# Patient Record
Sex: FEMALE | Race: WHITE | Employment: OTHER | URBAN - METROPOLITAN AREA
[De-identification: names, ages, dates, MRNs, and addresses within clinical notes are randomized per-mention and may not be internally consistent; named-entity substitution may affect disease eponyms.]

---

## 2018-02-05 ENCOUNTER — HOSPITAL ENCOUNTER (OUTPATIENT)
Dept: MAMMOGRAPHY | Age: 68
Discharge: HOME | End: 2018-02-05
Attending: INTERNAL MEDICINE
Payer: MEDICARE

## 2018-02-05 DIAGNOSIS — Z12.31 VISIT FOR SCREENING MAMMOGRAM: ICD-10-CM

## 2018-02-05 PROCEDURE — 77063 BREAST TOMOSYNTHESIS BI: CPT

## 2019-04-25 ENCOUNTER — HOSPITAL ENCOUNTER (OUTPATIENT)
Dept: MAMMOGRAPHY | Age: 69
Discharge: HOME | End: 2019-04-25
Attending: INTERNAL MEDICINE
Payer: MEDICARE

## 2019-04-25 DIAGNOSIS — Z12.31 VISIT FOR SCREENING MAMMOGRAM: ICD-10-CM

## 2019-04-25 PROCEDURE — 77063 BREAST TOMOSYNTHESIS BI: CPT

## 2019-07-11 ENCOUNTER — HOSPITAL ENCOUNTER (OUTPATIENT)
Dept: GENERAL RADIOLOGY | Age: 69
Discharge: HOME | End: 2019-07-11
Payer: MEDICARE

## 2019-07-11 DIAGNOSIS — M54.2 NECK PAIN: ICD-10-CM

## 2019-07-11 PROCEDURE — 72070 X-RAY EXAM THORAC SPINE 2VWS: CPT

## 2019-07-11 PROCEDURE — 72040 X-RAY EXAM NECK SPINE 2-3 VW: CPT

## 2020-09-17 ENCOUNTER — HOSPITAL ENCOUNTER (OUTPATIENT)
Dept: MAMMOGRAPHY | Age: 70
Discharge: HOME | End: 2020-09-17
Attending: INTERNAL MEDICINE
Payer: MEDICARE

## 2020-09-17 DIAGNOSIS — Z12.31 VISIT FOR SCREENING MAMMOGRAM: ICD-10-CM

## 2020-09-17 PROCEDURE — 77067 SCR MAMMO BI INCL CAD: CPT

## 2021-10-11 ENCOUNTER — TRANSCRIBE ORDER (OUTPATIENT)
Dept: SCHEDULING | Age: 71
End: 2021-10-11

## 2021-10-11 DIAGNOSIS — Z12.31 VISIT FOR SCREENING MAMMOGRAM: Primary | ICD-10-CM

## 2021-12-21 ENCOUNTER — HOSPITAL ENCOUNTER (OUTPATIENT)
Dept: MAMMOGRAPHY | Age: 71
Discharge: HOME | End: 2021-12-21
Attending: INTERNAL MEDICINE
Payer: MEDICARE

## 2021-12-21 DIAGNOSIS — Z12.31 VISIT FOR SCREENING MAMMOGRAM: ICD-10-CM

## 2021-12-21 PROCEDURE — 77063 BREAST TOMOSYNTHESIS BI: CPT

## 2022-12-05 ENCOUNTER — TRANSCRIBE ORDER (OUTPATIENT)
Dept: SCHEDULING | Age: 72
End: 2022-12-05

## 2022-12-05 DIAGNOSIS — Z12.31 VISIT FOR SCREENING MAMMOGRAM: Primary | ICD-10-CM

## 2023-01-03 ENCOUNTER — HOSPITAL ENCOUNTER (OUTPATIENT)
Dept: MAMMOGRAPHY | Age: 73
Discharge: HOME | End: 2023-01-03
Attending: INTERNAL MEDICINE
Payer: MEDICARE

## 2023-01-03 DIAGNOSIS — Z12.31 VISIT FOR SCREENING MAMMOGRAM: ICD-10-CM

## 2023-01-03 PROCEDURE — 77063 BREAST TOMOSYNTHESIS BI: CPT

## 2023-01-10 ENCOUNTER — OFFICE VISIT (OUTPATIENT)
Dept: CARDIOLOGY CLINIC | Age: 73
End: 2023-01-10
Payer: MEDICARE

## 2023-01-10 VITALS
OXYGEN SATURATION: 98 % | HEART RATE: 64 BPM | WEIGHT: 203 LBS | SYSTOLIC BLOOD PRESSURE: 112 MMHG | DIASTOLIC BLOOD PRESSURE: 72 MMHG

## 2023-01-10 DIAGNOSIS — R07.9 CHEST PAIN, UNSPECIFIED TYPE: ICD-10-CM

## 2023-01-10 DIAGNOSIS — R06.09 DOE (DYSPNEA ON EXERTION): Primary | ICD-10-CM

## 2023-01-10 RX ORDER — ASPIRIN 81 MG/1
81 TABLET ORAL DAILY
Qty: 90 TABLET | Refills: 3 | Status: SHIPPED | OUTPATIENT
Start: 2023-01-10

## 2023-01-10 RX ORDER — METFORMIN HYDROCHLORIDE 500 MG/1
500 TABLET ORAL 2 TIMES DAILY WITH MEALS
COMMUNITY

## 2023-01-10 RX ORDER — EPINEPHRINE 0.22MG
100 AEROSOL WITH ADAPTER (ML) INHALATION DAILY
COMMUNITY

## 2023-01-10 RX ORDER — NITROGLYCERIN 0.4 MG/1
0.4 TABLET SUBLINGUAL
Qty: 25 TABLET | Refills: 5 | Status: SHIPPED | OUTPATIENT
Start: 2023-01-10

## 2023-01-10 RX ORDER — OMEPRAZOLE 40 MG/1
40 CAPSULE, DELAYED RELEASE ORAL DAILY
COMMUNITY

## 2023-01-10 RX ORDER — LANOLIN ALCOHOL/MO/W.PET/CERES
400 CREAM (GRAM) TOPICAL DAILY
COMMUNITY

## 2023-01-10 NOTE — PROGRESS NOTES
Identified pt with two pt identifiers(name and ). Reviewed record in preparation for visit and have obtained necessary documentation. Mekhi Barrett presents today for   Chief Complaint   Patient presents with    New Patient     Per Dr. Zeyad Westbrook for chest pain and dyspnea        Pt c/o DESIR. Mekhi Barrett preferred language for health care discussion is english/other. Personal Protective Equipment:   Personal Protective Equipment was used including: mask-surgical and hands-gloves. Patient was placed on no precaution(s). Patient was masked. Precautions:   Patient currently on None  Patient currently roomed with door closed. Is someone accompanying this pt? yes    Is the patient using any DME equipment during OV? no    Depression Screening:  No flowsheet data found. Learning Assessment:  No flowsheet data found. Abuse Screening:  Abuse Screening Questionnaire 1/10/2023   Do you ever feel afraid of your partner? N   Are you in a relationship with someone who physically or mentally threatens you? N   Is it safe for you to go home? Y       Fall Risk  No flowsheet data found. Pt currently taking Anticoagulant therapy? no  Pt currently taking Antiplatelet therapy? no    Coordination of Care:  1. Have you been to the ER, urgent care clinic since your last visit? Hospitalized since your last visit? no    2. Have you seen or consulted any other health care providers outside of the [address] [place] [address] since your last visit? Include any pap smears or colon screening. yes      Please see Red banners under Allergies and Med Rec to remove outside inquires. All correct information has been verified with patient and added to chart.      Medication's patient's would liked removed has been marked not taking to be removed per Verbal order and read back per Ivania Waller MD

## 2023-01-10 NOTE — PROGRESS NOTES
[facility]    Ms. Poli Hernandez is 75-year-old female with a history of diabetes, GERD, hyperlipidemia    Patient is here today for cardiac evaluation  She denies prior history of MI CHF  Patient has been having some dyspnea on exertion with day-to-day activity over the last 3 months. About a month ago she had episode of chest pressure lasting for about less than 5 minutes. She was sitting and started having chest pressure with some left arm radiation. There was no nausea vomiting or diaphoresis. She was slightly short of breath along with that chest pressure. She never had this kind of chest pressure before. Since then she has no more chest pressure but she does have dyspnea with exertional activities. No PND or lower extremity swelling. No palpitation, presyncope or syncope  Denies any nausea, vomiting, abdominal pain, fever, chills, sputum production. No hematuria or other bleeding complaints    Past Medical History:   Diagnosis Date    Diabetes ([facility] [address]     GERD (gastroesophageal reflux disease)     HLD (hyperlipidemia)        Review of Systems:  Cardiac symptoms as noted above in HPI. All others negative. Denies fatigue, malaise, skin rash, joint pain, blurring vision, photophobia, neck pain, hemoptysis, chronic cough, nausea, vomiting, hematuria, burning micturition, BRBPR, chronic headaches. Current Outpatient Medications   Medication Sig    metFORMIN (GLUCOPHAGE) 500 mg tablet Take 500 mg by mouth two (2) times daily (with meals). co-enzyme Q-10 (Co Q-10) 100 mg capsule Take 100 mg by mouth daily. magnesium oxide (MAG-OX) 400 mg tablet Take 400 mg by mouth daily. omeprazole (PRILOSEC) 40 mg capsule Take 40 mg by mouth daily. CRESTOR 10 mg tablet     ARMOUR THYROID 60 mg tablet     escitalopram oxalate (LEXAPRO) 5 mg tablet Take 5 mg by mouth daily.     vitamins a & d cap      No current facility-administered medications for this visit. No past surgical history on file. Allergies and Sensitivities:  No Known Allergies    Family History:  Family History   Problem Relation Age of Onset    Cancer Sister     Cancer Brother        Social History:  Social History     Tobacco Use    Smoking status: Former     Types: Cigarettes   Substance Use Topics    Alcohol use: Yes     Alcohol/week: 0.0 standard drinks     Comment: socially    Drug use: No     She  reports that she has quit smoking. Her smoking use included cigarettes. She does not have any smokeless tobacco history on file. She  reports current alcohol use. Physical Exam:  BP Readings from Last 3 Encounters:   01/10/23 112/72   09/22/15 131/72         Pulse Readings from Last 3 Encounters:   01/10/23 64   09/22/15 76          Wt Readings from Last 3 Encounters:   01/10/23 92.1 kg (203 lb)   09/22/15 90.7 kg (200 lb)       Constitutional: Oriented to person, place, and time. HENT: Head: Normocephalic and atraumatic. Eyes: Conjunctivae and extraocular motions are normal.   Neck: No JVD present. Carotid bruit is not appreciated. Cardiovascular: Regular rhythm. No murmur, gallop or rubs appreciated  Lung: Breath sounds normal. No respiratory distress. No ronchi or rales appreciated  Abdominal: No tenderness. No rebound and no guarding. Musculoskeletal: There is no lower extremity edema. No cynosis  Lymphadenopathy:  No cervical or supraclavicular adenopathy appriciated. Neurological: No gross motor deficit noted. Skin: No visible skin rash noted. No Ear discharge noted  Psychiatric: Normal mood and affect.      LABS:   @  Lab Results   Component Value Date/Time    WBC 4.4 (L) 07/15/2010 08:59 AM    HGB 13.7 07/15/2010 08:59 AM    HCT 40.8 07/15/2010 08:59 AM    PLATELET 315 44/98/5615 08:59 AM    MCV 88.0 07/15/2010 08:59 AM     Lab Results   Component Value Date/Time    Sodium 136 10/19/2010 10:50 AM    Potassium 4.9 10/19/2010 10:50 AM    Chloride 100 10/19/2010 10:50 AM    CO2 28 10/19/2010 10:50 AM    Glucose 119 (H) 10/19/2010 10:50 AM    BUN 9 10/19/2010 10:50 AM    Creatinine 0.9 10/19/2010 10:50 AM     No flowsheet data found. Lab Results   Component Value Date/Time    ALT (SGPT) 62 10/19/2010 10:50 AM     Lab Results   Component Value Date/Time    Hemoglobin A1c 6.2 (H) 10/19/2010 10:52 AM     Lab Results   Component Value Date/Time    TSH 0.12 (L) 10/19/2010 10:50 AM     EK/10/2023: Sinus rhythm at 84 bpm.  Normal TN and QRS interval.  No ST changes of ischemia. STRESS TEST (EST, PHARM, NUC, ECHO etc)    CATHETERIZATION    IMPRESSION & PLAN:  Ms. Macey Shultz is 54-year-old female    Dyspnea and chest discomfort:  Exertional dyspnea over the last 3 months. Episode of chest discomfort concerning for anginal episode about a month ago  Start aspirin 81 mg daily  Will provide sublingual nitroglycerin for as needed use  Will need a nuclear stress test for strict stratification  Echo to rule out any pulmonary hypertension or abnormal cardiac structure  Have advised patient to avoid any exertional activity. Call 911 if no improvement of chest pain with nitroglycerin    Hyperlipidemia: Currently on Crestor 5 mg daily. This is being checked by PCP regularly. Goal LDL should be less than 70 considering history of diabetes    Diabetes: Goal hemoglobin A1c less than 7 is recommended from cardiovascular standpoint. Defer to PCP  Importance of diet and exercise was discussed with patient. This plan was discussed with patient who is in agreement. Thank you for allowing me to participate in patient care. Please feel free to call me if you have any question or concern. Levon Castro MD  Please note: This document has been produced using voice recognition software. Unrecognized errors in transcription may be present.

## 2023-01-10 NOTE — PATIENT INSTRUCTIONS
Testing   Echo  Nuclear Stress-Nuclear Stress Instructions-Nothing to eat or drink past midnight and no medicaitons the morning of cardiac testing. **call office 3-5 days after testing is completed for results**     New Medication/Medication Changes  Nitro 0.4 mg tab as needed for Chest pain  Aspirin 81 mg tab daily     **please allow 24-48 hrs for medication to be escribed to pharmacy** If you need any refills on medications please contact your pharmacy so that the request can be escribed to the provider for review seven to 10 days prior to being out of medication.

## 2023-01-10 NOTE — LETTER
1/10/2023    Patient: Cassia Marcano   YOB: 1950   Date of Visit: 1/10/2023     Tez Hutchison MD  [phone]  Via Fax: [phone]    Dear Tez Hutchison MD,      Thank you for referring Ms. Sonali Limon to [address] [place] [address] for evaluation. My notes for this consultation are attached. If you have questions, please do not hesitate to call me. I look forward to following your patient along with you.       Sincerely,    Erik Webb MD

## 2023-01-16 ENCOUNTER — TELEPHONE (OUTPATIENT)
Dept: CARDIOLOGY CLINIC | Age: 73
End: 2023-01-16

## 2023-01-18 ENCOUNTER — TELEPHONE (OUTPATIENT)
Dept: CARDIOLOGY CLINIC | Age: 73
End: 2023-01-18

## 2023-01-18 NOTE — TELEPHONE ENCOUNTER
----- Message from Sajan Borges MD sent at 1/17/2023  2:53 PM EST -----  Please inform patient about test result  Appears normal.    Thanks  SP

## 2023-01-18 NOTE — TELEPHONE ENCOUNTER
Contacted pt at cell #. Two patient Identifiers confirmed. Advised pt per  about normal stress test results.

## 2024-03-06 ENCOUNTER — HOSPITAL ENCOUNTER (OUTPATIENT)
Facility: HOSPITAL | Age: 74
Discharge: HOME | End: 2024-03-09
Attending: INTERNAL MEDICINE
Payer: MEDICARE

## 2024-03-06 DIAGNOSIS — Z12.31 BREAST CANCER SCREENING BY MAMMOGRAM: ICD-10-CM

## 2024-03-06 PROCEDURE — 77063 BREAST TOMOSYNTHESIS BI: CPT

## 2024-05-28 ENCOUNTER — APPOINTMENT (OUTPATIENT)
Facility: HOSPITAL | Age: 74
End: 2024-05-28
Attending: EMERGENCY MEDICINE
Payer: MEDICARE

## 2024-05-28 ENCOUNTER — APPOINTMENT (OUTPATIENT)
Facility: HOSPITAL | Age: 74
End: 2024-05-28
Payer: MEDICARE

## 2024-05-28 ENCOUNTER — HOSPITAL ENCOUNTER (EMERGENCY)
Facility: HOSPITAL | Age: 74
Discharge: HOME | End: 2024-05-28
Attending: EMERGENCY MEDICINE
Payer: MEDICARE

## 2024-05-28 VITALS
BODY MASS INDEX: 35.16 KG/M2 | DIASTOLIC BLOOD PRESSURE: 70 MMHG | HEART RATE: 73 BPM | HEIGHT: 65 IN | TEMPERATURE: 97.9 F | SYSTOLIC BLOOD PRESSURE: 153 MMHG | WEIGHT: 211 LBS | RESPIRATION RATE: 21 BRPM | OXYGEN SATURATION: 96 %

## 2024-05-28 DIAGNOSIS — R91.8 LUNG MASS: Primary | ICD-10-CM

## 2024-05-28 LAB
ALBUMIN SERPL-MCNC: 3.6 G/DL (ref 3.4–5)
ALBUMIN/GLOB SERPL: 0.9 (ref 0.8–1.7)
ALP SERPL-CCNC: 85 U/L (ref 45–117)
ALT SERPL-CCNC: 29 U/L (ref 13–56)
ANION GAP SERPL CALC-SCNC: 3 MMOL/L (ref 3–18)
AST SERPL-CCNC: 20 U/L (ref 10–38)
BASOPHILS # BLD: 0 K/UL (ref 0–0.1)
BASOPHILS NFR BLD: 1 % (ref 0–2)
BILIRUB SERPL-MCNC: 0.3 MG/DL (ref 0.2–1)
BUN SERPL-MCNC: 11 MG/DL (ref 7–18)
BUN/CREAT SERPL: 15 (ref 12–20)
CALCIUM SERPL-MCNC: 9.5 MG/DL (ref 8.5–10.1)
CHLORIDE SERPL-SCNC: 104 MMOL/L (ref 100–111)
CO2 SERPL-SCNC: 29 MMOL/L (ref 21–32)
CREAT SERPL-MCNC: 0.75 MG/DL (ref 0.6–1.3)
DIFFERENTIAL METHOD BLD: ABNORMAL
EKG ATRIAL RATE: 57 BPM
EKG DIAGNOSIS: NORMAL
EKG P AXIS: 55 DEGREES
EKG P-R INTERVAL: 152 MS
EKG Q-T INTERVAL: 414 MS
EKG QRS DURATION: 74 MS
EKG QTC CALCULATION (BAZETT): 402 MS
EKG R AXIS: 37 DEGREES
EKG T AXIS: 73 DEGREES
EKG VENTRICULAR RATE: 57 BPM
EOSINOPHIL # BLD: 0.2 K/UL (ref 0–0.4)
EOSINOPHIL NFR BLD: 3 % (ref 0–5)
ERYTHROCYTE [DISTWIDTH] IN BLOOD BY AUTOMATED COUNT: 13.2 % (ref 11.6–14.5)
GLOBULIN SER CALC-MCNC: 3.8 G/DL (ref 2–4)
GLUCOSE SERPL-MCNC: 110 MG/DL (ref 74–99)
HCT VFR BLD AUTO: 39.1 % (ref 35–45)
HGB BLD-MCNC: 12.4 G/DL (ref 12–16)
IMM GRANULOCYTES # BLD AUTO: 0 K/UL (ref 0–0.04)
IMM GRANULOCYTES NFR BLD AUTO: 0 % (ref 0–0.5)
LYMPHOCYTES # BLD: 1.8 K/UL (ref 0.9–3.6)
LYMPHOCYTES NFR BLD: 27 % (ref 21–52)
MCH RBC QN AUTO: 26.4 PG (ref 24–34)
MCHC RBC AUTO-ENTMCNC: 31.7 G/DL (ref 31–37)
MCV RBC AUTO: 83.4 FL (ref 78–100)
MONOCYTES # BLD: 0.5 K/UL (ref 0.05–1.2)
MONOCYTES NFR BLD: 8 % (ref 3–10)
NEUTS SEG # BLD: 4.1 K/UL (ref 1.8–8)
NEUTS SEG NFR BLD: 61 % (ref 40–73)
NRBC # BLD: 0 K/UL (ref 0–0.01)
NRBC BLD-RTO: 0 PER 100 WBC
PLATELET # BLD AUTO: 321 K/UL (ref 135–420)
PMV BLD AUTO: 9.1 FL (ref 9.2–11.8)
POTASSIUM SERPL-SCNC: 4.6 MMOL/L (ref 3.5–5.5)
PROT SERPL-MCNC: 7.4 G/DL (ref 6.4–8.2)
RBC # BLD AUTO: 4.69 M/UL (ref 4.2–5.3)
SODIUM SERPL-SCNC: 136 MMOL/L (ref 136–145)
TROPONIN I SERPL HS-MCNC: 6 NG/L (ref 0–54)
WBC # BLD AUTO: 6.7 K/UL (ref 4.6–13.2)

## 2024-05-28 PROCEDURE — 71275 CT ANGIOGRAPHY CHEST: CPT

## 2024-05-28 PROCEDURE — 84484 ASSAY OF TROPONIN QUANT: CPT

## 2024-05-28 PROCEDURE — 80053 COMPREHEN METABOLIC PANEL: CPT

## 2024-05-28 PROCEDURE — 99285 EMERGENCY DEPT VISIT HI MDM: CPT

## 2024-05-28 PROCEDURE — 93971 EXTREMITY STUDY: CPT

## 2024-05-28 PROCEDURE — 85025 COMPLETE CBC W/AUTO DIFF WBC: CPT

## 2024-05-28 PROCEDURE — 6360000004 HC RX CONTRAST MEDICATION: Performed by: EMERGENCY MEDICINE

## 2024-05-28 PROCEDURE — 93005 ELECTROCARDIOGRAM TRACING: CPT | Performed by: EMERGENCY MEDICINE

## 2024-05-28 PROCEDURE — 93010 ELECTROCARDIOGRAM REPORT: CPT | Performed by: INTERNAL MEDICINE

## 2024-05-28 RX ORDER — OMEPRAZOLE 40 MG/1
40 CAPSULE, DELAYED RELEASE ORAL DAILY
COMMUNITY

## 2024-05-28 RX ORDER — LEVOTHYROXINE SODIUM 0.05 MG/1
50 TABLET ORAL DAILY
COMMUNITY

## 2024-05-28 RX ORDER — METOPROLOL SUCCINATE 25 MG/1
25 TABLET, EXTENDED RELEASE ORAL DAILY
COMMUNITY

## 2024-05-28 RX ORDER — CELECOXIB 200 MG/1
1 CAPSULE ORAL
COMMUNITY
Start: 2023-06-06

## 2024-05-28 RX ORDER — LIOTHYRONINE SODIUM 25 UG/1
25 TABLET ORAL DAILY
COMMUNITY

## 2024-05-28 RX ORDER — FLUTICASONE PROPIONATE 50 UG/1
1-2 SPRAY, METERED NASAL
COMMUNITY

## 2024-05-28 RX ORDER — METFORMIN HYDROCHLORIDE 500 MG/1
TABLET, EXTENDED RELEASE ORAL
COMMUNITY
Start: 2024-03-04

## 2024-05-28 RX ORDER — TIZANIDINE 4 MG/1
4 TABLET ORAL EVERY 6 HOURS PRN
COMMUNITY

## 2024-05-28 RX ADMIN — IOPAMIDOL 80 ML: 755 INJECTION, SOLUTION INTRAVENOUS at 14:02

## 2024-05-28 ASSESSMENT — PAIN - FUNCTIONAL ASSESSMENT: PAIN_FUNCTIONAL_ASSESSMENT: NONE - DENIES PAIN

## 2024-05-28 NOTE — ED PROVIDER NOTES
EMERGENCY DEPARTMENT HISTORY AND PHYSICAL EXAM    12:28 PM EDT seen at this time in room QC        Date: 5/28/2024  Patient Name: Felicity Oquendo    History of Presenting Illness     Chief Complaint   Patient presents with    Shortness of Breath         History Provided By: patient    Additional History (Context): Felicity Oquendo is a 73 y.o. female presents with history of hypertension diabetes, recently had a long road trip involving [place] and [place] and then back home to [place], had an episode around 8 May of a left calf cramp that was momentary but then noted shortness of breath with any exertion and this is not normal saw a hospital in [place] and was treated with albuterol and prednisone which really cleared up her symptoms pretty well.  While in [place] later on she had an exertional walk that was very hot and noted additional shortness of breath.  Her  says her voice and her breathing is not as normal pattern though she is not endorsing shortness of breath with activity anymore she has had no chest pain.  No history of venous thromboembolic disease.    PCP: Danilo Willams MD    Chief Complaint:   Duration:    Timing:    Location:   Quality:   Severity:   Modifying Factors:   Associated Symptoms:       No current facility-administered medications for this encounter.     Current Outpatient Medications   Medication Sig Dispense Refill    metFORMIN (GLUCOPHAGE-XR) 500 MG extended release tablet       celecoxib (CELEBREX) 200 MG capsule Take 1 capsule by mouth      tiZANidine (ZANAFLEX) 4 MG tablet Take 1 tablet by mouth every 6 hours as needed      metoprolol succinate (TOPROL XL) 25 MG extended release tablet Take 1 tablet by mouth daily      SITagliptin (JANUVIA) 100 MG tablet Take 1 tablet by mouth daily      levothyroxine (SYNTHROID) 50 MCG tablet Take 1 tablet by mouth Daily      liothyronine (CYTOMEL) 25 MCG tablet Take 1 tablet by mouth daily      FLONASE ALLERGY RELIEF 50 MCG/ACT nasal

## 2024-05-31 LAB — ECHO BSA: 2.09 M2

## 2024-06-12 ENCOUNTER — OFFICE VISIT (OUTPATIENT)
Age: 74
End: 2024-06-12
Payer: MEDICARE

## 2024-06-12 VITALS
OXYGEN SATURATION: 96 % | DIASTOLIC BLOOD PRESSURE: 70 MMHG | RESPIRATION RATE: 18 BRPM | HEART RATE: 98 BPM | WEIGHT: 213.6 LBS | SYSTOLIC BLOOD PRESSURE: 154 MMHG | BODY MASS INDEX: 35.54 KG/M2 | TEMPERATURE: 97.9 F

## 2024-06-12 DIAGNOSIS — R91.1 LUNG NODULE: Primary | ICD-10-CM

## 2024-06-12 DIAGNOSIS — Z87.891 FORMER SMOKER: ICD-10-CM

## 2024-06-12 DIAGNOSIS — R59.0 MEDIASTINAL ADENOPATHY: ICD-10-CM

## 2024-06-12 DIAGNOSIS — R06.02 SOB (SHORTNESS OF BREATH): ICD-10-CM

## 2024-06-12 PROCEDURE — G8427 DOCREV CUR MEDS BY ELIG CLIN: HCPCS | Performed by: INTERNAL MEDICINE

## 2024-06-12 PROCEDURE — 99204 OFFICE O/P NEW MOD 45 MIN: CPT | Performed by: INTERNAL MEDICINE

## 2024-06-12 PROCEDURE — 1036F TOBACCO NON-USER: CPT | Performed by: INTERNAL MEDICINE

## 2024-06-12 PROCEDURE — 1090F PRES/ABSN URINE INCON ASSESS: CPT | Performed by: INTERNAL MEDICINE

## 2024-06-12 PROCEDURE — G8400 PT W/DXA NO RESULTS DOC: HCPCS | Performed by: INTERNAL MEDICINE

## 2024-06-12 PROCEDURE — 3017F COLORECTAL CA SCREEN DOC REV: CPT | Performed by: INTERNAL MEDICINE

## 2024-06-12 PROCEDURE — 1123F ACP DISCUSS/DSCN MKR DOCD: CPT | Performed by: INTERNAL MEDICINE

## 2024-06-12 PROCEDURE — G8419 CALC BMI OUT NRM PARAM NOF/U: HCPCS | Performed by: INTERNAL MEDICINE

## 2024-06-12 RX ORDER — ALBUTEROL SULFATE 90 UG/1
2 AEROSOL, METERED RESPIRATORY (INHALATION) EVERY 6 HOURS PRN
Qty: 18 G | Refills: 3 | Status: SHIPPED | OUTPATIENT
Start: 2024-06-12

## 2024-06-12 RX ORDER — ASPIRIN 81 MG/1
81 TABLET ORAL DAILY
COMMUNITY
Start: 2023-01-10

## 2024-06-12 ASSESSMENT — ENCOUNTER SYMPTOMS
PHOTOPHOBIA: 0
VOICE CHANGE: 0
RHINORRHEA: 0
CONSTIPATION: 0
COLOR CHANGE: 0
EYE DISCHARGE: 0
SINUS PRESSURE: 0
WHEEZING: 1
EYE ITCHING: 0
COUGH: 1
CHOKING: 0
SORE THROAT: 0
TROUBLE SWALLOWING: 0
NAUSEA: 0
BACK PAIN: 0
VOMITING: 0
CHEST TIGHTNESS: 0
EYE REDNESS: 0
DIARRHEA: 0
BLOOD IN STOOL: 0
SHORTNESS OF BREATH: 1
ABDOMINAL PAIN: 0
STRIDOR: 0
APNEA: 0
EYE PAIN: 0

## 2024-06-12 NOTE — PROGRESS NOTES
Felicity Oquendo presents today for   Chief Complaint   Patient presents with    New Patient     Referred by [facility] [place] [facility] ED for Lung Mass (CT done 24)       Is someone accompanying this pt? Family    Is the patient using any DME equipment during OV? No    -DME Company NA    Depression Screenin/10/2023    11:58 AM   PHQ-9 Questionaire   Little interest or pleasure in doing things 0   Feeling down, depressed, or hopeless 0   PHQ-9 Total Score 0       Learning Needs Questionnaire:     Who is the primary learner? Patient    What is the preferred language for health care of the primary learner? ENGLISH    How does the primary learner prefer to learn new concepts? DEMONSTRATION    Answered By Patient    Relationship to Learner SELF          Fall Risk:          No data to display                 Abuse Screening:          No data to display                  Coordination of Care:    1. Have you been to the ER, urgent care clinic since your last visit? Hospitalized since your last visit? 24 [facility] [place] [facility] ED: Lung Mass    2. Have you seen or consulted any other health care providers outside of the [facility] since your last visit? Include any pap smears or colon screening. PCP-Dr.Parimal Willams    Medication list has been update per patient.        
   Diabetes (HCC)     GERD (gastroesophageal reflux disease)     HLD (hyperlipidemia)     HTN (hypertension)     Hypothyroidism     Vertigo      No past surgical history on file.  Current Outpatient Medications on File Prior to Visit   Medication Sig Dispense Refill    metFORMIN (GLUCOPHAGE-XR) 500 MG extended release tablet       FLONASE ALLERGY RELIEF 50 MCG/ACT nasal spray 1-2 sprays by Nasal route      omeprazole (PRILOSEC) 40 MG delayed release capsule Take 1 capsule by mouth daily      celecoxib (CELEBREX) 200 MG capsule Take 1 capsule by mouth      metoprolol succinate (TOPROL XL) 25 MG extended release tablet Take 1 tablet by mouth daily      SITagliptin (JANUVIA) 100 MG tablet Take 1 tablet by mouth daily      levothyroxine (SYNTHROID) 50 MCG tablet Take 1 tablet by mouth Daily      liothyronine (CYTOMEL) 25 MCG tablet Take 1 tablet by mouth daily      escitalopram (LEXAPRO) 10 MG tablet ceived the following from [facility]: Outside name: escitalopram oxalate (LEXAPRO) 10 mg tablet      rosuvastatin (CRESTOR) 10 MG tablet ceived the following from [facility]: Outside name: CRESTOR 10 mg tablet      aspirin 81 MG EC tablet Take 1 tablet by mouth daily (Patient not taking: Reported on 6/12/2024)      tiZANidine (ZANAFLEX) 4 MG tablet Take 1 tablet by mouth every 6 hours as needed (Patient not taking: Reported on 6/12/2024)      lisinopril (PRINIVIL;ZESTRIL) 10 MG tablet ceived the following from [facility]: Outside name: lisinopril (PRINIVIL, ZESTRIL) 10 mg tablet      thyroid (ARMOUR THYROID) 60 MG tablet ceived the following from [facility]: Outside name: ARMOUR THYROID 60 mg tablet       No current facility-administered medications on file prior to visit.     Allergies   Allergen Reactions    Atorvastatin      Other Reaction(s): myalgia    Simvastatin Myalgia    Statins      Unknown     Family History   Problem Relation Age of Onset

## 2024-06-17 ENCOUNTER — HOSPITAL ENCOUNTER (OUTPATIENT)
Facility: HOSPITAL | Age: 74
Discharge: HOME | End: 2024-06-20
Payer: MEDICARE

## 2024-06-17 PROCEDURE — 94729 DIFFUSING CAPACITY: CPT

## 2024-06-17 PROCEDURE — 94060 EVALUATION OF WHEEZING: CPT

## 2024-06-17 PROCEDURE — 94726 PLETHYSMOGRAPHY LUNG VOLUMES: CPT

## 2024-06-20 ENCOUNTER — HOSPITAL ENCOUNTER (OUTPATIENT)
Facility: HOSPITAL | Age: 74
Discharge: HOME | End: 2024-06-20
Attending: INTERNAL MEDICINE
Payer: MEDICARE

## 2024-06-20 DIAGNOSIS — R91.1 LUNG NODULE: ICD-10-CM

## 2024-06-20 PROCEDURE — A9609 HC RX DIAGNOSTIC RADIOPHARMACEUTICAL: HCPCS | Performed by: INTERNAL MEDICINE

## 2024-06-20 PROCEDURE — 78815 PET IMAGE W/CT SKULL-THIGH: CPT

## 2024-06-20 PROCEDURE — 3430000000 HC RX DIAGNOSTIC RADIOPHARMACEUTICAL: Performed by: INTERNAL MEDICINE

## 2024-06-20 RX ORDER — FLUDEOXYGLUCOSE F-18 500 MCI/ML
9.76 INJECTION INTRAVENOUS
Status: COMPLETED | OUTPATIENT
Start: 2024-06-20 | End: 2024-06-20

## 2024-06-20 RX ADMIN — FLUDEOXYGLUCOSE F-18 9.76 MILLICURIE: 500 INJECTION INTRAVENOUS at 10:35

## 2024-06-21 ENCOUNTER — TELEPHONE (OUTPATIENT)
Facility: HOSPITAL | Age: 74
End: 2024-06-21

## 2024-06-24 ENCOUNTER — APPOINTMENT (OUTPATIENT)
Facility: HOSPITAL | Age: 74
End: 2024-06-24
Attending: RADIOLOGY
Payer: MEDICARE

## 2024-06-24 ENCOUNTER — HOSPITAL ENCOUNTER (OUTPATIENT)
Facility: HOSPITAL | Age: 74
Discharge: HOME | End: 2024-06-24
Attending: RADIOLOGY | Admitting: RADIOLOGY
Payer: MEDICARE

## 2024-06-24 VITALS
BODY MASS INDEX: 35.71 KG/M2 | OXYGEN SATURATION: 96 % | DIASTOLIC BLOOD PRESSURE: 73 MMHG | RESPIRATION RATE: 18 BRPM | TEMPERATURE: 97.8 F | HEART RATE: 75 BPM | WEIGHT: 214.6 LBS | SYSTOLIC BLOOD PRESSURE: 171 MMHG

## 2024-06-24 DIAGNOSIS — R91.1 LUNG NODULE: ICD-10-CM

## 2024-06-24 LAB
ANION GAP SERPL CALC-SCNC: 4 MMOL/L (ref 3–18)
APTT PPP: 20.9 SEC (ref 23–36.4)
BASOPHILS # BLD: 0 K/UL (ref 0–0.1)
BASOPHILS NFR BLD: 1 % (ref 0–2)
BUN SERPL-MCNC: 13 MG/DL (ref 7–18)
BUN/CREAT SERPL: 16 (ref 12–20)
CALCIUM SERPL-MCNC: 9.1 MG/DL (ref 8.5–10.1)
CHLORIDE SERPL-SCNC: 105 MMOL/L (ref 100–111)
CO2 SERPL-SCNC: 28 MMOL/L (ref 21–32)
CREAT SERPL-MCNC: 0.79 MG/DL (ref 0.6–1.3)
DIFFERENTIAL METHOD BLD: ABNORMAL
EOSINOPHIL # BLD: 0.2 K/UL (ref 0–0.4)
EOSINOPHIL NFR BLD: 4 % (ref 0–5)
ERYTHROCYTE [DISTWIDTH] IN BLOOD BY AUTOMATED COUNT: 13.6 % (ref 11.6–14.5)
GLUCOSE BLD STRIP.AUTO-MCNC: 175 MG/DL (ref 70–110)
GLUCOSE SERPL-MCNC: 140 MG/DL (ref 74–99)
HCT VFR BLD AUTO: 37.9 % (ref 35–45)
HGB BLD-MCNC: 11.8 G/DL (ref 12–16)
IMM GRANULOCYTES # BLD AUTO: 0 K/UL (ref 0–0.04)
IMM GRANULOCYTES NFR BLD AUTO: 0 % (ref 0–0.5)
INR PPP: 0.9 (ref 0.9–1.1)
LYMPHOCYTES # BLD: 1.5 K/UL (ref 0.9–3.6)
LYMPHOCYTES NFR BLD: 26 % (ref 21–52)
MCH RBC QN AUTO: 26.4 PG (ref 24–34)
MCHC RBC AUTO-ENTMCNC: 31.1 G/DL (ref 31–37)
MCV RBC AUTO: 84.8 FL (ref 78–100)
MONOCYTES # BLD: 0.4 K/UL (ref 0.05–1.2)
MONOCYTES NFR BLD: 8 % (ref 3–10)
NEUTS SEG # BLD: 3.6 K/UL (ref 1.8–8)
NEUTS SEG NFR BLD: 62 % (ref 40–73)
NRBC # BLD: 0 K/UL (ref 0–0.01)
NRBC BLD-RTO: 0 PER 100 WBC
PLATELET # BLD AUTO: 285 K/UL (ref 135–420)
PMV BLD AUTO: 9 FL (ref 9.2–11.8)
POTASSIUM SERPL-SCNC: 4.7 MMOL/L (ref 3.5–5.5)
PROTHROMBIN TIME: 12.2 SEC (ref 11.9–14.9)
RBC # BLD AUTO: 4.47 M/UL (ref 4.2–5.3)
SODIUM SERPL-SCNC: 137 MMOL/L (ref 136–145)
WBC # BLD AUTO: 5.8 K/UL (ref 4.6–13.2)

## 2024-06-24 PROCEDURE — 71045 X-RAY EXAM CHEST 1 VIEW: CPT

## 2024-06-24 PROCEDURE — 7100000010 HC PHASE II RECOVERY - FIRST 15 MIN: Performed by: STUDENT IN AN ORGANIZED HEALTH CARE EDUCATION/TRAINING PROGRAM

## 2024-06-24 PROCEDURE — 6360000002 HC RX W HCPCS: Performed by: STUDENT IN AN ORGANIZED HEALTH CARE EDUCATION/TRAINING PROGRAM

## 2024-06-24 PROCEDURE — 88333 PATH CONSLTJ SURG CYTO XM 1: CPT

## 2024-06-24 PROCEDURE — 85025 COMPLETE CBC W/AUTO DIFF WBC: CPT

## 2024-06-24 PROCEDURE — 82962 GLUCOSE BLOOD TEST: CPT

## 2024-06-24 PROCEDURE — 85730 THROMBOPLASTIN TIME PARTIAL: CPT

## 2024-06-24 PROCEDURE — 6370000000 HC RX 637 (ALT 250 FOR IP): Performed by: STUDENT IN AN ORGANIZED HEALTH CARE EDUCATION/TRAINING PROGRAM

## 2024-06-24 PROCEDURE — 99153 MOD SED SAME PHYS/QHP EA: CPT

## 2024-06-24 PROCEDURE — 7100000011 HC PHASE II RECOVERY - ADDTL 15 MIN: Performed by: STUDENT IN AN ORGANIZED HEALTH CARE EDUCATION/TRAINING PROGRAM

## 2024-06-24 PROCEDURE — 80048 BASIC METABOLIC PNL TOTAL CA: CPT

## 2024-06-24 PROCEDURE — 88334 PATH CONSLTJ SURG CYTO XM EA: CPT

## 2024-06-24 PROCEDURE — 88342 IMHCHEM/IMCYTCHM 1ST ANTB: CPT

## 2024-06-24 PROCEDURE — 88341 IMHCHEM/IMCYTCHM EA ADD ANTB: CPT

## 2024-06-24 PROCEDURE — 88305 TISSUE EXAM BY PATHOLOGIST: CPT

## 2024-06-24 PROCEDURE — 85610 PROTHROMBIN TIME: CPT

## 2024-06-24 RX ORDER — FENTANYL CITRATE 50 UG/ML
INJECTION, SOLUTION INTRAMUSCULAR; INTRAVENOUS PRN
Status: COMPLETED | OUTPATIENT
Start: 2024-06-24 | End: 2024-06-24

## 2024-06-24 RX ORDER — HYDRALAZINE HYDROCHLORIDE 20 MG/ML
INJECTION INTRAMUSCULAR; INTRAVENOUS PRN
Status: COMPLETED | OUTPATIENT
Start: 2024-06-24 | End: 2024-06-24

## 2024-06-24 RX ORDER — ACETAMINOPHEN 500 MG
1000 TABLET ORAL ONCE
Status: COMPLETED | OUTPATIENT
Start: 2024-06-24 | End: 2024-06-24

## 2024-06-24 RX ORDER — HYDRALAZINE HYDROCHLORIDE 20 MG/ML
INJECTION INTRAMUSCULAR; INTRAVENOUS
Status: DISCONTINUED
Start: 2024-06-24 | End: 2024-06-24 | Stop reason: HOSPADM

## 2024-06-24 RX ORDER — FENTANYL CITRATE 50 UG/ML
INJECTION, SOLUTION INTRAMUSCULAR; INTRAVENOUS
Status: DISCONTINUED
Start: 2024-06-24 | End: 2024-06-24 | Stop reason: HOSPADM

## 2024-06-24 RX ORDER — MIDAZOLAM HYDROCHLORIDE 1 MG/ML
INJECTION INTRAMUSCULAR; INTRAVENOUS
Status: DISCONTINUED
Start: 2024-06-24 | End: 2024-06-24 | Stop reason: HOSPADM

## 2024-06-24 RX ORDER — MIDAZOLAM HYDROCHLORIDE 2 MG/2ML
INJECTION, SOLUTION INTRAMUSCULAR; INTRAVENOUS PRN
Status: COMPLETED | OUTPATIENT
Start: 2024-06-24 | End: 2024-06-24

## 2024-06-24 RX ORDER — SODIUM CHLORIDE 9 MG/ML
INJECTION, SOLUTION INTRAVENOUS CONTINUOUS
Status: DISCONTINUED | OUTPATIENT
Start: 2024-06-24 | End: 2024-06-25 | Stop reason: HOSPADM

## 2024-06-24 RX ADMIN — FENTANYL CITRATE 25 MCG: 50 INJECTION INTRAMUSCULAR; INTRAVENOUS at 10:50

## 2024-06-24 RX ADMIN — MIDAZOLAM HYDROCHLORIDE 1 MG: 1 INJECTION, SOLUTION INTRAMUSCULAR; INTRAVENOUS at 10:35

## 2024-06-24 RX ADMIN — FENTANYL CITRATE 50 MCG: 50 INJECTION INTRAMUSCULAR; INTRAVENOUS at 10:35

## 2024-06-24 RX ADMIN — HYDRALAZINE HYDROCHLORIDE 10 MG: 20 INJECTION, SOLUTION INTRAMUSCULAR; INTRAVENOUS at 10:25

## 2024-06-24 RX ADMIN — FENTANYL CITRATE 25 MCG: 50 INJECTION INTRAMUSCULAR; INTRAVENOUS at 10:40

## 2024-06-24 RX ADMIN — MIDAZOLAM HYDROCHLORIDE 0.5 MG: 1 INJECTION, SOLUTION INTRAMUSCULAR; INTRAVENOUS at 10:50

## 2024-06-24 RX ADMIN — ACETAMINOPHEN 1000 MG: 500 TABLET ORAL at 13:39

## 2024-06-24 RX ADMIN — MIDAZOLAM HYDROCHLORIDE 0.5 MG: 1 INJECTION, SOLUTION INTRAMUSCULAR; INTRAVENOUS at 10:40

## 2024-06-24 ASSESSMENT — PAIN SCALES - GENERAL
PAINLEVEL_OUTOF10: 0
PAINLEVEL_OUTOF10: 6
PAINLEVEL_OUTOF10: 0
PAINLEVEL_OUTOF10: 0

## 2024-06-24 ASSESSMENT — PAIN DESCRIPTION - LOCATION: LOCATION: HEAD

## 2024-06-24 ASSESSMENT — PAIN - FUNCTIONAL ASSESSMENT
PAIN_FUNCTIONAL_ASSESSMENT: ACTIVITIES ARE NOT PREVENTED
PAIN_FUNCTIONAL_ASSESSMENT: 0-10

## 2024-06-24 NOTE — DISCHARGE INSTRUCTIONS
LUNG BIOPSY DISCHARGE INSTRUCTIONS      You have just had a procedure called a Lung Biopsy    The following instructions are for your safety after leaving the hospital. If any questions concerning them arise, please call the number which appears at the top of the page.      You may resume your regular diet upon leaving the hospital.    You may have received intravenous medications which cause weakness and sedation. Therefore, DO NOT drive, operate any potentially dangerous machinery (lawnmowers, chainsaws, etc.) or conduct any important business for the rest of the day.      If you have redness or swelling at the site of the needle biopsy or where medications were given, place warm-wet washcloths over the affected area for 20 minutes at a time until the redness subsides. If the redness continues more than two or three days, call your physician.      If you develop shortness of breath or chest pain (particularly when taking a deep breath) notify your physician.    You may have some hemoptysis (blood spitting) for the next few days following the procedure. Do not be concerned unless this increases in quantity or is accompanied by shortness of breath or chest pain.     A mild fever may occur on the evening of the procedure. If you develop a temperature over 102.5°F or are having shaking chills, call your physician.     Avoid medications containing aspirin (Anacin, Bufferin, Inessa Bolivar, Percodan) for several days. You may use Acetaminophen (Tylenol, Datril, Sinutab, Vanquis, etc.).    Specific Instructions:           We provide this literature for patients and family members.  It is intended to be educational supplement that highlights some of the important points of what we have previously discussed.

## 2024-06-24 NOTE — PROGRESS NOTES
CT staff is aware of the patient's blood sugar. The patient stated that she is feeling better and that it was warm where the procedure was done. She thinks that's  why she was feeling weak. Patient's call bell is within reach. Will continue to monitor the patient.

## 2024-06-24 NOTE — PROCEDURES
RADIOLOGY POST PROCEDURE NOTE     June 24, 2024       11:12 AM     Preoperative Diagnosis:   Right lung nodule    Postoperative Diagnosis:  Same.    :  Hugo Abel MD    Assistant:  None.    Type of Anesthesia: 1% plain lidocaine, moderate sedation    Procedure/Description:  Image-guided right lung nodule biopsy    Findings:   Successful biopsy of right upper lung nodule, 5 passes with 20g needle. No immediate complications.    Estimated blood Loss:  Minimal    Specimen Removed:  Yes    Blood transfusions:  None.    Implants:  None.    Complications: None    Condition: Stable    Blood thinning medications: OK to resume as clinically indicated    Discharge Plan:   discharge home if both chest x-rays are negative and patient is asymptomatic.     Hugo Abel MD

## 2024-06-24 NOTE — PROGRESS NOTES
Patient was transferred from CT via stretcher to Phase II by Luciana Matias RN. Patient wrote down on a sheet of paper that she felt weak and wanted to know what her blood sugar was. This nurse checked the patient's blood sugar and  it was 175. Patient resting quietly on stretcher. Will notify provider in CT what the patient's blood sugar is.

## 2024-06-24 NOTE — PROGRESS NOTES
TRANSFER - OUT REPORT:    Verbal report given to Tata CROCKETT on Felicity Oquendo  being transferred to Phase 2 for routine post-op       Report consisted of patient's Situation, Background, Assessment and   Recommendations(SBAR).     Information from the following report(s) Nurse Handoff Report was reviewed with the receiving nurse.           Lines:   Peripheral IV 06/24/24 Left Antecubital (Active)        Opportunity for questions and clarification was provided.      Patient transported with:  Registered Nurse

## 2024-06-24 NOTE — PERIOP NOTE
Patient /Family /Designee has been informed that [facility] is not responsible for patient belongings per policy and the signed [facility] Patient Agreement document.  Personal items should be sent home or checked in with security.  Patient /Family /Designee selected the following action:                            [x]  Send personal items home with a family member or friend                                                 []  Check in personal items with security, excluding clothing                            []  Maintain personal items at the bedside, against recommendation                                 by Benito Perea [facility]                                   ** If patient /family /designee chooses to maintain personal items at the bedside,                                      Complete the patient belongings inventory in the EMR.

## 2024-06-24 NOTE — H&P
History and Physical    Patient: Felicity Oquendo           Sex: female       DOA: 2024  YOB: 1950      Age:  73 y.o.     LOS:  LOS: 0 days        HPI:     Felicity Oquendo is a 73 y.o. female with history of COPD and new imaging findings of a right lung nodule and mediastinal adenopathy here for a right lung nodule biopsy with moderate sedation.    Past Medical History:   Diagnosis Date    COPD (chronic obstructive pulmonary disease) (HCC)     Diabetes (HCC)     GERD (gastroesophageal reflux disease)     HLD (hyperlipidemia)     HTN (hypertension)     Hypothyroidism     Vertigo        No past surgical history on file.    Family History   Problem Relation Age of Onset    Breast Cancer Sister     Cancer Brother     COPD Brother        Social History     Socioeconomic History    Marital status:    Tobacco Use    Smoking status: Former     Current packs/day: 0.00     Average packs/day: 1 pack/day for 30.4 years (30.0 ttl pk-yrs)     Types: Cigarettes     Start date:      Quit date: 1994     Years since quittin.0   Substance and Sexual Activity    Alcohol use: Yes     Alcohol/week: 0.0 standard drinks of alcohol    Drug use: No       Prior to Admission medications    Medication Sig Start Date End Date Taking? Authorizing Provider   aspirin 81 MG EC tablet Take 1 tablet by mouth daily  Patient not taking: Reported on 2024 1/10/23   Hui House MD   albuterol sulfate HFA (PROVENTIL HFA) 108 (90 Base) MCG/ACT inhaler Inhale 2 puffs into the lungs every 6 hours as needed for Wheezing 24   Maria L Santos MD   metFORMIN (GLUCOPHAGE-XR) 500 MG extended release tablet  3/4/24   ProviderHui MD   FLONASE ALLERGY RELIEF 50 MCG/ACT nasal spray 1-2 sprays by Nasal route    Hui House MD   omeprazole (PRILOSEC) 40 MG delayed release capsule Take 1 capsule by mouth daily    Hiu House MD   celecoxib (CELEBREX) 200 MG capsule Take 1

## 2024-06-25 ENCOUNTER — TELEPHONE (OUTPATIENT)
Age: 74
End: 2024-06-25

## 2024-06-25 DIAGNOSIS — C34.91 NON-SMALL CELL CANCER OF RIGHT LUNG (HCC): Primary | ICD-10-CM

## 2024-06-25 NOTE — TELEPHONE ENCOUNTER
Called and discussed PET/CT and biopsy results with pt,  and daughter. Immune studies pending.   Will schedule EBUS with Dr. Velasquez.   Referral to Oncology.

## 2024-06-26 ENCOUNTER — PREP FOR PROCEDURE (OUTPATIENT)
Age: 74
End: 2024-06-26

## 2024-06-26 ENCOUNTER — TELEPHONE (OUTPATIENT)
Age: 74
End: 2024-06-26

## 2024-06-26 DIAGNOSIS — R91.1 LUNG NODULE: ICD-10-CM

## 2024-06-27 RX ORDER — ROSUVASTATIN CALCIUM 5 MG/1
5 TABLET, COATED ORAL DAILY
COMMUNITY

## 2024-06-27 RX ORDER — ESCITALOPRAM OXALATE 5 MG/1
5 TABLET ORAL DAILY
COMMUNITY

## 2024-06-27 RX ORDER — ACETAMINOPHEN 500 MG
1000 TABLET ORAL EVERY 6 HOURS PRN
COMMUNITY

## 2024-06-27 NOTE — PERIOP NOTE
Instructions for your surgery at [facility]      Today's Date:  6/27/2024      Patient's Name:  Felicity Oquendo           Surgery Date:  07/02/2024              Please enter the main entrance of the hospital and check-in at the  located in the lobby. Once checked in at the , you will take the elevators to the second floor, and report to the waiting room on the left. The room will say Procedure Registration.    Do NOT eat or drink anything, including candy, gum, or ice chips after midnight prior to your surgery, unless you have specific instructions from your surgeon or anesthesia provider to do so.  Brush your teeth before coming to the hospital. You may swish with water, but do not swallow.  No smoking/Vaping/E-Cigarettes 24 hours prior to the day of surgery.  No alcohol 24 hours prior to the day of surgery.  No recreational drugs for one week prior to the day of surgery.  Bring Photo ID, Insurance information, and Co-pay if required on day of surgery.  Bring in pertinent legal documents, such as, Medical Power of , DNR, Advance Directive, etc.  Leave all valuables, including money/purse, at home.  Remove all jewelry, including ALL body piercings, nail polish, acrylic nails, and makeup (including mascara); no lotions, powders, deodorant, or perfume/cologne/after shave on the skin.  Follow instruction for Hibiclens washes and CHG wipes from surgeon's office.   Glasses and dentures may be worn to the hospital. They must be removed prior to surgery. Please bring case/container for glasses or dentures.   Contact lenses should not be worn on day of surgery.   Call your doctor's office if symptoms of a cold or illness develop within 24-48 hours prior to your surgery.  Call your doctor's office if you have any questions concerning insurance or co-pays.  15. AN ADULT (relative or friend 18 years or older) MUST DRIVE YOU HOME AFTER YOUR SURGERY.  16. Please make

## 2024-07-01 ENCOUNTER — TELEPHONE (OUTPATIENT)
Age: 74
End: 2024-07-01

## 2024-07-01 ENCOUNTER — ANESTHESIA EVENT (OUTPATIENT)
Facility: HOSPITAL | Age: 74
End: 2024-07-01
Payer: MEDICARE

## 2024-07-01 LAB
DLCO: NORMAL
FEV1/FVC: NORMAL
FEV1: NORMAL
FVC: NORMAL
TLC: NORMAL

## 2024-07-01 NOTE — TELEPHONE ENCOUNTER
Pt  called in stating he would like to know results of PFT done on 6/17 done at [facility]. Pt  was trying to acces results on Mychart but wasn't able to. He states he just want to know if something is wrong or not. Pls advise [phone]

## 2024-07-02 ENCOUNTER — ANESTHESIA (OUTPATIENT)
Facility: HOSPITAL | Age: 74
End: 2024-07-02
Payer: MEDICARE

## 2024-07-02 ENCOUNTER — HOSPITAL ENCOUNTER (OUTPATIENT)
Facility: HOSPITAL | Age: 74
Setting detail: OUTPATIENT SURGERY
Discharge: HOME | End: 2024-07-02
Attending: INTERNAL MEDICINE | Admitting: INTERNAL MEDICINE
Payer: MEDICARE

## 2024-07-02 ENCOUNTER — APPOINTMENT (OUTPATIENT)
Facility: HOSPITAL | Age: 74
End: 2024-07-02
Attending: INTERNAL MEDICINE
Payer: MEDICARE

## 2024-07-02 VITALS
HEART RATE: 99 BPM | WEIGHT: 215 LBS | BODY MASS INDEX: 35.82 KG/M2 | HEIGHT: 65 IN | SYSTOLIC BLOOD PRESSURE: 156 MMHG | RESPIRATION RATE: 17 BRPM | DIASTOLIC BLOOD PRESSURE: 68 MMHG | OXYGEN SATURATION: 96 % | TEMPERATURE: 98.9 F

## 2024-07-02 DIAGNOSIS — R91.1 LUNG NODULE: Primary | ICD-10-CM

## 2024-07-02 PROBLEM — R59.0 MEDIASTINAL ADENOPATHY: Status: ACTIVE | Noted: 2024-07-02

## 2024-07-02 PROBLEM — C34.91 SQUAMOUS CELL CARCINOMA OF RIGHT LUNG (HCC): Status: ACTIVE | Noted: 2024-07-02

## 2024-07-02 LAB
DIFFERENTIAL: NORMAL
DIFFERENTIAL: NORMAL
EOSINOPHIL NFR BRONCH MANUAL: 0 %
EOSINOPHIL NFR BRONCH MANUAL: 4 %
GLUCOSE BLD STRIP.AUTO-MCNC: 126 MG/DL (ref 70–110)
GLUCOSE BLD STRIP.AUTO-MCNC: 178 MG/DL (ref 70–110)
LYMPHOCYTES NFR BRONCH MANUAL: 23 %
LYMPHOCYTES NFR BRONCH MANUAL: 28 %
MACROPHAGES NFR BRONCH MANUAL: 20 %
MACROPHAGES NFR BRONCH MANUAL: 57 %
NEUTROPHILS NFR BRONCH MANUAL: 20 %
NEUTROPHILS NFR BRONCH MANUAL: 48 %

## 2024-07-02 PROCEDURE — 87205 SMEAR GRAM STAIN: CPT

## 2024-07-02 PROCEDURE — 88173 CYTOPATH EVAL FNA REPORT: CPT

## 2024-07-02 PROCEDURE — 87206 SMEAR FLUORESCENT/ACID STAI: CPT

## 2024-07-02 PROCEDURE — 3600007512: Performed by: INTERNAL MEDICINE

## 2024-07-02 PROCEDURE — 6370000000 HC RX 637 (ALT 250 FOR IP): Performed by: STUDENT IN AN ORGANIZED HEALTH CARE EDUCATION/TRAINING PROGRAM

## 2024-07-02 PROCEDURE — 87798 DETECT AGENT NOS DNA AMP: CPT

## 2024-07-02 PROCEDURE — 87070 CULTURE OTHR SPECIMN AEROBIC: CPT

## 2024-07-02 PROCEDURE — 7100000001 HC PACU RECOVERY - ADDTL 15 MIN: Performed by: INTERNAL MEDICINE

## 2024-07-02 PROCEDURE — 87116 MYCOBACTERIA CULTURE: CPT

## 2024-07-02 PROCEDURE — 2500000003 HC RX 250 WO HCPCS: Performed by: INTERNAL MEDICINE

## 2024-07-02 PROCEDURE — 88172 CYTP DX EVAL FNA 1ST EA SITE: CPT

## 2024-07-02 PROCEDURE — 3700000000 HC ANESTHESIA ATTENDED CARE: Performed by: INTERNAL MEDICINE

## 2024-07-02 PROCEDURE — 6360000002 HC RX W HCPCS: Performed by: INTERNAL MEDICINE

## 2024-07-02 PROCEDURE — 7100000010 HC PHASE II RECOVERY - FIRST 15 MIN: Performed by: INTERNAL MEDICINE

## 2024-07-02 PROCEDURE — 31624 DX BRONCHOSCOPE/LAVAGE: CPT | Performed by: INTERNAL MEDICINE

## 2024-07-02 PROCEDURE — 31653 BRONCH EBUS SAMPLNG 3/> NODE: CPT | Performed by: INTERNAL MEDICINE

## 2024-07-02 PROCEDURE — 2580000003 HC RX 258: Performed by: STUDENT IN AN ORGANIZED HEALTH CARE EDUCATION/TRAINING PROGRAM

## 2024-07-02 PROCEDURE — 3700000001 HC ADD 15 MINUTES (ANESTHESIA): Performed by: INTERNAL MEDICINE

## 2024-07-02 PROCEDURE — 82962 GLUCOSE BLOOD TEST: CPT

## 2024-07-02 PROCEDURE — 31645 BRNCHSC W/THER ASPIR 1ST: CPT | Performed by: INTERNAL MEDICINE

## 2024-07-02 PROCEDURE — 88112 CYTOPATH CELL ENHANCE TECH: CPT

## 2024-07-02 PROCEDURE — 87102 FUNGUS ISOLATION CULTURE: CPT

## 2024-07-02 PROCEDURE — 2709999900 HC NON-CHARGEABLE SUPPLY: Performed by: INTERNAL MEDICINE

## 2024-07-02 PROCEDURE — 7100000000 HC PACU RECOVERY - FIRST 15 MIN: Performed by: INTERNAL MEDICINE

## 2024-07-02 PROCEDURE — 71045 X-RAY EXAM CHEST 1 VIEW: CPT

## 2024-07-02 PROCEDURE — 2500000003 HC RX 250 WO HCPCS: Performed by: STUDENT IN AN ORGANIZED HEALTH CARE EDUCATION/TRAINING PROGRAM

## 2024-07-02 PROCEDURE — 88177 CYTP FNA EVAL EA ADDL: CPT

## 2024-07-02 PROCEDURE — 2720000010 HC SURG SUPPLY STERILE: Performed by: INTERNAL MEDICINE

## 2024-07-02 PROCEDURE — 89051 BODY FLUID CELL COUNT: CPT

## 2024-07-02 PROCEDURE — 3600007502: Performed by: INTERNAL MEDICINE

## 2024-07-02 PROCEDURE — 6360000002 HC RX W HCPCS: Performed by: STUDENT IN AN ORGANIZED HEALTH CARE EDUCATION/TRAINING PROGRAM

## 2024-07-02 PROCEDURE — 88305 TISSUE EXAM BY PATHOLOGIST: CPT

## 2024-07-02 PROCEDURE — 2580000003 HC RX 258: Performed by: INTERNAL MEDICINE

## 2024-07-02 PROCEDURE — 7100000011 HC PHASE II RECOVERY - ADDTL 15 MIN: Performed by: INTERNAL MEDICINE

## 2024-07-02 RX ORDER — SUCCINYLCHOLINE/SOD CL,ISO/PF 100 MG/5ML
SYRINGE (ML) INTRAVENOUS PRN
Status: DISCONTINUED | OUTPATIENT
Start: 2024-07-02 | End: 2024-07-02 | Stop reason: SDUPTHER

## 2024-07-02 RX ORDER — NEOSTIGMINE METHYLSULFATE 1 MG/ML
INJECTION, SOLUTION INTRAVENOUS PRN
Status: DISCONTINUED | OUTPATIENT
Start: 2024-07-02 | End: 2024-07-02 | Stop reason: SDUPTHER

## 2024-07-02 RX ORDER — ONDANSETRON 2 MG/ML
4 INJECTION INTRAMUSCULAR; INTRAVENOUS
Status: DISCONTINUED | OUTPATIENT
Start: 2024-07-02 | End: 2024-07-02 | Stop reason: HOSPADM

## 2024-07-02 RX ORDER — DEXAMETHASONE SODIUM PHOSPHATE 4 MG/ML
INJECTION, SOLUTION INTRA-ARTICULAR; INTRALESIONAL; INTRAMUSCULAR; INTRAVENOUS; SOFT TISSUE PRN
Status: DISCONTINUED | OUTPATIENT
Start: 2024-07-02 | End: 2024-07-02 | Stop reason: SDUPTHER

## 2024-07-02 RX ORDER — DIPHENHYDRAMINE HYDROCHLORIDE 50 MG/ML
12.5 INJECTION INTRAMUSCULAR; INTRAVENOUS
Status: DISCONTINUED | OUTPATIENT
Start: 2024-07-02 | End: 2024-07-02 | Stop reason: HOSPADM

## 2024-07-02 RX ORDER — FUROSEMIDE 10 MG/ML
20 INJECTION INTRAMUSCULAR; INTRAVENOUS ONCE
Status: COMPLETED | OUTPATIENT
Start: 2024-07-02 | End: 2024-07-02

## 2024-07-02 RX ORDER — LABETALOL 20 MG/4 ML (5 MG/ML) INTRAVENOUS SYRINGE
10
Status: DISCONTINUED | OUTPATIENT
Start: 2024-07-02 | End: 2024-07-02 | Stop reason: HOSPADM

## 2024-07-02 RX ORDER — SODIUM CHLORIDE, SODIUM LACTATE, POTASSIUM CHLORIDE, CALCIUM CHLORIDE 600; 310; 30; 20 MG/100ML; MG/100ML; MG/100ML; MG/100ML
INJECTION, SOLUTION INTRAVENOUS CONTINUOUS PRN
Status: DISCONTINUED | OUTPATIENT
Start: 2024-07-02 | End: 2024-07-02 | Stop reason: SDUPTHER

## 2024-07-02 RX ORDER — ROCURONIUM BROMIDE 10 MG/ML
INJECTION, SOLUTION INTRAVENOUS PRN
Status: DISCONTINUED | OUTPATIENT
Start: 2024-07-02 | End: 2024-07-02 | Stop reason: SDUPTHER

## 2024-07-02 RX ORDER — FENTANYL CITRATE 50 UG/ML
INJECTION, SOLUTION INTRAMUSCULAR; INTRAVENOUS PRN
Status: DISCONTINUED | OUTPATIENT
Start: 2024-07-02 | End: 2024-07-02 | Stop reason: SDUPTHER

## 2024-07-02 RX ORDER — DEXTROSE MONOHYDRATE 100 MG/ML
INJECTION, SOLUTION INTRAVENOUS CONTINUOUS PRN
Status: DISCONTINUED | OUTPATIENT
Start: 2024-07-02 | End: 2024-07-02 | Stop reason: HOSPADM

## 2024-07-02 RX ORDER — LIDOCAINE HYDROCHLORIDE 10 MG/ML
1 INJECTION, SOLUTION EPIDURAL; INFILTRATION; INTRACAUDAL; PERINEURAL
Status: DISCONTINUED | OUTPATIENT
Start: 2024-07-02 | End: 2024-07-02 | Stop reason: HOSPADM

## 2024-07-02 RX ORDER — INSULIN LISPRO 100 [IU]/ML
0-15 INJECTION, SOLUTION INTRAVENOUS; SUBCUTANEOUS ONCE
Status: DISCONTINUED | OUTPATIENT
Start: 2024-07-02 | End: 2024-07-02 | Stop reason: HOSPADM

## 2024-07-02 RX ORDER — PROPOFOL 10 MG/ML
INJECTION, EMULSION INTRAVENOUS PRN
Status: DISCONTINUED | OUTPATIENT
Start: 2024-07-02 | End: 2024-07-02 | Stop reason: SDUPTHER

## 2024-07-02 RX ORDER — LIDOCAINE HYDROCHLORIDE 20 MG/ML
INJECTION, SOLUTION EPIDURAL; INFILTRATION; INTRACAUDAL; PERINEURAL PRN
Status: DISCONTINUED | OUTPATIENT
Start: 2024-07-02 | End: 2024-07-02 | Stop reason: SDUPTHER

## 2024-07-02 RX ORDER — IPRATROPIUM BROMIDE AND ALBUTEROL SULFATE 2.5; .5 MG/3ML; MG/3ML
1 SOLUTION RESPIRATORY (INHALATION)
Status: COMPLETED | OUTPATIENT
Start: 2024-07-02 | End: 2024-07-02

## 2024-07-02 RX ORDER — PROCHLORPERAZINE EDISYLATE 5 MG/ML
5 INJECTION INTRAMUSCULAR; INTRAVENOUS
Status: DISCONTINUED | OUTPATIENT
Start: 2024-07-02 | End: 2024-07-02 | Stop reason: HOSPADM

## 2024-07-02 RX ORDER — NALOXONE HYDROCHLORIDE 0.4 MG/ML
INJECTION, SOLUTION INTRAMUSCULAR; INTRAVENOUS; SUBCUTANEOUS PRN
Status: DISCONTINUED | OUTPATIENT
Start: 2024-07-02 | End: 2024-07-02 | Stop reason: HOSPADM

## 2024-07-02 RX ORDER — ONDANSETRON 2 MG/ML
INJECTION INTRAMUSCULAR; INTRAVENOUS PRN
Status: DISCONTINUED | OUTPATIENT
Start: 2024-07-02 | End: 2024-07-02 | Stop reason: SDUPTHER

## 2024-07-02 RX ORDER — PHENYLEPHRINE HCL IN 0.9% NACL 1 MG/10 ML
SYRINGE (ML) INTRAVENOUS PRN
Status: DISCONTINUED | OUTPATIENT
Start: 2024-07-02 | End: 2024-07-02 | Stop reason: SDUPTHER

## 2024-07-02 RX ORDER — GLYCOPYRROLATE 0.2 MG/ML
INJECTION INTRAMUSCULAR; INTRAVENOUS PRN
Status: DISCONTINUED | OUTPATIENT
Start: 2024-07-02 | End: 2024-07-02 | Stop reason: SDUPTHER

## 2024-07-02 RX ORDER — HYDRALAZINE HYDROCHLORIDE 20 MG/ML
10 INJECTION INTRAMUSCULAR; INTRAVENOUS
Status: DISCONTINUED | OUTPATIENT
Start: 2024-07-02 | End: 2024-07-02 | Stop reason: HOSPADM

## 2024-07-02 RX ORDER — SODIUM CHLORIDE, SODIUM LACTATE, POTASSIUM CHLORIDE, CALCIUM CHLORIDE 600; 310; 30; 20 MG/100ML; MG/100ML; MG/100ML; MG/100ML
INJECTION, SOLUTION INTRAVENOUS CONTINUOUS
Status: DISCONTINUED | OUTPATIENT
Start: 2024-07-02 | End: 2024-07-02 | Stop reason: HOSPADM

## 2024-07-02 RX ADMIN — ROCURONIUM BROMIDE 10 MG: 50 INJECTION INTRAVENOUS at 08:38

## 2024-07-02 RX ADMIN — Medication 100 MCG: at 09:14

## 2024-07-02 RX ADMIN — NEOSTIGMINE METHYLSULFATE 3 MG: 1 INJECTION, SOLUTION INTRAVENOUS at 10:08

## 2024-07-02 RX ADMIN — ROCURONIUM BROMIDE 30 MG: 50 INJECTION INTRAVENOUS at 07:54

## 2024-07-02 RX ADMIN — DEXAMETHASONE SODIUM PHOSPHATE 4 MG: 4 INJECTION, SOLUTION INTRAMUSCULAR; INTRAVENOUS at 08:00

## 2024-07-02 RX ADMIN — IPRATROPIUM BROMIDE AND ALBUTEROL SULFATE 1 DOSE: .5; 3 SOLUTION RESPIRATORY (INHALATION) at 10:34

## 2024-07-02 RX ADMIN — Medication 100 MCG: at 09:50

## 2024-07-02 RX ADMIN — FENTANYL CITRATE 50 MCG: 50 INJECTION INTRAMUSCULAR; INTRAVENOUS at 07:47

## 2024-07-02 RX ADMIN — Medication 100 MG: at 07:47

## 2024-07-02 RX ADMIN — SODIUM CHLORIDE, SODIUM LACTATE, POTASSIUM CHLORIDE, AND CALCIUM CHLORIDE: 600; 310; 30; 20 INJECTION, SOLUTION INTRAVENOUS at 07:39

## 2024-07-02 RX ADMIN — Medication 100 MCG: at 08:06

## 2024-07-02 RX ADMIN — Medication 100 MCG: at 09:28

## 2024-07-02 RX ADMIN — GLYCOPYRROLATE 0.6 MG: 0.2 INJECTION INTRAMUSCULAR; INTRAVENOUS at 10:08

## 2024-07-02 RX ADMIN — Medication 100 MCG: at 09:37

## 2024-07-02 RX ADMIN — SODIUM CHLORIDE, PRESERVATIVE FREE 20 MG: 5 INJECTION INTRAVENOUS at 06:34

## 2024-07-02 RX ADMIN — Medication 100 MCG: at 08:27

## 2024-07-02 RX ADMIN — Medication 200 MCG: at 07:59

## 2024-07-02 RX ADMIN — Medication 200 MCG: at 08:51

## 2024-07-02 RX ADMIN — ROCURONIUM BROMIDE 10 MG: 50 INJECTION INTRAVENOUS at 09:06

## 2024-07-02 RX ADMIN — PROPOFOL 200 MG: 10 INJECTION, EMULSION INTRAVENOUS at 07:47

## 2024-07-02 RX ADMIN — GLYCOPYRROLATE 0.2 MG: 0.2 INJECTION INTRAMUSCULAR; INTRAVENOUS at 08:00

## 2024-07-02 RX ADMIN — Medication 200 MCG: at 08:08

## 2024-07-02 RX ADMIN — FUROSEMIDE 20 MG: 10 INJECTION, SOLUTION INTRAMUSCULAR; INTRAVENOUS at 10:36

## 2024-07-02 RX ADMIN — Medication 100 MCG: at 08:36

## 2024-07-02 RX ADMIN — ONDANSETRON 4 MG: 2 INJECTION INTRAMUSCULAR; INTRAVENOUS at 08:00

## 2024-07-02 RX ADMIN — FENTANYL CITRATE 25 MCG: 50 INJECTION INTRAMUSCULAR; INTRAVENOUS at 08:38

## 2024-07-02 RX ADMIN — Medication 100 MCG: at 09:06

## 2024-07-02 RX ADMIN — Medication 200 MCG: at 08:44

## 2024-07-02 RX ADMIN — Medication 100 MCG: at 08:18

## 2024-07-02 RX ADMIN — FENTANYL CITRATE 25 MCG: 50 INJECTION INTRAMUSCULAR; INTRAVENOUS at 08:14

## 2024-07-02 RX ADMIN — LIDOCAINE HYDROCHLORIDE 100 MG: 20 INJECTION, SOLUTION EPIDURAL; INFILTRATION; INTRACAUDAL; PERINEURAL at 07:47

## 2024-07-02 ASSESSMENT — COPD QUESTIONNAIRES: CAT_SEVERITY: MILD

## 2024-07-02 ASSESSMENT — PAIN - FUNCTIONAL ASSESSMENT
PAIN_FUNCTIONAL_ASSESSMENT: 0-10
PAIN_FUNCTIONAL_ASSESSMENT: NONE - DENIES PAIN

## 2024-07-02 ASSESSMENT — ENCOUNTER SYMPTOMS
SHORTNESS OF BREATH: 1
WHEEZING: 0
ABDOMINAL DISTENTION: 0
ABDOMINAL PAIN: 0
COUGH: 0

## 2024-07-02 NOTE — H&P
[facility].  Pulmonary, Critical Care, and Sleep Medicine.    Name: Felicity Oquendo MRN: [id number]   : 1950 Hospital: [facility]   Date: 2024  Admission Date: 2024                                                                                   Interval H&P.    HPI:    Felicity Oquendo is a 74 y.o. female with a PMHx of COPD, DM, GERD, HTN, HLD, Hypothyroidism, and recent diagnosis of Squamous cell carcinoma of the lung who presents today for Flex bronchoscopy/EBUS with BAL and Biopsy for staging  Last meal was 2100 last night. Not on anticoagulation.  She is here today with her  and daughter; she gives consent to discuss intra-procedure findings with them.               Review of Systems   Constitutional:  Negative for chills, fatigue and fever.   Respiratory:  Positive for shortness of breath. Negative for cough and wheezing.    Cardiovascular:  Negative for chest pain, palpitations and leg swelling.   Gastrointestinal:  Negative for abdominal distention and abdominal pain.   Neurological:  Negative for dizziness and light-headedness.       Vital Signs:  BP (!) 150/84   Pulse 71   Temp 97.9 °F (36.6 °C) (Oral)   Resp 16   Ht 1.651 m (5' 5\")   Wt 97.5 kg (215 lb)   SpO2 97%   BMI 35.78 kg/m²             Temp (24hrs), Av.9 °F (36.6 °C), Min:97.9 °F (36.6 °C), Max:97.9 °F (36.6 °C)       Physical Exam:    General: in no apparent distress, alert, and oriented times 3   Lungs: normal air entry, no wheezes or rhonchi.   Heart: Regular rate and rhythm or without murmur or extra heart sounds   Abdomen: non distended, bowel sounds normoactive, abdomen is soft without significant tenderness, masses, organomegaly or guarding      DATA:  MAR reviewed and pertinent medications noted or modified as needed    Labs:  No results for input(s): \"WBC\", \"HGB\", \"HCT\", \"PLT\" in the last 72 hours.  No results for input(s): \"NA\", \"K\", \"CL\", \"CO2\", \"GLU\", \"BUN\", \"MG\",

## 2024-07-02 NOTE — PERIOP NOTE
Patient /Family /Designee has been informed that [facility] is not responsible for patient belongings per policy and the signed [facility] Patient Agreement document.  Personal items should be sent home or checked in with security.  Patient /Family /Designee selected the following action:                            [x]  Send personal items home with a family member or friend                                                 []  Check in personal items with security, excluding clothing                            []  Maintain personal items at the bedside, against recommendation                                 by [facility]                                   ** If patient /family /designee chooses to maintain personal items at the bedside,                                      Complete the patient belongings inventory in the EMR.

## 2024-07-02 NOTE — ANESTHESIA PRE PROCEDURE
Department of Anesthesiology  Preprocedure Note       Name:  Felicity Oquendo   Age:  74 y.o.  :  1950                                          MRN:  [id number]         Date:  2024      Surgeon: Surgeon(s):  Mayank Velasquez DO    Procedure: Procedure(s):  BRONCHOSCOPY/TRANSBRONCHIAL LUNG BIOPSY; C-ARM    Medications prior to admission:   Prior to Admission medications    Medication Sig Start Date End Date Taking? Authorizing Provider   escitalopram (LEXAPRO) 5 MG tablet Take 1 tablet by mouth daily   Yes Hui House MD   rosuvastatin (CRESTOR) 5 MG tablet Take 1 tablet by mouth daily   Yes Hui House MD   acetaminophen (TYLENOL) 500 MG tablet Take 2 tablets by mouth every 6 hours as needed for Pain   Yes Hui House MD   metFORMIN (GLUCOPHAGE) 500 MG tablet Take 1 tablet by mouth 2 times daily (with meals)    Hui House MD   albuterol sulfate HFA (PROVENTIL HFA) 108 (90 Base) MCG/ACT inhaler Inhale 2 puffs into the lungs every 6 hours as needed for Wheezing 24   Maria L Santos MD   FLONASE ALLERGY RELIEF 50 MCG/ACT nasal spray 1-2 sprays by Nasal route daily as needed for Allergies or Rhinitis    Hui House MD   omeprazole (PRILOSEC) 40 MG delayed release capsule Take 1 capsule by mouth daily    Hui House MD   celecoxib (CELEBREX) 200 MG capsule Take 1 capsule by mouth 23   Hui House MD   metoprolol succinate (TOPROL XL) 25 MG extended release tablet Take 1 tablet by mouth daily    Hui House MD   SITagliptin (JANUVIA) 100 MG tablet Take 1 tablet by mouth daily    Hui House MD   levothyroxine (SYNTHROID) 50 MCG tablet Take 1 tablet by mouth Daily    Hui House MD   liothyronine (CYTOMEL) 25 MCG tablet Take 1 tablet by mouth daily    Hui House MD       Current medications:    No current facility-administered medications for this encounter.       Allergies:    Allergies

## 2024-07-02 NOTE — OP NOTE
endotracheal tube with immediate visualization of the trachea. Detailed airway inspection was subsequently performed beginning with the left lung.  The distal trachea and main yesika were anatomically normal and unremarkable.  The LMB, KHANG, and LLL were anatomically normal.  Attention was then turned to the right lung.  The RMB, RUL, BI, RML, and RLL were anatomically normal. Scattered secretions were seen in right or left mainstem bronchi and cleared with therapeutic saline and suctioned clear. The bronchial mucosa was hyperemic with mucosal changes of chronic bronchitis. No active bleeding, endobronchial lesions, or granulation tissue was identified.     Bronchoalveolar lavage was obtained from the RML (lateral segment; 100 cc of saline instilled; 20cc of cloudy fluid return) and RUL (apical segment; 100 cc of saline instilled; 19 cc of clear fluid return).    After satisfactory inspection with no active bleeding, the slim bronchoscope was removed from the endotracheal tube.     EBUS  Secondly, I introduced an EBUS scope through the 8.5 ETT all the way to the trachea.  A complete lymph node ultrasound survey was performed.   I did  appreciate pathologically/enlarged LNs in stations: 11L, 11R, 4R.  EBUS directed FNA Biopsies were obtained at 11L, 4L, 4R and 11R stations. Sampling was performed as indicated above using a 21 gauge needle.  Other stations did not demonstrate pathologic appearing lymph nodes greater than 4 mm.  On-site cytology was present but no intra-procedural diagnosis of was made.  I withdrew the Video linear endobronchial ultrscound bronchoscope all the way out after confirmation of excellent hemostasis.    A portable CXR will be ordered to rule out PTX.    The patient was transferred to recovery room in stable condition as directed by the anesthesia department.     Specimen:    1. Bronchoalveolar lavage of the RML and RUL.  2. Transbronchial needle aspiration of lymph nodes - 11L, 4L 4R, and 11R

## 2024-07-02 NOTE — ANESTHESIA POSTPROCEDURE EVALUATION
Department of Anesthesiology  Postprocedure Note    Patient: Felicity Oquendo  MRN: [id number]  YOB: 1950  Date of evaluation: 7/2/2024    Procedure Summary       Date: 07/02/24 Room / Location: [facility] ENDO 01 / [facility] ENDOSCOPY    Anesthesia Start: 0739 Anesthesia Stop: 1036    Procedure: BRONCHOSCOPY/TRANSBRONCHIAL LUNG BIOPSY; C-ARM (Chest) Diagnosis:       Lung nodule      (Lung nodule [R91.1])    Surgeons: Mayank Velasquez DO Responsible Provider: Kamaljit Rodriguez MD    Anesthesia Type: General ASA Status: 3            Anesthesia Type: General    Michelle Phase I: Michelle Score: 10    Michelle Phase II:      Anesthesia Post Evaluation    Patient location during evaluation: PACU  Patient participation: complete - patient participated  Level of consciousness: sleepy but conscious  Pain score: 0  Airway patency: patent  Nausea & Vomiting: no nausea and no vomiting  Cardiovascular status: blood pressure returned to baseline  Respiratory status: acceptable  Hydration status: euvolemic  Pain management: adequate    No notable events documented.

## 2024-07-02 NOTE — PERIOP NOTE
I contacted Dr. Velasquez - updated him with the results of the CXR.  No pneumothorax noted.  Telephone order given okay to discharge pt.  I also informed Dr. Rodriguez regarding accu check of 178.  Pt not on Insulin at home.  Telephone order given - not to cover blood sugar..

## 2024-07-04 ENCOUNTER — APPOINTMENT (OUTPATIENT)
Facility: HOSPITAL | Age: 74
End: 2024-07-04
Payer: MEDICARE

## 2024-07-04 ENCOUNTER — HOSPITAL ENCOUNTER (EMERGENCY)
Facility: HOSPITAL | Age: 74
Discharge: HOME | End: 2024-07-04
Attending: EMERGENCY MEDICINE
Payer: MEDICARE

## 2024-07-04 VITALS
SYSTOLIC BLOOD PRESSURE: 163 MMHG | WEIGHT: 223 LBS | BODY MASS INDEX: 37.15 KG/M2 | RESPIRATION RATE: 23 BRPM | HEIGHT: 65 IN | TEMPERATURE: 98.1 F | OXYGEN SATURATION: 94 % | DIASTOLIC BLOOD PRESSURE: 73 MMHG | HEART RATE: 109 BPM

## 2024-07-04 DIAGNOSIS — J40 BRONCHITIS: ICD-10-CM

## 2024-07-04 DIAGNOSIS — J44.1 COPD EXACERBATION (HCC): Primary | ICD-10-CM

## 2024-07-04 LAB
ACID FAST STN SPEC: NEGATIVE
ALBUMIN SERPL-MCNC: 3.3 G/DL (ref 3.4–5)
ALBUMIN/GLOB SERPL: 0.8 (ref 0.8–1.7)
ALP SERPL-CCNC: 69 U/L (ref 45–117)
ALT SERPL-CCNC: 21 U/L (ref 13–56)
ANION GAP SERPL CALC-SCNC: 10 MMOL/L (ref 3–18)
AST SERPL-CCNC: 15 U/L (ref 10–38)
BACTERIA SPEC CULT: NORMAL
BACTERIA SPEC CULT: NORMAL
BASOPHILS # BLD: 0 K/UL (ref 0–0.1)
BASOPHILS NFR BLD: 1 % (ref 0–2)
BILIRUB SERPL-MCNC: 0.2 MG/DL (ref 0.2–1)
BUN SERPL-MCNC: 11 MG/DL (ref 7–18)
BUN/CREAT SERPL: 11 (ref 12–20)
CALCIUM SERPL-MCNC: 8.9 MG/DL (ref 8.5–10.1)
CHLORIDE SERPL-SCNC: 104 MMOL/L (ref 100–111)
CO2 SERPL-SCNC: 27 MMOL/L (ref 21–32)
CREAT SERPL-MCNC: 0.98 MG/DL (ref 0.6–1.3)
DIFFERENTIAL METHOD BLD: ABNORMAL
EOSINOPHIL # BLD: 0.2 K/UL (ref 0–0.4)
EOSINOPHIL NFR BLD: 2 % (ref 0–5)
ERYTHROCYTE [DISTWIDTH] IN BLOOD BY AUTOMATED COUNT: 13.8 % (ref 11.6–14.5)
GLOBULIN SER CALC-MCNC: 4 G/DL (ref 2–4)
GLUCOSE SERPL-MCNC: 109 MG/DL (ref 74–99)
GRAM STN SPEC: NORMAL
HCT VFR BLD AUTO: 34.2 % (ref 35–45)
HGB BLD-MCNC: 10.8 G/DL (ref 12–16)
IMM GRANULOCYTES # BLD AUTO: 0 K/UL (ref 0–0.04)
IMM GRANULOCYTES NFR BLD AUTO: 0 % (ref 0–0.5)
LYMPHOCYTES # BLD: 2.2 K/UL (ref 0.9–3.6)
LYMPHOCYTES NFR BLD: 26 % (ref 21–52)
MCH RBC QN AUTO: 26.5 PG (ref 24–34)
MCHC RBC AUTO-ENTMCNC: 31.6 G/DL (ref 31–37)
MCV RBC AUTO: 84 FL (ref 78–100)
MONOCYTES # BLD: 0.5 K/UL (ref 0.05–1.2)
MONOCYTES NFR BLD: 6 % (ref 3–10)
MYCOBACTERIUM SPEC QL CULT: NORMAL
NEUTS SEG # BLD: 5.6 K/UL (ref 1.8–8)
NEUTS SEG NFR BLD: 66 % (ref 40–73)
NRBC # BLD: 0 K/UL (ref 0–0.01)
NRBC BLD-RTO: 0 PER 100 WBC
NT PRO BNP: 597 PG/ML (ref 0–900)
PLATELET # BLD AUTO: 298 K/UL (ref 135–420)
PMV BLD AUTO: 8.6 FL (ref 9.2–11.8)
POTASSIUM SERPL-SCNC: 4 MMOL/L (ref 3.5–5.5)
PROT SERPL-MCNC: 7.3 G/DL (ref 6.4–8.2)
RBC # BLD AUTO: 4.07 M/UL (ref 4.2–5.3)
SERVICE CMNT-IMP: NORMAL
SERVICE CMNT-IMP: NORMAL
SODIUM SERPL-SCNC: 141 MMOL/L (ref 136–145)
SPECIMEN PREPARATION: NORMAL
SPECIMEN SOURCE: NORMAL
TROPONIN I SERPL HS-MCNC: 15 NG/L (ref 0–54)
TROPONIN I SERPL HS-MCNC: 16 NG/L (ref 0–54)
WBC # BLD AUTO: 8.5 K/UL (ref 4.6–13.2)

## 2024-07-04 PROCEDURE — 6370000000 HC RX 637 (ALT 250 FOR IP): Performed by: EMERGENCY MEDICINE

## 2024-07-04 PROCEDURE — 6360000004 HC RX CONTRAST MEDICATION: Performed by: EMERGENCY MEDICINE

## 2024-07-04 PROCEDURE — 80053 COMPREHEN METABOLIC PANEL: CPT

## 2024-07-04 PROCEDURE — 6360000002 HC RX W HCPCS: Performed by: EMERGENCY MEDICINE

## 2024-07-04 PROCEDURE — 71275 CT ANGIOGRAPHY CHEST: CPT

## 2024-07-04 PROCEDURE — 96374 THER/PROPH/DIAG INJ IV PUSH: CPT

## 2024-07-04 PROCEDURE — 2580000003 HC RX 258: Performed by: EMERGENCY MEDICINE

## 2024-07-04 PROCEDURE — 85025 COMPLETE CBC W/AUTO DIFF WBC: CPT

## 2024-07-04 PROCEDURE — 83880 ASSAY OF NATRIURETIC PEPTIDE: CPT

## 2024-07-04 PROCEDURE — 93005 ELECTROCARDIOGRAM TRACING: CPT | Performed by: EMERGENCY MEDICINE

## 2024-07-04 PROCEDURE — 71045 X-RAY EXAM CHEST 1 VIEW: CPT

## 2024-07-04 PROCEDURE — 84484 ASSAY OF TROPONIN QUANT: CPT

## 2024-07-04 PROCEDURE — 99285 EMERGENCY DEPT VISIT HI MDM: CPT

## 2024-07-04 RX ORDER — ALBUTEROL SULFATE 90 UG/1
2 AEROSOL, METERED RESPIRATORY (INHALATION) EVERY 4 HOURS PRN
Qty: 18 G | Refills: 3 | Status: SHIPPED | OUTPATIENT
Start: 2024-07-04

## 2024-07-04 RX ORDER — PREDNISONE 50 MG/1
50 TABLET ORAL DAILY
Qty: 3 TABLET | Refills: 0 | Status: SHIPPED | OUTPATIENT
Start: 2024-07-04 | End: 2024-07-07

## 2024-07-04 RX ORDER — ALBUTEROL SULFATE 2.5 MG/3ML
2.5 SOLUTION RESPIRATORY (INHALATION)
Status: COMPLETED | OUTPATIENT
Start: 2024-07-04 | End: 2024-07-04

## 2024-07-04 RX ORDER — AZITHROMYCIN 250 MG/1
250 TABLET, FILM COATED ORAL DAILY
Qty: 4 TABLET | Refills: 0 | Status: SHIPPED | OUTPATIENT
Start: 2024-07-04 | End: 2024-07-08

## 2024-07-04 RX ORDER — PREDNISONE 50 MG/1
50 TABLET ORAL DAILY
Qty: 4 TABLET | Refills: 0 | Status: SHIPPED | OUTPATIENT
Start: 2024-07-04 | End: 2024-07-08

## 2024-07-04 RX ORDER — PREDNISONE 20 MG/1
20 TABLET ORAL DAILY
Qty: 10 TABLET | Refills: 0 | Status: SHIPPED | OUTPATIENT
Start: 2024-07-04 | End: 2024-07-14

## 2024-07-04 RX ORDER — ALBUTEROL SULFATE 90 UG/1
2 AEROSOL, METERED RESPIRATORY (INHALATION) EVERY 6 HOURS PRN
Qty: 18 G | Refills: 3 | Status: SHIPPED | OUTPATIENT
Start: 2024-07-04

## 2024-07-04 RX ORDER — ALBUTEROL SULFATE 2.5 MG/3ML
5 SOLUTION RESPIRATORY (INHALATION)
Status: COMPLETED | OUTPATIENT
Start: 2024-07-04 | End: 2024-07-04

## 2024-07-04 RX ORDER — ALBUTEROL SULFATE 2.5 MG/.5ML
2.5 SOLUTION RESPIRATORY (INHALATION) EVERY 6 HOURS PRN
Qty: 100 EACH | Refills: 0 | Status: SHIPPED | OUTPATIENT
Start: 2024-07-04

## 2024-07-04 RX ORDER — AZITHROMYCIN 250 MG/1
500 TABLET, FILM COATED ORAL
Status: COMPLETED | OUTPATIENT
Start: 2024-07-04 | End: 2024-07-04

## 2024-07-04 RX ADMIN — AZITHROMYCIN DIHYDRATE 500 MG: 250 TABLET ORAL at 22:22

## 2024-07-04 RX ADMIN — ALBUTEROL SULFATE 5 MG: 2.5 SOLUTION RESPIRATORY (INHALATION) at 18:37

## 2024-07-04 RX ADMIN — ALBUTEROL SULFATE 2.5 MG: 2.5 SOLUTION RESPIRATORY (INHALATION) at 20:48

## 2024-07-04 RX ADMIN — ALBUTEROL SULFATE 5 MG: 2.5 SOLUTION RESPIRATORY (INHALATION) at 19:00

## 2024-07-04 RX ADMIN — IOPAMIDOL 100 ML: 755 INJECTION, SOLUTION INTRAVENOUS at 20:26

## 2024-07-04 RX ADMIN — WATER 60 MG: 1 INJECTION INTRAMUSCULAR; INTRAVENOUS; SUBCUTANEOUS at 20:42

## 2024-07-04 ASSESSMENT — PAIN - FUNCTIONAL ASSESSMENT: PAIN_FUNCTIONAL_ASSESSMENT: 0-10

## 2024-07-04 ASSESSMENT — PAIN SCALES - GENERAL: PAINLEVEL_OUTOF10: 0

## 2024-07-04 NOTE — ED NOTES
Onset of wheezing last night.  Recent Dx of lung CA. Biopsy of lung and lymph nodes done. States has been coughing. Two episodes with small amt of hemoptysis that was told could occur after biopsy. Also noted some yellow sputum.  Tightness to anterior chest.  Pain to lateral ribs and around chest with coughing.  More difficulty when tries to lay down.  Denies NVD.

## 2024-07-04 NOTE — ED TRIAGE NOTES
Pt c/I SOB that started yesterday. Pt had a needle biopsy of her lungs last week, and had another biopsy on her lymph nodes on Tuesday. States it has been confirmed that she has lung cancer. Pt denies having any pain. States she started wheezing last night

## 2024-07-05 LAB
ACID FAST STN SPEC: NEGATIVE
EKG ATRIAL RATE: 77 BPM
EKG DIAGNOSIS: NORMAL
EKG P AXIS: 67 DEGREES
EKG P-R INTERVAL: 134 MS
EKG Q-T INTERVAL: 370 MS
EKG QRS DURATION: 80 MS
EKG QTC CALCULATION (BAZETT): 418 MS
EKG R AXIS: 46 DEGREES
EKG T AXIS: 52 DEGREES
EKG VENTRICULAR RATE: 77 BPM
MYCOBACTERIUM SPEC QL CULT: NORMAL
SPECIMEN PREPARATION: NORMAL
SPECIMEN SOURCE: NORMAL

## 2024-07-05 PROCEDURE — 93010 ELECTROCARDIOGRAM REPORT: CPT | Performed by: INTERNAL MEDICINE

## 2024-07-05 NOTE — ED NOTES
Treatment completed.  LS with expiratory wheezes throughout.  Improved air movement. Pulse ox 98% on room air

## 2024-07-05 NOTE — ED PROVIDER NOTES
7:00 pm :Pt care assumed from Dr. Anderson , ED provider. Pt complaint(s), current treatment plan, progression and available diagnostic results have been discussed thoroughly. The patient was seen and evaluated on his shift and endorsed to me.   Intended Disposition: D/C home    Pending diagnostic reports and/or labs (please list): CTA chest     Hospital course: Patient CT scan was negative for PE.  CT scan showed a positive nodule in the lung that was diagnosed a week ago.  Patient knows about the nodule and an oncologist will be treating her for possible lung cancer.    Hospital course: Patient was treated with albuterol nebulization treatment, Zithromax and prednisone.  Throughout her evaluation and treatment patient's symptom resolved.    Reassessed patient at 10:00 PM.  Patient asymptomatic.    Diagnosis: Asthmatic bronchitis, exacerbation COPD    Disposition: DC home.  Follow-up PCP in 2 to 3 days.  Return ER needed    Discharge medication: Zithromax, albuterol nebulization ampules, prednisone.     Cam Blair MD  07/05/24 0600    
- P [phone] - F [phone]  [address] [facility] [address] [address]      Phone: [phone]   albuterol 2.5 MG/0.5ML Nebu nebulizer solution  albuterol sulfate  (90 Base) MCG/ACT inhaler  azithromycin 250 MG tablet  predniSONE 20 MG tablet  predniSONE 50 MG tablet       These medications were sent to [id number] - [place], [place] - [address] [address] [facility] [address] - P [phone] - F [phone] [address] [facility] [address]      Phone: [phone]   albuterol (5 MG/ML) 0.5% nebulizer solution  albuterol sulfate  (90 Base) MCG/ACT inhaler  azithromycin 250 MG tablet  predniSONE 50 MG tablet           DISCONTINUED MEDICATIONS:  Discharge Medication List as of 7/4/2024 10:19 PM          I am the Primary Clinician of Record. Aleksander Anderson MD (electronically signed)    (Please note that parts of this dictation were completed with voice recognition software. Quite often unanticipated grammatical, syntax, homophones, and other interpretive errors are inadvertently transcribed by the computer software. Please disregards these errors. Please excuse any errors that have escaped final proofreading.)     Aleksander Anderson MD  07/05/24 0759

## 2024-07-05 NOTE — ED NOTES
Up ambulatory to bathroom. Dyspnea with exertion.  Heartrate and respiratory rate recover within 2 minutes back to bed. Pulse ox maintained at 98% on room air

## 2024-07-07 LAB
P JIROVECII DNA # BAL NAA+PROBE: NEGATIVE COPIES/ML
P JIROVECII DNA # BAL NAA+PROBE: NEGATIVE COPIES/ML
SOURCE: NORMAL
SOURCE: NORMAL
SPECIMEN SOURCE: NORMAL
SPECIMEN SOURCE: NORMAL

## 2024-07-08 LAB
BACTERIA SPEC CULT: NORMAL
SERVICE CMNT-IMP: NORMAL

## 2024-07-12 ENCOUNTER — TELEPHONE (OUTPATIENT)
Age: 74
End: 2024-07-12

## 2024-07-12 LAB
BACTERIA SPEC CULT: ABNORMAL
SERVICE CMNT-IMP: ABNORMAL

## 2024-07-15 LAB
BACTERIA SPEC CULT: NORMAL
SERVICE CMNT-IMP: NORMAL
SERVICE CMNT-IMP: NORMAL

## 2024-07-24 ENCOUNTER — OFFICE VISIT (OUTPATIENT)
Dept: CARDIOTHORACIC SURGERY | Age: 74
End: 2024-07-24
Payer: MEDICARE

## 2024-07-24 VITALS
HEIGHT: 65 IN | BODY MASS INDEX: 35.29 KG/M2 | DIASTOLIC BLOOD PRESSURE: 96 MMHG | SYSTOLIC BLOOD PRESSURE: 148 MMHG | HEART RATE: 88 BPM | OXYGEN SATURATION: 96 % | WEIGHT: 211.8 LBS

## 2024-07-24 DIAGNOSIS — C34.91 SQUAMOUS CELL CARCINOMA OF RIGHT LUNG (HCC): Primary | ICD-10-CM

## 2024-07-24 PROCEDURE — 99204 OFFICE O/P NEW MOD 45 MIN: CPT | Performed by: THORACIC SURGERY (CARDIOTHORACIC VASCULAR SURGERY)

## 2024-07-24 PROCEDURE — 1090F PRES/ABSN URINE INCON ASSESS: CPT | Performed by: THORACIC SURGERY (CARDIOTHORACIC VASCULAR SURGERY)

## 2024-07-24 PROCEDURE — G8417 CALC BMI ABV UP PARAM F/U: HCPCS | Performed by: THORACIC SURGERY (CARDIOTHORACIC VASCULAR SURGERY)

## 2024-07-24 PROCEDURE — G8427 DOCREV CUR MEDS BY ELIG CLIN: HCPCS | Performed by: THORACIC SURGERY (CARDIOTHORACIC VASCULAR SURGERY)

## 2024-07-24 NOTE — PROGRESS NOTES
Felicity Oquendo (:  1950) is a 74 y.o. female,New patient, here for evaluation of the following chief complaint(s):  New Patient (Patient referred by Dr KING Otero for Lung cancer)      Assessment & Plan   Patient has non-small cell lung cancer.  Patient has a large PET avid lymph nodes but underwent EBUS was negative for metastatic disease.  MRI of the brain is suspicious for possible right occipital metastatic disease.  I explained the findings to the patient and treatment options including surgery and chemoradiation.  I explained that surgery provides the best chances for cure for early stage lung cancer.  Patient had an MRI shows a mass in the right occipital area and could represent metastatic disease.  Will repeat the MRI of the brain with high-definition to assess whether this mass represents metastatic disease or not.  Patient pulmonary function tests were marginal for lobectomy.  Patient underwent 6-minute walk test with oxygen saturation 98% on room air at the end of the test with good effort.  Will obtain echocardiogram to assess for the shortness of breath.  Once we get all the studies the patient canceled previous year of stage we will proceed with surgical resection.  I explained above plan to the patient and her family in detail.  No follow-ups on file.       Subjective   HPI  74 years old female was found to have a right upper lobe lung mass on the CT scan.  Patient has remote history of smoking.  PET CT scan showed hypermetabolic activity in the mass as well as lymph nodes in the mediastinum.  CT-guided biopsy showed adenocarcinoma.  EBUS of the lymph nodes was negative for metastatic disease.  An MRI of the brain showed a right occipital lesion, however cannot be distinguished whether it is infarction or cerebral mass.  Pulmonary pulmonary function test shows moderate obstruction and restrictive disease.  Patient admits to shortness of breath with activity and partial loss of vision left

## 2024-07-29 LAB
BACTERIA SPEC CULT: NORMAL
SERVICE CMNT-IMP: NORMAL

## 2024-08-12 LAB
BACTERIA SPEC CULT: NORMAL
SERVICE CMNT-IMP: NORMAL

## 2024-08-16 LAB
ACID FAST STN SPEC: NEGATIVE
MYCOBACTERIUM SPEC QL CULT: NEGATIVE
SPECIMEN PREPARATION: NORMAL
SPECIMEN SOURCE: NORMAL

## 2024-08-21 ENCOUNTER — ANESTHESIA EVENT (OUTPATIENT)
Facility: HOSPITAL | Age: 74
End: 2024-08-21
Payer: MEDICARE

## 2024-08-21 VITALS — BODY MASS INDEX: 35.49 KG/M2 | WEIGHT: 213 LBS | HEIGHT: 65 IN

## 2024-08-21 RX ORDER — ALPRAZOLAM 0.5 MG
1 TABLET ORAL
COMMUNITY
Start: 2024-07-01

## 2024-08-21 NOTE — PERIOP NOTE
Instructions for your surgery at [facility]      Today's Date:  8/21/2024      Patient's Name:  Felicity Oquendo           Surgery Date:  8/22              Please enter the main entrance of the hospital and check-in at the front security desk located in the lobby. They will direct you to the area to report for your surgery.     Do NOT eat or drink anything, including candy, gum, or ice chips after midnight prior to your surgery, unless you have specific instructions from your surgeon or anesthesia provider to do so.  Brush your teeth before coming to the hospital. You may swish with water, but do not swallow.  No smoking/Vaping/E-Cigarettes 24 hours prior to the day of surgery.  No alcohol 24 hours prior to the day of surgery.  No recreational drugs for one week prior to the day of surgery.  Bring Photo ID, Insurance information, and Co-pay if required on day of surgery.  Bring in pertinent legal documents, such as, Medical Power of , DNR, Advance Directive, etc.  Leave all valuables, including money/purse, at home.  Remove all jewelry, including ALL body piercings, nail polish, acrylic nails, and makeup (including mascara); no lotions, powders, deodorant, or perfume/cologne/after shave on the skin.  Follow instruction for Hibiclens washes and CHG wipes from surgeon's office.   Glasses and dentures may be worn to the hospital. They must be removed prior to surgery. Please bring case/container for glasses or dentures.   Contact lenses should not be worn on day of surgery.   Call your doctor's office if symptoms of a cold or illness develop within 24-48 hours prior to your surgery.  Call your doctor's office if you have any questions concerning insurance or co-pays.  15. AN ADULT (relative or friend 18 years or older) MUST DRIVE YOU HOME AFTER YOUR SURGERY.  16. Please make arrangements for a responsible adult (18 years or older) to be with you for 24 hours after your surgery.   17. TWO

## 2024-08-22 ENCOUNTER — ANESTHESIA (OUTPATIENT)
Facility: HOSPITAL | Age: 74
End: 2024-08-22
Payer: MEDICARE

## 2024-08-22 ENCOUNTER — HOSPITAL ENCOUNTER (OUTPATIENT)
Facility: HOSPITAL | Age: 74
End: 2024-08-22
Attending: INTERNAL MEDICINE
Payer: MEDICARE

## 2024-08-22 ENCOUNTER — HOSPITAL ENCOUNTER (OUTPATIENT)
Facility: HOSPITAL | Age: 74
Discharge: HOME | End: 2024-08-22
Attending: INTERNAL MEDICINE | Admitting: RADIOLOGY
Payer: MEDICARE

## 2024-08-22 VITALS
TEMPERATURE: 98 F | DIASTOLIC BLOOD PRESSURE: 70 MMHG | SYSTOLIC BLOOD PRESSURE: 139 MMHG | RESPIRATION RATE: 19 BRPM | HEART RATE: 68 BPM | OXYGEN SATURATION: 99 %

## 2024-08-22 DIAGNOSIS — C34.11 MALIGNANT NEOPLASM OF UPPER LOBE, RIGHT BRONCHUS OR LUNG (HCC): ICD-10-CM

## 2024-08-22 DIAGNOSIS — C34.90 NON-SMALL CELL LUNG CANCER, UNSPECIFIED LATERALITY (HCC): ICD-10-CM

## 2024-08-22 LAB
ANION GAP BLD CALC-SCNC: ABNORMAL MMOL/L (ref 10–20)
BASE EXCESS BLD CALC-SCNC: 2 MMOL/L
CA-I BLD-MCNC: 1.24 MMOL/L (ref 1.12–1.32)
CHLORIDE BLD-SCNC: 106 MMOL/L (ref 98–107)
CO2 BLD-SCNC: 30 MMOL/L (ref 19–24)
CREAT BLD-MCNC: 0.81 MG/DL (ref 0.6–1.3)
GLUCOSE BLD-MCNC: 132 MG/DL (ref 70–110)
HCO3 BLD-SCNC: 29 MMOL/L (ref 22–26)
LACTATE BLD-SCNC: 1.28 MMOL/L (ref 0.4–2)
PCO2 BLDV: 54.5 MMHG (ref 41–51)
PH BLDV: 7.33 (ref 7.32–7.42)
PO2 BLDV: 25 MMHG (ref 25–40)
POTASSIUM BLD-SCNC: 4.5 MMOL/L (ref 3.5–5.1)
SAO2 % BLD: 40 %
SERVICE CMNT-IMP: ABNORMAL
SODIUM BLD-SCNC: 140 MMOL/L (ref 136–145)
SPECIMEN SITE: ABNORMAL

## 2024-08-22 PROCEDURE — 82330 ASSAY OF CALCIUM: CPT

## 2024-08-22 PROCEDURE — 2500000003 HC RX 250 WO HCPCS: Performed by: NURSE ANESTHETIST, CERTIFIED REGISTERED

## 2024-08-22 PROCEDURE — 70553 MRI BRAIN STEM W/O & W/DYE: CPT

## 2024-08-22 PROCEDURE — 2580000003 HC RX 258: Performed by: NURSE ANESTHETIST, CERTIFIED REGISTERED

## 2024-08-22 PROCEDURE — 6360000004 HC RX CONTRAST MEDICATION: Performed by: INTERNAL MEDICINE

## 2024-08-22 PROCEDURE — A9577 INJ MULTIHANCE: HCPCS | Performed by: INTERNAL MEDICINE

## 2024-08-22 PROCEDURE — 83605 ASSAY OF LACTIC ACID: CPT

## 2024-08-22 PROCEDURE — 3700000000 HC ANESTHESIA ATTENDED CARE

## 2024-08-22 PROCEDURE — 84295 ASSAY OF SERUM SODIUM: CPT

## 2024-08-22 PROCEDURE — 6360000002 HC RX W HCPCS: Performed by: NURSE ANESTHETIST, CERTIFIED REGISTERED

## 2024-08-22 PROCEDURE — 3700000001 HC ADD 15 MINUTES (ANESTHESIA)

## 2024-08-22 PROCEDURE — 82947 ASSAY GLUCOSE BLOOD QUANT: CPT

## 2024-08-22 PROCEDURE — 82803 BLOOD GASES ANY COMBINATION: CPT

## 2024-08-22 PROCEDURE — 85014 HEMATOCRIT: CPT

## 2024-08-22 PROCEDURE — 84132 ASSAY OF SERUM POTASSIUM: CPT

## 2024-08-22 RX ORDER — MAGNESIUM SULFATE 1 G/100ML
INJECTION INTRAVENOUS PRN
Status: DISCONTINUED | OUTPATIENT
Start: 2024-08-22 | End: 2024-08-22 | Stop reason: SDUPTHER

## 2024-08-22 RX ORDER — SODIUM CHLORIDE 0.9 % (FLUSH) 0.9 %
5-40 SYRINGE (ML) INJECTION PRN
OUTPATIENT
Start: 2024-08-22

## 2024-08-22 RX ORDER — GLYCOPYRROLATE 0.2 MG/ML
INJECTION INTRAMUSCULAR; INTRAVENOUS PRN
Status: DISCONTINUED | OUTPATIENT
Start: 2024-08-22 | End: 2024-08-22 | Stop reason: SDUPTHER

## 2024-08-22 RX ORDER — DEXMEDETOMIDINE HYDROCHLORIDE 100 UG/ML
INJECTION, SOLUTION INTRAVENOUS PRN
Status: DISCONTINUED | OUTPATIENT
Start: 2024-08-22 | End: 2024-08-22 | Stop reason: SDUPTHER

## 2024-08-22 RX ORDER — LIDOCAINE HYDROCHLORIDE 20 MG/ML
INJECTION, SOLUTION EPIDURAL; INFILTRATION; INTRACAUDAL; PERINEURAL PRN
Status: DISCONTINUED | OUTPATIENT
Start: 2024-08-22 | End: 2024-08-22 | Stop reason: SDUPTHER

## 2024-08-22 RX ORDER — SODIUM CHLORIDE 9 MG/ML
INJECTION, SOLUTION INTRAVENOUS PRN
OUTPATIENT
Start: 2024-08-22

## 2024-08-22 RX ORDER — EPHEDRINE SULFATE/0.9% NACL/PF 25 MG/5 ML
SYRINGE (ML) INTRAVENOUS PRN
Status: DISCONTINUED | OUTPATIENT
Start: 2024-08-22 | End: 2024-08-22 | Stop reason: SDUPTHER

## 2024-08-22 RX ORDER — SODIUM CHLORIDE 9 MG/ML
INJECTION, SOLUTION INTRAVENOUS CONTINUOUS PRN
Status: DISCONTINUED | OUTPATIENT
Start: 2024-08-22 | End: 2024-08-22 | Stop reason: SDUPTHER

## 2024-08-22 RX ORDER — PROPOFOL 10 MG/ML
INJECTION, EMULSION INTRAVENOUS PRN
Status: DISCONTINUED | OUTPATIENT
Start: 2024-08-22 | End: 2024-08-22 | Stop reason: SDUPTHER

## 2024-08-22 RX ORDER — NALOXONE HYDROCHLORIDE 0.4 MG/ML
INJECTION, SOLUTION INTRAMUSCULAR; INTRAVENOUS; SUBCUTANEOUS PRN
OUTPATIENT
Start: 2024-08-22

## 2024-08-22 RX ORDER — SODIUM CHLORIDE 0.9 % (FLUSH) 0.9 %
5-40 SYRINGE (ML) INJECTION EVERY 12 HOURS SCHEDULED
OUTPATIENT
Start: 2024-08-22

## 2024-08-22 RX ORDER — ONDANSETRON 2 MG/ML
4 INJECTION INTRAMUSCULAR; INTRAVENOUS
OUTPATIENT
Start: 2024-08-22 | End: 2024-08-23

## 2024-08-22 RX ADMIN — SODIUM CHLORIDE: 9 INJECTION, SOLUTION INTRAVENOUS at 07:59

## 2024-08-22 RX ADMIN — PROPOFOL 50 MG: 10 INJECTION, EMULSION INTRAVENOUS at 07:59

## 2024-08-22 RX ADMIN — EPHEDRINE SULFATE 10 MG: 5 INJECTION INTRAVENOUS at 08:15

## 2024-08-22 RX ADMIN — MAGNESIUM SULFATE HEPTAHYDRATE 1000 MG: 1 INJECTION, SOLUTION INTRAVENOUS at 07:59

## 2024-08-22 RX ADMIN — PROPOFOL 50 MG: 10 INJECTION, EMULSION INTRAVENOUS at 08:05

## 2024-08-22 RX ADMIN — DEXMEDETOMIDINE HYDROCHLORIDE 4 MCG: 100 INJECTION, SOLUTION INTRAVENOUS at 08:08

## 2024-08-22 RX ADMIN — GLYCOPYRROLATE 0.2 MG: 0.2 INJECTION INTRAMUSCULAR; INTRAVENOUS at 09:23

## 2024-08-22 RX ADMIN — PROPOFOL 100 MCG/KG/MIN: 10 INJECTION, EMULSION INTRAVENOUS at 08:01

## 2024-08-22 RX ADMIN — DEXMEDETOMIDINE HYDROCHLORIDE 12 MCG: 100 INJECTION, SOLUTION INTRAVENOUS at 09:24

## 2024-08-22 RX ADMIN — GADOBENATE DIMEGLUMINE 20 ML: 529 INJECTION, SOLUTION INTRAVENOUS at 09:56

## 2024-08-22 RX ADMIN — LIDOCAINE HYDROCHLORIDE 100 MG: 20 INJECTION, SOLUTION EPIDURAL; INFILTRATION; INTRACAUDAL; PERINEURAL at 07:59

## 2024-08-22 RX ADMIN — DEXMEDETOMIDINE HYDROCHLORIDE 6 MCG: 100 INJECTION, SOLUTION INTRAVENOUS at 07:59

## 2024-08-22 NOTE — ANESTHESIA PRE PROCEDURE
oz)   07/04/24 101.2 kg (223 lb)     There is no height or weight on file to calculate BMI.    CBC:   Lab Results   Component Value Date/Time    WBC 8.5 07/04/2024 05:30 PM    RBC 4.07 07/04/2024 05:30 PM    HGB 10.8 07/04/2024 05:30 PM    HCT 34.2 07/04/2024 05:30 PM    MCV 84.0 07/04/2024 05:30 PM    RDW 13.8 07/04/2024 05:30 PM     07/04/2024 05:30 PM       CMP:   Lab Results   Component Value Date/Time     07/04/2024 05:30 PM    K 4.0 07/04/2024 05:30 PM     07/04/2024 05:30 PM    CO2 27 07/04/2024 05:30 PM    BUN 11 07/04/2024 05:30 PM    CREATININE 0.81 08/22/2024 07:19 AM    CREATININE 0.98 07/04/2024 05:30 PM    LABGLOM 61 07/04/2024 05:30 PM    GLUCOSE 109 07/04/2024 05:30 PM    CALCIUM 8.9 07/04/2024 05:30 PM    BILITOT 0.2 07/04/2024 05:30 PM    ALKPHOS 69 07/04/2024 05:30 PM    AST 15 07/04/2024 05:30 PM    ALT 21 07/04/2024 05:30 PM       POC Tests:   Recent Labs     08/22/24  0719   POCGLU 132*   POCNA 140   POCK 4.5   POCCL 106       Coags:   Lab Results   Component Value Date/Time    PROTIME 12.2 06/24/2024 07:38 AM    INR 0.9 06/24/2024 07:38 AM    APTT 20.9 06/24/2024 07:38 AM       HCG (If Applicable): No results found for: \"PREGTESTUR\", \"PREGSERUM\", \"HCG\", \"HCGQUANT\"     ABGs: No results found for: \"PHART\", \"PO2ART\", \"YCS3GHV\", \"SGW7RTO\", \"BEART\", \"M2SNEOBX\"     Type & Screen (If Applicable):  No results found for: \"LABABO\"    Drug/Infectious Status (If Applicable):  No results found for: \"HIV\", \"HEPCAB\"    COVID-19 Screening (If Applicable): No results found for: \"COVID19\"        Anesthesia Evaluation  Patient summary reviewed  Airway: Mallampati: II     Neck ROM: limited  Mouth opening: > = 3 FB   Dental:    (+) poor dentition      Pulmonary:   (+)  COPD:      decreased breath sounds: bilateral                               Cardiovascular:    (+) hypertension:        Rhythm: regular  Rate: normal                    Neuro/Psych:               GI/Hepatic/Renal:   (+) GERD:

## 2024-08-22 NOTE — ANESTHESIA POSTPROCEDURE EVALUATION
Department of Anesthesiology  Postprocedure Note    Patient: Felicity Oquendo  MRN: [id number]  YOB: 1950  Date of evaluation: 8/22/2024    Procedure Summary       Date: 08/22/24 Room / Location: [facility] MRI; [facility] CARDIAC CATH LAB    Anesthesia Start: 0758 Anesthesia Stop: 0945    Procedure: MRI BRAIN W WO CONTRAST Diagnosis:       Malignant neoplasm of upper lobe, right bronchus or lung (HCC)      Non-small cell lung cancer, unspecified laterality (HCC)      Malignant neoplasm of upper lobe, right bronchus or lung (HCC)      Non-small cell lung cancer, unspecified laterality (HCC)    Scheduled Providers: Zeina Henley APRN - CRNA Responsible Provider: Dashawn Carlisle MD    Anesthesia Type: MAC ASA Status: 3            Anesthesia Type: MAC    Michelle Phase I:      Michelle Phase II:      Anesthesia Post Evaluation    Patient location during evaluation: bedside  Patient participation: complete - patient participated  Airway patency: patent  Cardiovascular status: hemodynamically stable  Respiratory status: acceptable  Hydration status: stable    No notable events documented.

## 2024-08-22 NOTE — PROGRESS NOTES
AVS Discharge instructions reviewed with patient and copy given to patient.  All questions answered, including when to resume medications.  Patient verbalized understanding to all discharge instructions.  PIV removed. Procedural site within normal limits.  No hematoma or bleeding noted from procedural and PIV site. No pain noted at discharge. Patient back to neurological baseline, alert and oriented times 4. Patient discharged in the presence of a responsible adult (NSR  ) who will accompany patient home and is able to report post procedure complications.

## 2024-08-26 ENCOUNTER — NURSE ONLY (OUTPATIENT)
Dept: CARDIOTHORACIC SURGERY | Age: 74
End: 2024-08-26

## 2024-08-26 ENCOUNTER — HOSPITAL ENCOUNTER (OUTPATIENT)
Facility: HOSPITAL | Age: 74
Discharge: HOME | End: 2024-08-29
Payer: MEDICARE

## 2024-08-26 ENCOUNTER — OFFICE VISIT (OUTPATIENT)
Dept: CARDIOTHORACIC SURGERY | Age: 74
End: 2024-08-26
Payer: MEDICARE

## 2024-08-26 VITALS
BODY MASS INDEX: 35.85 KG/M2 | OXYGEN SATURATION: 97 % | HEART RATE: 71 BPM | SYSTOLIC BLOOD PRESSURE: 138 MMHG | WEIGHT: 215.2 LBS | DIASTOLIC BLOOD PRESSURE: 64 MMHG | HEIGHT: 65 IN

## 2024-08-26 DIAGNOSIS — C34.91 SQUAMOUS CELL CARCINOMA OF RIGHT LUNG (HCC): Primary | ICD-10-CM

## 2024-08-26 DIAGNOSIS — R91.1 LUNG NODULE: ICD-10-CM

## 2024-08-26 DIAGNOSIS — R91.1 LUNG NODULE: Primary | ICD-10-CM

## 2024-08-26 LAB
ALBUMIN SERPL-MCNC: 3.8 G/DL (ref 3.4–5)
ALBUMIN/GLOB SERPL: 1 (ref 0.8–1.7)
ALP SERPL-CCNC: 82 U/L (ref 45–117)
ALT SERPL-CCNC: 21 U/L (ref 13–56)
ANION GAP BLD CALC-SCNC: ABNORMAL MMOL/L (ref 10–20)
ANION GAP SERPL CALC-SCNC: 7 MMOL/L (ref 3–18)
ARTERIAL PATENCY WRIST A: POSITIVE
AST SERPL-CCNC: 17 U/L (ref 10–38)
BASE EXCESS BLD CALC-SCNC: 1 MMOL/L
BASOPHILS # BLD: 0 K/UL (ref 0–0.1)
BASOPHILS NFR BLD: 1 % (ref 0–2)
BDY SITE: ABNORMAL
BILIRUB SERPL-MCNC: 0.4 MG/DL (ref 0.2–1)
BUN SERPL-MCNC: 13 MG/DL (ref 7–18)
BUN/CREAT SERPL: 14 (ref 12–20)
CA-I BLD-MCNC: 1.22 MMOL/L (ref 1.12–1.32)
CALCIUM SERPL-MCNC: 9.4 MG/DL (ref 8.5–10.1)
CHLORIDE BLD-SCNC: 104 MMOL/L (ref 98–107)
CHLORIDE SERPL-SCNC: 102 MMOL/L (ref 100–111)
CO2 BLD-SCNC: 25 MMOL/L (ref 19–24)
CO2 SERPL-SCNC: 28 MMOL/L (ref 21–32)
CREAT BLD-MCNC: 0.82 MG/DL (ref 0.6–1.3)
CREAT SERPL-MCNC: 0.95 MG/DL (ref 0.6–1.3)
DIFFERENTIAL METHOD BLD: ABNORMAL
EOSINOPHIL # BLD: 0.1 K/UL (ref 0–0.4)
EOSINOPHIL NFR BLD: 2 % (ref 0–5)
ERYTHROCYTE [DISTWIDTH] IN BLOOD BY AUTOMATED COUNT: 13.5 % (ref 11.6–14.5)
EST. AVERAGE GLUCOSE BLD GHB EST-MCNC: 143 MG/DL
GAS FLOW.O2 O2 DELIVERY SYS: ABNORMAL
GLOBULIN SER CALC-MCNC: 3.9 G/DL (ref 2–4)
GLUCOSE BLD-MCNC: 108 MG/DL (ref 70–110)
GLUCOSE SERPL-MCNC: 96 MG/DL (ref 74–99)
HBA1C MFR BLD: 6.6 % (ref 4.2–5.6)
HCO3 BLD-SCNC: 25.1 MMOL/L (ref 22–26)
HCT VFR BLD AUTO: 42.1 % (ref 35–45)
HGB BLD-MCNC: 13.1 G/DL (ref 12–16)
IMM GRANULOCYTES # BLD AUTO: 0 K/UL (ref 0–0.04)
IMM GRANULOCYTES NFR BLD AUTO: 0 % (ref 0–0.5)
INR PPP: 1 (ref 0.9–1.1)
LACTATE BLD-SCNC: 0.78 MMOL/L (ref 0.4–2)
LYMPHOCYTES # BLD: 2.1 K/UL (ref 0.9–3.6)
LYMPHOCYTES NFR BLD: 29 % (ref 21–52)
MCH RBC QN AUTO: 26.3 PG (ref 24–34)
MCHC RBC AUTO-ENTMCNC: 31.1 G/DL (ref 31–37)
MCV RBC AUTO: 84.4 FL (ref 78–100)
MONOCYTES # BLD: 0.5 K/UL (ref 0.05–1.2)
MONOCYTES NFR BLD: 7 % (ref 3–10)
NEUTS SEG # BLD: 4.4 K/UL (ref 1.8–8)
NEUTS SEG NFR BLD: 62 % (ref 40–73)
NRBC # BLD: 0 K/UL (ref 0–0.01)
NRBC BLD-RTO: 0 PER 100 WBC
PCO2 BLD: 37.5 MMHG (ref 35–45)
PH BLD: 7.43 (ref 7.35–7.45)
PLATELET # BLD AUTO: 335 K/UL (ref 135–420)
PMV BLD AUTO: 9.1 FL (ref 9.2–11.8)
PO2 BLD: 77 MMHG (ref 80–100)
POTASSIUM BLD-SCNC: 4.1 MMOL/L (ref 3.5–5.1)
POTASSIUM SERPL-SCNC: 4.4 MMOL/L (ref 3.5–5.5)
PROT SERPL-MCNC: 7.7 G/DL (ref 6.4–8.2)
PROTHROMBIN TIME: 13 SEC (ref 11.9–14.9)
RBC # BLD AUTO: 4.99 M/UL (ref 4.2–5.3)
SAO2 % BLD: 96 %
SERVICE CMNT-IMP: ABNORMAL
SODIUM BLD-SCNC: 138 MMOL/L (ref 136–145)
SODIUM SERPL-SCNC: 137 MMOL/L (ref 136–145)
SPECIMEN SITE: ABNORMAL
T4 FREE SERPL-MCNC: 0.8 NG/DL (ref 0.7–1.5)
TSH SERPL DL<=0.05 MIU/L-ACNC: 0.17 UIU/ML (ref 0.36–3.74)
WBC # BLD AUTO: 7.1 K/UL (ref 4.6–13.2)

## 2024-08-26 PROCEDURE — G8417 CALC BMI ABV UP PARAM F/U: HCPCS | Performed by: THORACIC SURGERY (CARDIOTHORACIC VASCULAR SURGERY)

## 2024-08-26 PROCEDURE — 3017F COLORECTAL CA SCREEN DOC REV: CPT | Performed by: THORACIC SURGERY (CARDIOTHORACIC VASCULAR SURGERY)

## 2024-08-26 PROCEDURE — 85025 COMPLETE CBC W/AUTO DIFF WBC: CPT

## 2024-08-26 PROCEDURE — 86901 BLOOD TYPING SEROLOGIC RH(D): CPT

## 2024-08-26 PROCEDURE — 36415 COLL VENOUS BLD VENIPUNCTURE: CPT

## 2024-08-26 PROCEDURE — 80053 COMPREHEN METABOLIC PANEL: CPT

## 2024-08-26 PROCEDURE — 86850 RBC ANTIBODY SCREEN: CPT

## 2024-08-26 PROCEDURE — G8400 PT W/DXA NO RESULTS DOC: HCPCS | Performed by: THORACIC SURGERY (CARDIOTHORACIC VASCULAR SURGERY)

## 2024-08-26 PROCEDURE — 84132 ASSAY OF SERUM POTASSIUM: CPT

## 2024-08-26 PROCEDURE — 99215 OFFICE O/P EST HI 40 MIN: CPT | Performed by: THORACIC SURGERY (CARDIOTHORACIC VASCULAR SURGERY)

## 2024-08-26 PROCEDURE — 1036F TOBACCO NON-USER: CPT | Performed by: THORACIC SURGERY (CARDIOTHORACIC VASCULAR SURGERY)

## 2024-08-26 PROCEDURE — 83605 ASSAY OF LACTIC ACID: CPT

## 2024-08-26 PROCEDURE — 85014 HEMATOCRIT: CPT

## 2024-08-26 PROCEDURE — 82803 BLOOD GASES ANY COMBINATION: CPT

## 2024-08-26 PROCEDURE — 1123F ACP DISCUSS/DSCN MKR DOCD: CPT | Performed by: THORACIC SURGERY (CARDIOTHORACIC VASCULAR SURGERY)

## 2024-08-26 PROCEDURE — 1090F PRES/ABSN URINE INCON ASSESS: CPT | Performed by: THORACIC SURGERY (CARDIOTHORACIC VASCULAR SURGERY)

## 2024-08-26 PROCEDURE — 82330 ASSAY OF CALCIUM: CPT

## 2024-08-26 PROCEDURE — 84439 ASSAY OF FREE THYROXINE: CPT

## 2024-08-26 PROCEDURE — G8427 DOCREV CUR MEDS BY ELIG CLIN: HCPCS | Performed by: THORACIC SURGERY (CARDIOTHORACIC VASCULAR SURGERY)

## 2024-08-26 PROCEDURE — 84443 ASSAY THYROID STIM HORMONE: CPT

## 2024-08-26 PROCEDURE — 82947 ASSAY GLUCOSE BLOOD QUANT: CPT

## 2024-08-26 PROCEDURE — 84295 ASSAY OF SERUM SODIUM: CPT

## 2024-08-26 PROCEDURE — 86900 BLOOD TYPING SEROLOGIC ABO: CPT

## 2024-08-26 PROCEDURE — 83036 HEMOGLOBIN GLYCOSYLATED A1C: CPT

## 2024-08-26 PROCEDURE — 85610 PROTHROMBIN TIME: CPT

## 2024-08-26 PROCEDURE — 86923 COMPATIBILITY TEST ELECTRIC: CPT

## 2024-08-26 NOTE — PROGRESS NOTES
not taking: Reported on 2024)       No current facility-administered medications on file prior to visit.     Social History     Socioeconomic History    Marital status:      Spouse name: Not on file    Number of children: Not on file    Years of education: Not on file    Highest education level: Not on file   Occupational History    Not on file   Tobacco Use    Smoking status: Former     Current packs/day: 0.00     Average packs/day: 1 pack/day for 30.0 years (30.0 ttl pk-yrs)     Types: Cigarettes     Start date:      Quit date: 2004     Years since quittin.2     Passive exposure: Past    Smokeless tobacco: Never   Vaping Use    Vaping status: Never Used   Substance and Sexual Activity    Alcohol use: Yes     Comment: [place].    Drug use: Never    Sexual activity: Not on file   Other Topics Concern    Not on file   Social History Narrative    Not on file     Social Determinants of Health     Financial Resource Strain: Not on file   Food Insecurity: Not on file   Transportation Needs: Not on file   Physical Activity: Not on file   Stress: Not on file   Social Connections: Not on file   Intimate Partner Violence: Not on file   Housing Stability: Not on file     Family History   Problem Relation Age of Onset    Breast Cancer Sister     Cancer Brother     COPD Brother      Review of systems:  Constitutional: Denies any fevers, chills, night sweats, weight gain, weight loss.  Eyes: Denies any blurred vision, double vision, tunnel vision.  Ear nose throat: Denies any sore throat, swallowing difficulty, postnasal drip, sinusitis, epistaxis.  Respiratory: Denies any cough, sputum production.  She does have some wheezing and uses inhalers.  She has never been on home oxygen.  She does carry a diagnosis of COPD.  She does not have any hospitalization due to bronchitis.  She denies any hemoptysis or tuberculosis.  She does have shortness of breath with exertion when climbing a flight of  which might require blood transfusion and/or reexploration in the operating room, prolonged air leak from the lung which might require long-term chest tube drainage and/or discharge home with a chest tube, pulm postthoracotomy pain, recurrence of the cancer in the same lung or in the other lung in the future, complications of anesthesia including but not limited to cardiac, renal, pulmonary, any other complication and general anesthesia was discussed with the patient and family in great detail.  They understand and they are ready to proceed.  I answered all of their questions to their satisfaction.  Thank you much for the opportunity to be involved in this patient's care.

## 2024-08-26 NOTE — PROGRESS NOTES
[facility]  [address]  [place] [address]    Thoracic Surgery Pre-operative Instructions    Patient Name: Felicity Oquendo    YOB: 1950    Procedure Date and Time: 8/29/2024 @ 7:30 am    Check In Time: 5:30 am at the main entrance of [facility]. (Usually 2 hours prior to procedure)      1.  Complete your three showers using Hibiclens surgical soap on 8/26 night, 8/27 night and 8/28 night. Be sure to use a clean washcloth each time. Complete your normal shower and then a clean washcloth that is different from your normal showering washcloth and rub in Hibiclens for about 5 minutes from your neck down and then rinse off.    2.   Nothing to eat or drink after midnight on 8/28/2024.    3.  Take your All medication with a small sip of water on the morning of surgery. Except Metformin and Januvia ( take 1/2 of your normal dose the day before surgery)    4. Begin practicing the incentive spirometer. At least ten times four times a day.    How Do I Use an Incentive Spirometer?  Your health care provider has set a marker for you. Your goal is to keep the piston (the blue disc in the main chamber in the picture above) at your marker as long as possible with each breath.  Sit up straight and tall, and hold the spirometer in your hands.  Take a deep breath in and let it out.  Place the mouthpiece in your mouth. Make sure your lips completely cover the mouthpiece.  Breathe in slowly through the mouthpiece (like sucking through a straw).  Keep the range indicator (little marker on the side chamber) in the target zone.  Breathe in until the piston gets to your tripp.  Hold your breath in for 3 seconds and then let it out.  Repeat as prescribed, typically about 10 breaths every hour.  Before you know it, you'll be a pro!       5. Complete your pre-op labs on 8/26/2024. Go to patient registration at the main entrance of [facility].    6.   Please bring all your medication bottles to the hospital on the morning of surgery.    7.  If there are changes in medical condition, such as cold, fever or infection, please let your doctor know as soon as possible.    8.  If you have a change in medication, please let your doctor know as soon as possible.    9.  If you have any questions or concerns prior to your surgery, please call the main number at [phone] and speak with a nurse.

## 2024-08-26 NOTE — PROGRESS NOTES
Felicity Oquendo presents today for   Chief Complaint   Patient presents with    New Patient     New to provider follow up MRI       Felicity W Azra preferred language for health care discussion is english/other.    Is someone accompanying this pt? Yes    Is the patient using any DME equipment during OV? No    Pt currently taking Anticoagulant therapy? No    Coordination of Care:  1. Have you been to the ER, urgent care clinic since your last visit? Hospitalized since your last visit? No    2. Have you seen or consulted any other health care providers outside of the [facility] since your last visit? Include any pap smears or colon screening. Yes

## 2024-08-26 NOTE — PROGRESS NOTES
Spent approximately 10 minutes with patient reviewing preoperative instructions and teaching.  Patient completed patient history form for blood bank. Reviewed preoperative instructions and dispensed Hibiclens. Provided patient with incentive spirometer and instructed on use. Provided educational handouts for both surgical site and bloodstream infection and prevention. Reiterated to patient to go to patient registration at the main entrance of [facility] for pre-operative testing and on the morning of surgery. All questions answered and patient will call if anything arises.

## 2024-08-27 RX ORDER — CHLORHEXIDINE GLUCONATE 40 MG/ML
SOLUTION TOPICAL DAILY PRN
Qty: 3 EACH | Refills: 0 | Status: ON HOLD | COMMUNITY
Start: 2024-08-27 | End: 2024-08-30

## 2024-08-28 ENCOUNTER — PREP FOR PROCEDURE (OUTPATIENT)
Dept: CARDIOTHORACIC SURGERY | Age: 74
End: 2024-08-28

## 2024-08-28 ENCOUNTER — ANESTHESIA EVENT (OUTPATIENT)
Dept: CARDIOTHORACIC SURGERY | Facility: HOSPITAL | Age: 74
End: 2024-08-28
Payer: MEDICARE

## 2024-08-28 ENCOUNTER — TELEPHONE (OUTPATIENT)
Dept: CARDIOTHORACIC SURGERY | Age: 74
End: 2024-08-28

## 2024-08-28 DIAGNOSIS — C34.91 MALIGNANT NEOPLASM OF BRONCHUS, RIGHT (HCC): ICD-10-CM

## 2024-08-28 NOTE — TELEPHONE ENCOUNTER
Called patient to inform her she is scheduled and ready for surgery on 8/28/24. Patient verbalized understanding

## 2024-08-29 ENCOUNTER — ANESTHESIA (OUTPATIENT)
Dept: CARDIOTHORACIC SURGERY | Facility: HOSPITAL | Age: 74
End: 2024-08-29
Payer: MEDICARE

## 2024-08-29 ENCOUNTER — APPOINTMENT (OUTPATIENT)
Facility: HOSPITAL | Age: 74
DRG: 165 | End: 2024-08-29
Attending: THORACIC SURGERY (CARDIOTHORACIC VASCULAR SURGERY)
Payer: MEDICARE

## 2024-08-29 ENCOUNTER — HOSPITAL ENCOUNTER (INPATIENT)
Facility: HOSPITAL | Age: 74
LOS: 5 days | Discharge: HOME HEALTH | DRG: 165 | End: 2024-09-03
Attending: THORACIC SURGERY (CARDIOTHORACIC VASCULAR SURGERY) | Admitting: THORACIC SURGERY (CARDIOTHORACIC VASCULAR SURGERY)
Payer: MEDICARE

## 2024-08-29 DIAGNOSIS — C34.91 SQUAMOUS CELL CARCINOMA OF RIGHT LUNG (HCC): Primary | ICD-10-CM

## 2024-08-29 PROBLEM — R91.8 MASS OF UPPER LOBE OF RIGHT LUNG: Status: ACTIVE | Noted: 2024-08-29

## 2024-08-29 LAB
ABO + RH BLD: NORMAL
BASE DEFICIT BLD-SCNC: 3.8 MMOL/L
BLD PROD TYP BPU: NORMAL
BLOOD BANK DISPENSE STATUS: NORMAL
BLOOD GROUP ANTIBODIES SERPL: NORMAL
BPU ID: NORMAL
CROSSMATCH RESULT: NORMAL
GLUCOSE BLD STRIP.AUTO-MCNC: 120 MG/DL (ref 70–110)
HCO3 BLD-SCNC: 22 MMOL/L (ref 22–26)
HISTORY CHECK: NORMAL
PCO2 BLD: 41.9 MMHG (ref 35–45)
PH BLD: 7.33 (ref 7.35–7.45)
PO2 BLD: 110 MMHG (ref 80–100)
SAO2 % BLD: 97.9 % (ref 92–97)
SERVICE CMNT-IMP: ABNORMAL
SPECIMEN EXP DATE BLD: NORMAL
SPECIMEN TYPE: ABNORMAL
UNIT DIVISION: 0

## 2024-08-29 PROCEDURE — 6360000002 HC RX W HCPCS: Performed by: PHYSICIAN ASSISTANT

## 2024-08-29 PROCEDURE — 38746 REMOVE THORACIC LYMPH NODES: CPT | Performed by: THORACIC SURGERY (CARDIOTHORACIC VASCULAR SURGERY)

## 2024-08-29 PROCEDURE — C1729 CATH, DRAINAGE: HCPCS | Performed by: THORACIC SURGERY (CARDIOTHORACIC VASCULAR SURGERY)

## 2024-08-29 PROCEDURE — 3600000015 HC SURGERY LEVEL 5 ADDTL 15MIN: Performed by: THORACIC SURGERY (CARDIOTHORACIC VASCULAR SURGERY)

## 2024-08-29 PROCEDURE — 2580000003 HC RX 258: Performed by: THORACIC SURGERY (CARDIOTHORACIC VASCULAR SURGERY)

## 2024-08-29 PROCEDURE — C1713 ANCHOR/SCREW BN/BN,TIS/BN: HCPCS | Performed by: THORACIC SURGERY (CARDIOTHORACIC VASCULAR SURGERY)

## 2024-08-29 PROCEDURE — A4217 STERILE WATER/SALINE, 500 ML: HCPCS | Performed by: THORACIC SURGERY (CARDIOTHORACIC VASCULAR SURGERY)

## 2024-08-29 PROCEDURE — C2618 PROBE/NEEDLE, CRYO: HCPCS | Performed by: THORACIC SURGERY (CARDIOTHORACIC VASCULAR SURGERY)

## 2024-08-29 PROCEDURE — 6360000002 HC RX W HCPCS: Performed by: ANESTHESIOLOGY

## 2024-08-29 PROCEDURE — 6370000000 HC RX 637 (ALT 250 FOR IP): Performed by: PHYSICIAN ASSISTANT

## 2024-08-29 PROCEDURE — 88309 TISSUE EXAM BY PATHOLOGIST: CPT

## 2024-08-29 PROCEDURE — 82962 GLUCOSE BLOOD TEST: CPT

## 2024-08-29 PROCEDURE — C1894 INTRO/SHEATH, NON-LASER: HCPCS | Performed by: THORACIC SURGERY (CARDIOTHORACIC VASCULAR SURGERY)

## 2024-08-29 PROCEDURE — 6360000002 HC RX W HCPCS: Performed by: THORACIC SURGERY (CARDIOTHORACIC VASCULAR SURGERY)

## 2024-08-29 PROCEDURE — 2580000003 HC RX 258: Performed by: ANESTHESIOLOGY

## 2024-08-29 PROCEDURE — 2709999900 HC NON-CHARGEABLE SUPPLY: Performed by: THORACIC SURGERY (CARDIOTHORACIC VASCULAR SURGERY)

## 2024-08-29 PROCEDURE — 2500000003 HC RX 250 WO HCPCS: Performed by: PHYSICIAN ASSISTANT

## 2024-08-29 PROCEDURE — 2580000003 HC RX 258: Performed by: PHYSICIAN ASSISTANT

## 2024-08-29 PROCEDURE — 0BTC0ZZ RESECTION OF RIGHT UPPER LUNG LOBE, OPEN APPROACH: ICD-10-PCS | Performed by: THORACIC SURGERY (CARDIOTHORACIC VASCULAR SURGERY)

## 2024-08-29 PROCEDURE — 2500000003 HC RX 250 WO HCPCS: Performed by: ANESTHESIOLOGY

## 2024-08-29 PROCEDURE — 3700000001 HC ADD 15 MINUTES (ANESTHESIA): Performed by: THORACIC SURGERY (CARDIOTHORACIC VASCULAR SURGERY)

## 2024-08-29 PROCEDURE — 07T70ZZ RESECTION OF THORAX LYMPHATIC, OPEN APPROACH: ICD-10-PCS | Performed by: THORACIC SURGERY (CARDIOTHORACIC VASCULAR SURGERY)

## 2024-08-29 PROCEDURE — 2000000000 HC ICU R&B

## 2024-08-29 PROCEDURE — 94640 AIRWAY INHALATION TREATMENT: CPT

## 2024-08-29 PROCEDURE — 82803 BLOOD GASES ANY COMBINATION: CPT

## 2024-08-29 PROCEDURE — 71045 X-RAY EXAM CHEST 1 VIEW: CPT

## 2024-08-29 PROCEDURE — 88305 TISSUE EXAM BY PATHOLOGIST: CPT

## 2024-08-29 PROCEDURE — 32480 PARTIAL REMOVAL OF LUNG: CPT | Performed by: THORACIC SURGERY (CARDIOTHORACIC VASCULAR SURGERY)

## 2024-08-29 PROCEDURE — C9290 INJ, BUPIVACAINE LIPOSOME: HCPCS | Performed by: THORACIC SURGERY (CARDIOTHORACIC VASCULAR SURGERY)

## 2024-08-29 PROCEDURE — 88331 PATH CONSLTJ SURG 1 BLK 1SPC: CPT

## 2024-08-29 PROCEDURE — 3600000005 HC SURGERY LEVEL 5 BASE: Performed by: THORACIC SURGERY (CARDIOTHORACIC VASCULAR SURGERY)

## 2024-08-29 PROCEDURE — C1769 GUIDE WIRE: HCPCS | Performed by: THORACIC SURGERY (CARDIOTHORACIC VASCULAR SURGERY)

## 2024-08-29 PROCEDURE — 2720000010 HC SURG SUPPLY STERILE: Performed by: THORACIC SURGERY (CARDIOTHORACIC VASCULAR SURGERY)

## 2024-08-29 PROCEDURE — 3700000000 HC ANESTHESIA ATTENDED CARE: Performed by: THORACIC SURGERY (CARDIOTHORACIC VASCULAR SURGERY)

## 2024-08-29 PROCEDURE — C1751 CATH, INF, PER/CENT/MIDLINE: HCPCS | Performed by: THORACIC SURGERY (CARDIOTHORACIC VASCULAR SURGERY)

## 2024-08-29 PROCEDURE — 2700000000 HC OXYGEN THERAPY PER DAY

## 2024-08-29 RX ORDER — PROPOFOL 10 MG/ML
INJECTION, EMULSION INTRAVENOUS PRN
Status: DISCONTINUED | OUTPATIENT
Start: 2024-08-29 | End: 2024-08-29 | Stop reason: SDUPTHER

## 2024-08-29 RX ORDER — LIDOCAINE HYDROCHLORIDE 20 MG/ML
INJECTION, SOLUTION EPIDURAL; INFILTRATION; INTRACAUDAL; PERINEURAL PRN
Status: DISCONTINUED | OUTPATIENT
Start: 2024-08-29 | End: 2024-08-29 | Stop reason: SDUPTHER

## 2024-08-29 RX ORDER — FAMOTIDINE 20 MG/1
20 TABLET, FILM COATED ORAL 2 TIMES DAILY
Status: DISCONTINUED | OUTPATIENT
Start: 2024-08-29 | End: 2024-09-03 | Stop reason: HOSPADM

## 2024-08-29 RX ORDER — SODIUM CHLORIDE 0.9 % (FLUSH) 0.9 %
5-40 SYRINGE (ML) INJECTION PRN
Status: DISCONTINUED | OUTPATIENT
Start: 2024-08-29 | End: 2024-08-29 | Stop reason: HOSPADM

## 2024-08-29 RX ORDER — SODIUM CHLORIDE 9 MG/ML
INJECTION, SOLUTION INTRAVENOUS CONTINUOUS
Status: DISCONTINUED | OUTPATIENT
Start: 2024-08-29 | End: 2024-08-29 | Stop reason: HOSPADM

## 2024-08-29 RX ORDER — LIOTHYRONINE SODIUM 25 UG/1
25 TABLET ORAL DAILY
Status: DISCONTINUED | OUTPATIENT
Start: 2024-08-30 | End: 2024-09-03 | Stop reason: HOSPADM

## 2024-08-29 RX ORDER — ESCITALOPRAM OXALATE 10 MG/1
5 TABLET ORAL DAILY
Status: DISCONTINUED | OUTPATIENT
Start: 2024-08-29 | End: 2024-09-01

## 2024-08-29 RX ORDER — OXYCODONE HYDROCHLORIDE 10 MG/1
10 TABLET ORAL EVERY 4 HOURS PRN
Status: DISCONTINUED | OUTPATIENT
Start: 2024-08-29 | End: 2024-08-31

## 2024-08-29 RX ORDER — BUPIVACAINE HYDROCHLORIDE 2.5 MG/ML
INJECTION, SOLUTION EPIDURAL; INFILTRATION; INTRACAUDAL
Status: DISPENSED
Start: 2024-08-29 | End: 2024-08-29

## 2024-08-29 RX ORDER — LIDOCAINE 4 G/G
2 PATCH TOPICAL DAILY
Status: DISCONTINUED | OUTPATIENT
Start: 2024-08-29 | End: 2024-09-03 | Stop reason: HOSPADM

## 2024-08-29 RX ORDER — MORPHINE SULFATE 2 MG/ML
2 INJECTION, SOLUTION INTRAMUSCULAR; INTRAVENOUS
Status: DISCONTINUED | OUTPATIENT
Start: 2024-08-29 | End: 2024-08-30

## 2024-08-29 RX ORDER — SODIUM CHLORIDE, SODIUM LACTATE, POTASSIUM CHLORIDE, CALCIUM CHLORIDE 600; 310; 30; 20 MG/100ML; MG/100ML; MG/100ML; MG/100ML
INJECTION, SOLUTION INTRAVENOUS CONTINUOUS PRN
Status: DISCONTINUED | OUTPATIENT
Start: 2024-08-29 | End: 2024-08-29 | Stop reason: SDUPTHER

## 2024-08-29 RX ORDER — ONDANSETRON 2 MG/ML
4 INJECTION INTRAMUSCULAR; INTRAVENOUS EVERY 6 HOURS PRN
Status: DISCONTINUED | OUTPATIENT
Start: 2024-08-29 | End: 2024-09-03 | Stop reason: HOSPADM

## 2024-08-29 RX ORDER — HYDRALAZINE HYDROCHLORIDE 20 MG/ML
10 INJECTION INTRAMUSCULAR; INTRAVENOUS EVERY 6 HOURS PRN
Status: DISCONTINUED | OUTPATIENT
Start: 2024-08-29 | End: 2024-09-03 | Stop reason: HOSPADM

## 2024-08-29 RX ORDER — IPRATROPIUM BROMIDE AND ALBUTEROL SULFATE 2.5; .5 MG/3ML; MG/3ML
1 SOLUTION RESPIRATORY (INHALATION)
Status: DISCONTINUED | OUTPATIENT
Start: 2024-08-29 | End: 2024-08-29

## 2024-08-29 RX ORDER — MORPHINE SULFATE 4 MG/ML
4 INJECTION, SOLUTION INTRAMUSCULAR; INTRAVENOUS
Status: DISCONTINUED | OUTPATIENT
Start: 2024-08-29 | End: 2024-08-30

## 2024-08-29 RX ORDER — SODIUM CHLORIDE 0.9 % (FLUSH) 0.9 %
5-40 SYRINGE (ML) INJECTION PRN
Status: DISCONTINUED | OUTPATIENT
Start: 2024-08-29 | End: 2024-09-03 | Stop reason: HOSPADM

## 2024-08-29 RX ORDER — DEXTROSE MONOHYDRATE AND SODIUM CHLORIDE 5; .45 G/100ML; G/100ML
INJECTION, SOLUTION INTRAVENOUS CONTINUOUS
Status: DISPENSED | OUTPATIENT
Start: 2024-08-29 | End: 2024-08-30

## 2024-08-29 RX ORDER — POTASSIUM CHLORIDE 7.45 MG/ML
10 INJECTION INTRAVENOUS PRN
Status: DISCONTINUED | OUTPATIENT
Start: 2024-08-29 | End: 2024-09-03 | Stop reason: HOSPADM

## 2024-08-29 RX ORDER — METOPROLOL SUCCINATE 25 MG/1
25 TABLET, EXTENDED RELEASE ORAL DAILY
Status: DISCONTINUED | OUTPATIENT
Start: 2024-08-30 | End: 2024-09-03 | Stop reason: HOSPADM

## 2024-08-29 RX ORDER — POTASSIUM CHLORIDE 1500 MG/1
40 TABLET, EXTENDED RELEASE ORAL PRN
Status: DISCONTINUED | OUTPATIENT
Start: 2024-08-29 | End: 2024-09-03 | Stop reason: HOSPADM

## 2024-08-29 RX ORDER — SODIUM CHLORIDE 0.9 % (FLUSH) 0.9 %
5-40 SYRINGE (ML) INJECTION EVERY 12 HOURS SCHEDULED
Status: DISCONTINUED | OUTPATIENT
Start: 2024-08-29 | End: 2024-08-29 | Stop reason: HOSPADM

## 2024-08-29 RX ORDER — MAGNESIUM SULFATE IN WATER 40 MG/ML
2000 INJECTION, SOLUTION INTRAVENOUS PRN
Status: DISCONTINUED | OUTPATIENT
Start: 2024-08-29 | End: 2024-09-03 | Stop reason: HOSPADM

## 2024-08-29 RX ORDER — ALPRAZOLAM 0.25 MG
0.25 TABLET ORAL NIGHTLY PRN
Status: DISCONTINUED | OUTPATIENT
Start: 2024-08-29 | End: 2024-08-31

## 2024-08-29 RX ORDER — POLYETHYLENE GLYCOL 3350 17 G/17G
17 POWDER, FOR SOLUTION ORAL DAILY
Status: DISCONTINUED | OUTPATIENT
Start: 2024-08-29 | End: 2024-09-01

## 2024-08-29 RX ORDER — HEPARIN SODIUM 5000 [USP'U]/ML
5000 INJECTION, SOLUTION INTRAVENOUS; SUBCUTANEOUS EVERY 8 HOURS
Status: DISCONTINUED | OUTPATIENT
Start: 2024-08-29 | End: 2024-09-03 | Stop reason: HOSPADM

## 2024-08-29 RX ORDER — ONDANSETRON 2 MG/ML
INJECTION INTRAMUSCULAR; INTRAVENOUS PRN
Status: DISCONTINUED | OUTPATIENT
Start: 2024-08-29 | End: 2024-08-29 | Stop reason: SDUPTHER

## 2024-08-29 RX ORDER — BUPIVACAINE HYDROCHLORIDE 2.5 MG/ML
INJECTION, SOLUTION EPIDURAL; INFILTRATION; INTRACAUDAL PRN
Status: DISCONTINUED | OUTPATIENT
Start: 2024-08-29 | End: 2024-08-29 | Stop reason: ALTCHOICE

## 2024-08-29 RX ORDER — LEVOTHYROXINE SODIUM 50 UG/1
50 TABLET ORAL DAILY
Status: DISCONTINUED | OUTPATIENT
Start: 2024-08-30 | End: 2024-09-03 | Stop reason: HOSPADM

## 2024-08-29 RX ORDER — GABAPENTIN 100 MG/1
100 CAPSULE ORAL 3 TIMES DAILY
Status: DISCONTINUED | OUTPATIENT
Start: 2024-08-29 | End: 2024-09-01

## 2024-08-29 RX ORDER — NALOXONE HYDROCHLORIDE 0.4 MG/ML
0.4 INJECTION, SOLUTION INTRAMUSCULAR; INTRAVENOUS; SUBCUTANEOUS PRN
Status: DISCONTINUED | OUTPATIENT
Start: 2024-08-29 | End: 2024-08-30

## 2024-08-29 RX ORDER — SODIUM CHLORIDE 0.9 % (FLUSH) 0.9 %
5-40 SYRINGE (ML) INJECTION EVERY 12 HOURS SCHEDULED
Status: DISCONTINUED | OUTPATIENT
Start: 2024-08-29 | End: 2024-09-03 | Stop reason: HOSPADM

## 2024-08-29 RX ORDER — ACETAMINOPHEN 500 MG
1000 TABLET ORAL EVERY 6 HOURS
Status: DISCONTINUED | OUTPATIENT
Start: 2024-08-29 | End: 2024-09-03 | Stop reason: HOSPADM

## 2024-08-29 RX ORDER — OXYCODONE HYDROCHLORIDE 5 MG/1
5 TABLET ORAL EVERY 4 HOURS PRN
Status: DISCONTINUED | OUTPATIENT
Start: 2024-08-29 | End: 2024-08-31

## 2024-08-29 RX ORDER — SODIUM CHLORIDE 9 MG/ML
INJECTION, SOLUTION INTRAVENOUS PRN
Status: DISCONTINUED | OUTPATIENT
Start: 2024-08-29 | End: 2024-09-03 | Stop reason: HOSPADM

## 2024-08-29 RX ORDER — PHENYLEPHRINE HCL IN 0.9% NACL 1 MG/10 ML
SYRINGE (ML) INTRAVENOUS PRN
Status: DISCONTINUED | OUTPATIENT
Start: 2024-08-29 | End: 2024-08-29 | Stop reason: SDUPTHER

## 2024-08-29 RX ORDER — MAGNESIUM HYDROXIDE 1200 MG/15ML
LIQUID ORAL CONTINUOUS PRN
Status: COMPLETED | OUTPATIENT
Start: 2024-08-29 | End: 2024-08-29

## 2024-08-29 RX ORDER — ROCURONIUM BROMIDE 10 MG/ML
INJECTION, SOLUTION INTRAVENOUS PRN
Status: DISCONTINUED | OUTPATIENT
Start: 2024-08-29 | End: 2024-08-29 | Stop reason: SDUPTHER

## 2024-08-29 RX ORDER — IPRATROPIUM BROMIDE AND ALBUTEROL SULFATE 2.5; .5 MG/3ML; MG/3ML
1 SOLUTION RESPIRATORY (INHALATION)
Status: DISCONTINUED | OUTPATIENT
Start: 2024-08-29 | End: 2024-09-03

## 2024-08-29 RX ORDER — FENTANYL CITRATE 50 UG/ML
INJECTION, SOLUTION INTRAMUSCULAR; INTRAVENOUS PRN
Status: DISCONTINUED | OUTPATIENT
Start: 2024-08-29 | End: 2024-08-29 | Stop reason: SDUPTHER

## 2024-08-29 RX ORDER — CEFAZOLIN SODIUM 1 G/3ML
INJECTION, POWDER, FOR SOLUTION INTRAMUSCULAR; INTRAVENOUS PRN
Status: DISCONTINUED | OUTPATIENT
Start: 2024-08-29 | End: 2024-08-29 | Stop reason: SDUPTHER

## 2024-08-29 RX ORDER — SENNA AND DOCUSATE SODIUM 50; 8.6 MG/1; MG/1
1 TABLET, FILM COATED ORAL 2 TIMES DAILY
Status: DISCONTINUED | OUTPATIENT
Start: 2024-08-29 | End: 2024-09-01

## 2024-08-29 RX ORDER — BISACODYL 5 MG/1
5 TABLET, DELAYED RELEASE ORAL DAILY
Status: DISCONTINUED | OUTPATIENT
Start: 2024-08-29 | End: 2024-09-01

## 2024-08-29 RX ORDER — BISACODYL 10 MG
10 SUPPOSITORY, RECTAL RECTAL DAILY PRN
Status: DISCONTINUED | OUTPATIENT
Start: 2024-08-29 | End: 2024-09-03 | Stop reason: HOSPADM

## 2024-08-29 RX ORDER — SODIUM CHLORIDE 9 MG/ML
INJECTION, SOLUTION INTRAVENOUS PRN
Status: DISCONTINUED | OUTPATIENT
Start: 2024-08-29 | End: 2024-08-29 | Stop reason: HOSPADM

## 2024-08-29 RX ADMIN — OXYCODONE HYDROCHLORIDE 10 MG: 10 TABLET ORAL at 13:44

## 2024-08-29 RX ADMIN — SODIUM CHLORIDE: 9 INJECTION, SOLUTION INTRAVENOUS at 06:42

## 2024-08-29 RX ADMIN — FENTANYL CITRATE 50 MCG: 50 INJECTION INTRAMUSCULAR; INTRAVENOUS at 09:13

## 2024-08-29 RX ADMIN — Medication 100 MCG: at 08:50

## 2024-08-29 RX ADMIN — GABAPENTIN 100 MG: 100 CAPSULE ORAL at 14:27

## 2024-08-29 RX ADMIN — FENTANYL CITRATE 50 MCG: 50 INJECTION INTRAMUSCULAR; INTRAVENOUS at 09:42

## 2024-08-29 RX ADMIN — BISACODYL 5 MG: 5 TABLET, COATED ORAL at 14:27

## 2024-08-29 RX ADMIN — SODIUM CHLORIDE, PRESERVATIVE FREE 10 ML: 5 INJECTION INTRAVENOUS at 22:00

## 2024-08-29 RX ADMIN — SENNOSIDES AND DOCUSATE SODIUM 1 TABLET: 50; 8.6 TABLET ORAL at 14:27

## 2024-08-29 RX ADMIN — FENTANYL CITRATE 100 MCG: 50 INJECTION INTRAMUSCULAR; INTRAVENOUS at 08:33

## 2024-08-29 RX ADMIN — SUGAMMADEX 200 MG: 100 INJECTION, SOLUTION INTRAVENOUS at 11:39

## 2024-08-29 RX ADMIN — CEFAZOLIN 2 G: 330 INJECTION, POWDER, FOR SOLUTION INTRAMUSCULAR; INTRAVENOUS at 08:30

## 2024-08-29 RX ADMIN — PROPOFOL 200 MG: 10 INJECTION, EMULSION INTRAVENOUS at 07:40

## 2024-08-29 RX ADMIN — IPRATROPIUM BROMIDE AND ALBUTEROL SULFATE 1 DOSE: .5; 3 SOLUTION RESPIRATORY (INHALATION) at 20:11

## 2024-08-29 RX ADMIN — PROPOFOL 20 MG: 10 INJECTION, EMULSION INTRAVENOUS at 11:53

## 2024-08-29 RX ADMIN — PROPOFOL 20 MG: 10 INJECTION, EMULSION INTRAVENOUS at 11:49

## 2024-08-29 RX ADMIN — MORPHINE SULFATE 4 MG: 4 INJECTION, SOLUTION INTRAMUSCULAR; INTRAVENOUS at 14:35

## 2024-08-29 RX ADMIN — PROPOFOL 20 MG: 10 INJECTION, EMULSION INTRAVENOUS at 12:01

## 2024-08-29 RX ADMIN — FENTANYL CITRATE 100 MCG: 50 INJECTION INTRAMUSCULAR; INTRAVENOUS at 11:39

## 2024-08-29 RX ADMIN — HEPARIN SODIUM 5000 UNITS: 5000 INJECTION INTRAVENOUS; SUBCUTANEOUS at 22:00

## 2024-08-29 RX ADMIN — ONDANSETRON 4 MG: 2 INJECTION INTRAMUSCULAR; INTRAVENOUS at 11:39

## 2024-08-29 RX ADMIN — Medication 100 MCG: at 08:48

## 2024-08-29 RX ADMIN — SODIUM CHLORIDE, SODIUM LACTATE, POTASSIUM CHLORIDE, AND CALCIUM CHLORIDE: 600; 310; 30; 20 INJECTION, SOLUTION INTRAVENOUS at 07:35

## 2024-08-29 RX ADMIN — IPRATROPIUM BROMIDE AND ALBUTEROL SULFATE 1 DOSE: .5; 3 SOLUTION RESPIRATORY (INHALATION) at 15:56

## 2024-08-29 RX ADMIN — FAMOTIDINE 20 MG: 10 INJECTION, SOLUTION INTRAVENOUS at 21:59

## 2024-08-29 RX ADMIN — FENTANYL CITRATE 50 MCG: 50 INJECTION INTRAMUSCULAR; INTRAVENOUS at 08:35

## 2024-08-29 RX ADMIN — FENTANYL CITRATE 100 MCG: 50 INJECTION INTRAMUSCULAR; INTRAVENOUS at 07:45

## 2024-08-29 RX ADMIN — ROCURONIUM BROMIDE 30 MG: 10 INJECTION, SOLUTION INTRAVENOUS at 09:18

## 2024-08-29 RX ADMIN — ACETAMINOPHEN 1000 MG: 500 TABLET ORAL at 14:27

## 2024-08-29 RX ADMIN — PROPOFOL 20 MG: 10 INJECTION, EMULSION INTRAVENOUS at 11:57

## 2024-08-29 RX ADMIN — SENNOSIDES AND DOCUSATE SODIUM 1 TABLET: 50; 8.6 TABLET ORAL at 22:00

## 2024-08-29 RX ADMIN — ROCURONIUM BROMIDE 100 MG: 10 INJECTION, SOLUTION INTRAVENOUS at 07:40

## 2024-08-29 RX ADMIN — GABAPENTIN 100 MG: 100 CAPSULE ORAL at 21:30

## 2024-08-29 RX ADMIN — LIDOCAINE HYDROCHLORIDE 100 MG: 20 INJECTION, SOLUTION EPIDURAL; INFILTRATION; INTRACAUDAL; PERINEURAL at 07:40

## 2024-08-29 RX ADMIN — ESCITALOPRAM 5 MG: 10 TABLET, FILM COATED ORAL at 14:27

## 2024-08-29 RX ADMIN — DEXTROSE AND SODIUM CHLORIDE: 5; 450 INJECTION, SOLUTION INTRAVENOUS at 12:53

## 2024-08-29 RX ADMIN — HEPARIN SODIUM 5000 UNITS: 5000 INJECTION INTRAVENOUS; SUBCUTANEOUS at 14:27

## 2024-08-29 RX ADMIN — FAMOTIDINE 20 MG: 10 INJECTION, SOLUTION INTRAVENOUS at 12:59

## 2024-08-29 RX ADMIN — MORPHINE SULFATE 4 MG: 4 INJECTION, SOLUTION INTRAMUSCULAR; INTRAVENOUS at 12:41

## 2024-08-29 ASSESSMENT — PAIN - FUNCTIONAL ASSESSMENT: PAIN_FUNCTIONAL_ASSESSMENT: 0-10

## 2024-08-29 ASSESSMENT — PAIN DESCRIPTION - LOCATION
LOCATION: CHEST;FLANK
LOCATION: BACK;CHEST;FLANK
LOCATION: CHEST;FLANK
LOCATION: CHEST;FLANK

## 2024-08-29 ASSESSMENT — PAIN DESCRIPTION - ONSET
ONSET: ON-GOING

## 2024-08-29 ASSESSMENT — PAIN DESCRIPTION - ORIENTATION
ORIENTATION: RIGHT

## 2024-08-29 ASSESSMENT — PAIN SCALES - GENERAL
PAINLEVEL_OUTOF10: 10
PAINLEVEL_OUTOF10: 10
PAINLEVEL_OUTOF10: 8
PAINLEVEL_OUTOF10: 9
PAINLEVEL_OUTOF10: 8

## 2024-08-29 ASSESSMENT — PAIN DESCRIPTION - DESCRIPTORS
DESCRIPTORS: ACHING
DESCRIPTORS: ACHING;SHARP
DESCRIPTORS: ACHING
DESCRIPTORS: ACHING

## 2024-08-29 ASSESSMENT — PAIN DESCRIPTION - FREQUENCY
FREQUENCY: CONTINUOUS

## 2024-08-29 ASSESSMENT — PAIN DESCRIPTION - PAIN TYPE
TYPE: SURGICAL PAIN

## 2024-08-29 NOTE — ANESTHESIA PROCEDURE NOTES
Central Venous Line:    A central venous line was placed using ultrasound guidance, in the OR for the following indication(s): central venous access.    Sterility preparation included the following: provider used sterile gloves, gown, hat and mask, hand hygiene performed prior to central venous catheter insertion, sterile gel and probe cover used in ultrasound-guided central venous catheter insertion and maximum sterile barriers used during central venous catheter insertion.    The patient was placed in Trendelenburg position.The right internal jugular vein was prepped.    The site was prepped with Chloraprep.double lumen was placed.    During the procedure, the following specific steps were taken: target vein identified, needle advanced into vein and blood aspirated and guidewire advanced into vein.    Intravenous verification was obtained by ultrasound, venous blood return and manometry.    Post insertion care included: all ports aspirated, all ports flushed easily, guidewire removed intact, Biopatch applied, line sutured in place and dressing applied.    During the procedure the patient experienced: patient tolerated procedure well with no complications.      Outcomes: uncomplicated and patient tolerated procedure well  Anesthesia type: general  Staffing  Performed: Anesthesiologist   Performed by: Alfredo Zarate MD  Authorized by: Alfredo Zarate MD    Preanesthetic Checklist  Completed: patient identified, IV checked, site marked, risks and benefits discussed, surgical/procedural consents, equipment checked, pre-op evaluation, timeout performed, anesthesia consent given, oxygen available, monitors applied/VS acknowledged, fire risk safety assessment completed and verbalized and blood product R/B/A discussed and consented

## 2024-08-29 NOTE — ANESTHESIA POSTPROCEDURE EVALUATION
Department of Anesthesiology  Postprocedure Note    Patient: Felicity Oquendo  MRN: [id number]  YOB: 1950  Date of evaluation: 8/29/2024    Procedure Summary       Date: 08/29/24 Room / Location: [facility] 01 / [facility] CARDIAC SURGERY    Anesthesia Start: 0733 Anesthesia Stop: 1222    Procedure: MINI THORACOTOMY , RIGHT UPPER LOBECTOMY WITH FROZEN SECTION, MEDIASTINAL LYMPHNODE DISECTION (Right) Diagnosis:       Malignant neoplasm of bronchus, right (HCC)      (Malignant neoplasm of bronchus, right (HCC) [C34.91])    Surgeons: Rashard Palumbo MD Responsible Provider: Alfredo Zarate MD    Anesthesia Type: general ASA Status: 4            Anesthesia Type: No value filed.    Michelle Phase I:      Michelle Phase II:      Anesthesia Post Evaluation    Patient location during evaluation: PACU  Patient participation: complete - patient participated  Level of consciousness: awake  Airway patency: patent  Nausea & Vomiting: no vomiting and no nausea  Cardiovascular status: hemodynamically stable  Respiratory status: acceptable  Hydration status: euvolemic  Pain management: adequate    No notable events documented.

## 2024-08-29 NOTE — ANESTHESIA PROCEDURE NOTES
Arterial Line:    An arterial line was placed using ultrasound guidance, in the OR for the following indication(s): continuous blood pressure monitoring and blood sampling needed.    A  (size) (length) (type) catheter was placed, Seldinger technique used, into the left radial artery, secured by Tegaderm.  Anesthesia type: General    Events:  patient tolerated procedure well with no complications.  Performed: Anesthesiologist and Other anesthesia staff   Preanesthetic Checklist  Completed: patient identified, IV checked, site marked, risks and benefits discussed, surgical/procedural consents, equipment checked, pre-op evaluation, timeout performed, anesthesia consent given, oxygen available, monitors applied/VS acknowledged, fire risk safety assessment completed and verbalized and blood product R/B/A discussed and consented

## 2024-08-29 NOTE — H&P
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  Patient was referred for thoracic surgery evaluation  History: 74-year-old patient, was found to have an incidental right upper lobe spiculated lung nodule.  Patient was riding in the car for a while and got short of breath with chest pain.  She checked into the emergency department and a CT angiogram was performed to rule out pulmonary embolism which was negative.  At that time she was found to have a right upper lobe lung mass on 5/28/2024.  Patient does carry a diagnosis of COPD and uses inhalers.  She gets short of breath with mild to moderate exertion.  She came climb 1 flight of stairs but would get short of breath at the end.  She does not have home oxygen.  Patient underwent a CT-guided lung biopsy that was performed on 6/24/2024 and the pathology came back as squamous cell carcinoma.  Patient had a PET/CT on 6/20/2024 which showed hypermetabolic right upper lobe lung mass with some mediastinal activity in the subcarinal and peribronchial lymph nodes on the right side.  Patient underwent a EBUS and biopsy of the subcarinal and peribronchial lymph nodes were performed which came back negative for malignancy.  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  Patient underwent a set of pulmonary function tests on 7/1/2024 which shows FEV1 of 1.63 L which is 56% of predicted and diffusion capacity of carbon monoxide is 61% predicted.  The patient also underwent a set ofBlood gas which was unfortunately a venous gas which showed a pO2 of 30 and a pCO2 of 29.  With these findings she is in our office for discussion of a lung resection.  Past Medical History        Past Medical History:   Diagnosis Date    COPD (chronic obstructive pulmonary disease) (HCC)      Diabetes (HCC)      GERD

## 2024-08-29 NOTE — PERIOP NOTE
Patient /Family /Designee has been informed that [facility] is not responsible for patient belongings per policy and the signed [facility] Patient Agreement document.  Personal items should be sent home or checked in with security.  Patient /Family /Designee selected the following action:                            [x]  Send personal items home with a family member or friend                                                 []  Check in personal items with security, excluding clothing                            []  Maintain personal items at the bedside, against recommendation                                 by Benito Perea [facility]                                   ** If patient /family /designee chooses to maintain personal items at the bedside,                                      Complete the patient belongings inventory in the EMR.

## 2024-08-29 NOTE — OP NOTE
Operative Note      Patient: Felicity Oquendo  YOB: 1950  MRN: [id number]    Date of Procedure: 8/29/2024    Preoperative diagnosis: Right upper lobe lung mass, squamous cell carcinoma on the CT-guided needle biopsy, negative metastatic workup with EBUS as well as PET/CT and brain MRI  Postop diagnosis: Same  Procedure:  Right thoracotomy, right upper lobectomy  Full mediastinal lymph node dissection      Anesthesia: General Endotracheal, double-lumen endotracheal tube  Anesthesia: Alfredo Zarate M.D.  Intraprocedure Complications: None  Estimated Blood Loss: 150 ml    Surgeon: Rashard Palumbo M.D., FACS  Assistant: ARA Payan    Description of the Procedure:  Patient was taken to the operating room, placed supine position on the operating table. General endotracheal anesthesia is induced with a double lumen endotracheal tube without incidence.  After placement of adequate monitoring lines and catheters, patient was rotated right side up.  Right anterior posterior lateral chest was prepped and draped in usual fashion.  Timeout procedures were performed.  Right lung was deflated by the anesthesiologist.  A posterolateral thoracotomy skin incision was made overlying the fifth rib.  Incision was carried down to the underlying chest wall using electrocautery.  Latissimus dorsi muscle was partially divided and serratus anterior muscle was  from its origin.  Pleural cavity was entered at the fourth interspace.  Patient's chest wall was quite deep and exposure was rather difficult.  Patient also had frequent episodes of desaturation during single lung anesthesia.  A soft tissue retractor was then placed however there was still very limited exposure due to the depth of the chest wall where we will not able to insert an intercostal rib retractor comfortably.  At this time I decided to remove the fifth rib partially.  50 was divided proximally and distally using rib varinder.  I then

## 2024-08-29 NOTE — ANESTHESIA PRE PROCEDURE
Department of Anesthesiology  Preprocedure Note       Name:  Felicity Oquendo   Age:  74 y.o.  :  1950                                          MRN:  [id number]         Date:  2024      Surgeon: Surgeon(s):  Rashard Palumbo MD    Procedure: Procedure(s):  MINI THORACOTOMY , RIGHT UPPER LOBECTOMY WITH FROZEN SECTION, MEDIASTINAL LYMPHNODE DISECTION    Medications prior to admission:   Prior to Admission medications    Medication Sig Start Date End Date Taking? Authorizing Provider   chlorhexidine gluconate (HIBICLENS) 4 % SOLN external solution Apply topically daily as needed (prior to surgery) 24 Yes Stephanie Elder, PA-C   XANAX 0.5 MG tablet Take 1 tablet by mouth. 24  Yes Hui House MD   albuterol sulfate HFA (PROVENTIL HFA) 108 (90 Base) MCG/ACT inhaler Inhale 2 puffs into the lungs every 6 hours as needed for Wheezing 24  Yes Cam Blair MD   escitalopram (LEXAPRO) 5 MG tablet Take 1 tablet by mouth daily   Yes ProviderHui MD   rosuvastatin (CRESTOR) 5 MG tablet Take 1 tablet by mouth daily   Yes Provider, MD Hui   metFORMIN (GLUCOPHAGE) 500 MG tablet Take 1 tablet by mouth 2 times daily (with meals)   Yes Provider, MD Hui   acetaminophen (TYLENOL) 500 MG tablet Take 2 tablets by mouth every 6 hours as needed for Pain   Yes Provider, MD Hui   FLONASE ALLERGY RELIEF 50 MCG/ACT nasal spray 1-2 sprays by Nasal route daily as needed for Allergies or Rhinitis   Yes ProviderHui MD   omeprazole (PRILOSEC) 40 MG delayed release capsule Take 1 capsule by mouth daily   Yes ProviderHui MD   celecoxib (CELEBREX) 200 MG capsule Take 1 capsule by mouth 23  Yes Hui House MD   metoprolol succinate (TOPROL XL) 25 MG extended release tablet Take 1 tablet by mouth daily   Yes ProviderHui MD   SITagliptin (JANUVIA) 100 MG tablet Take 1 tablet by mouth daily   Yes Hui House MD

## 2024-08-30 ENCOUNTER — APPOINTMENT (OUTPATIENT)
Facility: HOSPITAL | Age: 74
DRG: 165 | End: 2024-08-30
Attending: THORACIC SURGERY (CARDIOTHORACIC VASCULAR SURGERY)
Payer: MEDICARE

## 2024-08-30 LAB
ANION GAP SERPL CALC-SCNC: 5 MMOL/L (ref 3–18)
BASOPHILS # BLD: 0 K/UL (ref 0–0.1)
BASOPHILS NFR BLD: 0 % (ref 0–2)
BUN SERPL-MCNC: 16 MG/DL (ref 7–18)
BUN/CREAT SERPL: 19 (ref 12–20)
CALCIUM SERPL-MCNC: 8.6 MG/DL (ref 8.5–10.1)
CHLORIDE SERPL-SCNC: 104 MMOL/L (ref 100–111)
CO2 SERPL-SCNC: 26 MMOL/L (ref 21–32)
CREAT SERPL-MCNC: 0.84 MG/DL (ref 0.6–1.3)
DIFFERENTIAL METHOD BLD: ABNORMAL
EOSINOPHIL # BLD: 0 K/UL (ref 0–0.4)
EOSINOPHIL NFR BLD: 0 % (ref 0–5)
ERYTHROCYTE [DISTWIDTH] IN BLOOD BY AUTOMATED COUNT: 13.7 % (ref 11.6–14.5)
GLUCOSE SERPL-MCNC: 166 MG/DL (ref 74–99)
HCT VFR BLD AUTO: 38.2 % (ref 35–45)
HGB BLD-MCNC: 11.6 G/DL (ref 12–16)
IMM GRANULOCYTES # BLD AUTO: 0.1 K/UL (ref 0–0.04)
IMM GRANULOCYTES NFR BLD AUTO: 0 % (ref 0–0.5)
LYMPHOCYTES # BLD: 1.1 K/UL (ref 0.9–3.6)
LYMPHOCYTES NFR BLD: 8 % (ref 21–52)
MCH RBC QN AUTO: 26.2 PG (ref 24–34)
MCHC RBC AUTO-ENTMCNC: 30.4 G/DL (ref 31–37)
MCV RBC AUTO: 86.4 FL (ref 78–100)
MONOCYTES # BLD: 0.9 K/UL (ref 0.05–1.2)
MONOCYTES NFR BLD: 7 % (ref 3–10)
NEUTS SEG # BLD: 11.9 K/UL (ref 1.8–8)
NEUTS SEG NFR BLD: 85 % (ref 40–73)
NRBC # BLD: 0 K/UL (ref 0–0.01)
NRBC BLD-RTO: 0 PER 100 WBC
PLATELET # BLD AUTO: 275 K/UL (ref 135–420)
PMV BLD AUTO: 8.7 FL (ref 9.2–11.8)
POTASSIUM SERPL-SCNC: 4.8 MMOL/L (ref 3.5–5.5)
RBC # BLD AUTO: 4.42 M/UL (ref 4.2–5.3)
SODIUM SERPL-SCNC: 135 MMOL/L (ref 136–145)
WBC # BLD AUTO: 14 K/UL (ref 4.6–13.2)

## 2024-08-30 PROCEDURE — 6370000000 HC RX 637 (ALT 250 FOR IP): Performed by: PHYSICIAN ASSISTANT

## 2024-08-30 PROCEDURE — 2000000000 HC ICU R&B

## 2024-08-30 PROCEDURE — 2700000000 HC OXYGEN THERAPY PER DAY

## 2024-08-30 PROCEDURE — 2580000003 HC RX 258: Performed by: PHYSICIAN ASSISTANT

## 2024-08-30 PROCEDURE — 97535 SELF CARE MNGMENT TRAINING: CPT

## 2024-08-30 PROCEDURE — 97116 GAIT TRAINING THERAPY: CPT

## 2024-08-30 PROCEDURE — 51798 US URINE CAPACITY MEASURE: CPT

## 2024-08-30 PROCEDURE — 71045 X-RAY EXAM CHEST 1 VIEW: CPT

## 2024-08-30 PROCEDURE — 80048 BASIC METABOLIC PNL TOTAL CA: CPT

## 2024-08-30 PROCEDURE — 97530 THERAPEUTIC ACTIVITIES: CPT

## 2024-08-30 PROCEDURE — 94761 N-INVAS EAR/PLS OXIMETRY MLT: CPT

## 2024-08-30 PROCEDURE — 85025 COMPLETE CBC W/AUTO DIFF WBC: CPT

## 2024-08-30 PROCEDURE — 94640 AIRWAY INHALATION TREATMENT: CPT

## 2024-08-30 PROCEDURE — 97162 PT EVAL MOD COMPLEX 30 MIN: CPT

## 2024-08-30 PROCEDURE — 97165 OT EVAL LOW COMPLEX 30 MIN: CPT

## 2024-08-30 PROCEDURE — 99024 POSTOP FOLLOW-UP VISIT: CPT | Performed by: PHYSICIAN ASSISTANT

## 2024-08-30 PROCEDURE — 6360000002 HC RX W HCPCS: Performed by: PHYSICIAN ASSISTANT

## 2024-08-30 RX ADMIN — ESCITALOPRAM 5 MG: 10 TABLET, FILM COATED ORAL at 08:25

## 2024-08-30 RX ADMIN — OXYCODONE HYDROCHLORIDE 5 MG: 5 TABLET ORAL at 12:05

## 2024-08-30 RX ADMIN — ONDANSETRON 4 MG: 2 INJECTION INTRAMUSCULAR; INTRAVENOUS at 11:58

## 2024-08-30 RX ADMIN — FAMOTIDINE 20 MG: 20 TABLET ORAL at 22:24

## 2024-08-30 RX ADMIN — OXYCODONE HYDROCHLORIDE 5 MG: 5 TABLET ORAL at 22:23

## 2024-08-30 RX ADMIN — HEPARIN SODIUM 5000 UNITS: 5000 INJECTION INTRAVENOUS; SUBCUTANEOUS at 22:30

## 2024-08-30 RX ADMIN — FAMOTIDINE 20 MG: 20 TABLET ORAL at 08:25

## 2024-08-30 RX ADMIN — METFORMIN HYDROCHLORIDE 500 MG: 500 TABLET ORAL at 08:25

## 2024-08-30 RX ADMIN — IPRATROPIUM BROMIDE AND ALBUTEROL SULFATE 1 DOSE: .5; 3 SOLUTION RESPIRATORY (INHALATION) at 12:37

## 2024-08-30 RX ADMIN — LEVOTHYROXINE SODIUM 50 MCG: 0.05 TABLET ORAL at 06:32

## 2024-08-30 RX ADMIN — ACETAMINOPHEN 1000 MG: 500 TABLET ORAL at 17:09

## 2024-08-30 RX ADMIN — BISACODYL 5 MG: 5 TABLET, COATED ORAL at 08:24

## 2024-08-30 RX ADMIN — SENNOSIDES AND DOCUSATE SODIUM 1 TABLET: 50; 8.6 TABLET ORAL at 08:24

## 2024-08-30 RX ADMIN — OXYCODONE HYDROCHLORIDE 5 MG: 5 TABLET ORAL at 17:42

## 2024-08-30 RX ADMIN — IPRATROPIUM BROMIDE AND ALBUTEROL SULFATE 1 DOSE: .5; 3 SOLUTION RESPIRATORY (INHALATION) at 15:18

## 2024-08-30 RX ADMIN — METFORMIN HYDROCHLORIDE 500 MG: 500 TABLET ORAL at 17:09

## 2024-08-30 RX ADMIN — METOPROLOL SUCCINATE 25 MG: 25 TABLET, EXTENDED RELEASE ORAL at 08:25

## 2024-08-30 RX ADMIN — SENNOSIDES AND DOCUSATE SODIUM 1 TABLET: 50; 8.6 TABLET ORAL at 22:25

## 2024-08-30 RX ADMIN — GABAPENTIN 100 MG: 100 CAPSULE ORAL at 14:30

## 2024-08-30 RX ADMIN — OXYCODONE HYDROCHLORIDE 5 MG: 5 TABLET ORAL at 08:24

## 2024-08-30 RX ADMIN — SODIUM CHLORIDE, PRESERVATIVE FREE 10 ML: 5 INJECTION INTRAVENOUS at 08:36

## 2024-08-30 RX ADMIN — IPRATROPIUM BROMIDE AND ALBUTEROL SULFATE 1 DOSE: .5; 3 SOLUTION RESPIRATORY (INHALATION) at 20:10

## 2024-08-30 RX ADMIN — ACETAMINOPHEN 1000 MG: 500 TABLET ORAL at 06:31

## 2024-08-30 RX ADMIN — HEPARIN SODIUM 5000 UNITS: 5000 INJECTION INTRAVENOUS; SUBCUTANEOUS at 06:30

## 2024-08-30 RX ADMIN — SODIUM CHLORIDE, PRESERVATIVE FREE 10 ML: 5 INJECTION INTRAVENOUS at 22:43

## 2024-08-30 RX ADMIN — HEPARIN SODIUM 5000 UNITS: 5000 INJECTION INTRAVENOUS; SUBCUTANEOUS at 14:30

## 2024-08-30 RX ADMIN — POLYETHYLENE GLYCOL 3350 17 G: 17 POWDER, FOR SOLUTION ORAL at 08:24

## 2024-08-30 RX ADMIN — GABAPENTIN 100 MG: 100 CAPSULE ORAL at 22:23

## 2024-08-30 RX ADMIN — ACETAMINOPHEN 1000 MG: 500 TABLET ORAL at 12:05

## 2024-08-30 RX ADMIN — LIOTHYRONINE SODIUM 25 MCG: 25 TABLET ORAL at 08:25

## 2024-08-30 RX ADMIN — GABAPENTIN 100 MG: 100 CAPSULE ORAL at 08:24

## 2024-08-30 ASSESSMENT — PAIN DESCRIPTION - LOCATION
LOCATION: BACK;CHEST
LOCATION: BACK;CHEST
LOCATION: BACK
LOCATION: BACK;CHEST

## 2024-08-30 ASSESSMENT — PAIN DESCRIPTION - DESCRIPTORS
DESCRIPTORS: ACHING
DESCRIPTORS: ACHING
DESCRIPTORS: ACHING;SHARP
DESCRIPTORS: ACHING;SHARP
DESCRIPTORS: ACHING
DESCRIPTORS: ACHING;SHARP

## 2024-08-30 ASSESSMENT — PAIN DESCRIPTION - ONSET
ONSET: ON-GOING

## 2024-08-30 ASSESSMENT — PAIN SCALES - GENERAL
PAINLEVEL_OUTOF10: 3
PAINLEVEL_OUTOF10: 6
PAINLEVEL_OUTOF10: 6
PAINLEVEL_OUTOF10: 4
PAINLEVEL_OUTOF10: 4
PAINLEVEL_OUTOF10: 0
PAINLEVEL_OUTOF10: 8
PAINLEVEL_OUTOF10: 9

## 2024-08-30 ASSESSMENT — PAIN DESCRIPTION - FREQUENCY
FREQUENCY: CONTINUOUS
FREQUENCY: INTERMITTENT

## 2024-08-30 ASSESSMENT — PAIN - FUNCTIONAL ASSESSMENT
PAIN_FUNCTIONAL_ASSESSMENT: ACTIVITIES ARE NOT PREVENTED

## 2024-08-30 ASSESSMENT — PAIN DESCRIPTION - ORIENTATION
ORIENTATION: RIGHT

## 2024-08-30 ASSESSMENT — PAIN DESCRIPTION - PAIN TYPE
TYPE: SURGICAL PAIN

## 2024-08-30 NOTE — CARE COORDINATION
08/30/24 1250   IMM Letter   IMM Letter given to Patient/Family/Significant other/Guardian/POA/by: Eliane Fuentes RN   IMM Letter date given: 08/30/24   IMM Letter time given: 1250     Eliane Fuentes, MSN, RN  Care Manager    [place] [address]  Office: [phone]  Fax: [phone]

## 2024-08-30 NOTE — CARE COORDINATION
08/30/24 1250   Service Assessment   Patient Orientation Alert and Oriented   Cognition Alert   History Provided By Patient   Primary Caregiver Family   Accompanied By/Relationship Aditi Gamboa (Daughter) [phone]   Support Systems Spouse/Significant Other;Family Members   Patient's Healthcare Decision Maker is: Legal Next of Kin   PCP Verified by CM Yes   Last Visit to PCP Within last 3 months   Prior Functional Level Independent in ADLs/IADLs   Current Functional Level Independent in ADLs/IADLs   Can patient return to prior living arrangement Yes   Ability to make needs known: Good   Family able to assist with home care needs: Yes   Would you like for me to discuss the discharge plan with any other family members/significant others, and if so, who? Yes   Financial Resources None   Community Resources None   CM/SW Referral New parent   Social/Functional History   Lives With Spouse   Type of Home House   Home Layout Two level   Home Access Stairs to enter with rails   Entrance Stairs - Number of Steps 1   Entrance Stairs - Rails Both   Bathroom Shower/Tub Walk-in shower   Bathroom Toilet Standard   Bathroom Equipment Grab bars in shower   Bathroom Accessibility Accessible   Home Equipment Walker - Standard   Receives Help From Family  (Aditi Gamboa (Daughter) [phone])   ADL Assistance Independent   Homemaking Assistance Independent   Ambulation Assistance Independent   Transfer Assistance Independent   Active  Yes   Mode of Transportation Car   Education n/a   Type of Occupation n/a   Discharge Planning   Type of Residence House   Living Arrangements Spouse/Significant Other   Current Services Prior To Admission None   Potential Assistance Needed Durable Medical Equipment   Potential Assistance Purchasing Medications No   Services At/After Discharge   Transition of Care Consult (CM Consult) Discharge Planning   Services At/After Discharge PT;OT   [place] Resource Information Provided? No   Mode of

## 2024-08-31 ENCOUNTER — APPOINTMENT (OUTPATIENT)
Facility: HOSPITAL | Age: 74
DRG: 165 | End: 2024-08-31
Attending: THORACIC SURGERY (CARDIOTHORACIC VASCULAR SURGERY)
Payer: MEDICARE

## 2024-08-31 LAB
ANION GAP SERPL CALC-SCNC: 3 MMOL/L (ref 3–18)
BASOPHILS # BLD: 0 K/UL (ref 0–0.1)
BASOPHILS NFR BLD: 0 % (ref 0–2)
BUN SERPL-MCNC: 16 MG/DL (ref 7–18)
BUN/CREAT SERPL: 19 (ref 12–20)
CALCIUM SERPL-MCNC: 8.6 MG/DL (ref 8.5–10.1)
CHLORIDE SERPL-SCNC: 101 MMOL/L (ref 100–111)
CO2 SERPL-SCNC: 29 MMOL/L (ref 21–32)
CREAT SERPL-MCNC: 0.83 MG/DL (ref 0.6–1.3)
DIFFERENTIAL METHOD BLD: ABNORMAL
EOSINOPHIL # BLD: 0 K/UL (ref 0–0.4)
EOSINOPHIL NFR BLD: 0 % (ref 0–5)
ERYTHROCYTE [DISTWIDTH] IN BLOOD BY AUTOMATED COUNT: 13.6 % (ref 11.6–14.5)
GLUCOSE BLD STRIP.AUTO-MCNC: 136 MG/DL (ref 70–110)
GLUCOSE SERPL-MCNC: 175 MG/DL (ref 74–99)
HCT VFR BLD AUTO: 36.5 % (ref 35–45)
HGB BLD-MCNC: 10.9 G/DL (ref 12–16)
IMM GRANULOCYTES # BLD AUTO: 0.1 K/UL (ref 0–0.04)
IMM GRANULOCYTES NFR BLD AUTO: 1 % (ref 0–0.5)
LYMPHOCYTES # BLD: 0.9 K/UL (ref 0.9–3.6)
LYMPHOCYTES NFR BLD: 8 % (ref 21–52)
MCH RBC QN AUTO: 26.2 PG (ref 24–34)
MCHC RBC AUTO-ENTMCNC: 29.9 G/DL (ref 31–37)
MCV RBC AUTO: 87.7 FL (ref 78–100)
MONOCYTES # BLD: 0.9 K/UL (ref 0.05–1.2)
MONOCYTES NFR BLD: 7 % (ref 3–10)
NEUTS SEG # BLD: 10.2 K/UL (ref 1.8–8)
NEUTS SEG NFR BLD: 84 % (ref 40–73)
NRBC # BLD: 0 K/UL (ref 0–0.01)
NRBC BLD-RTO: 0 PER 100 WBC
PLATELET # BLD AUTO: 261 K/UL (ref 135–420)
PMV BLD AUTO: 9.3 FL (ref 9.2–11.8)
POTASSIUM SERPL-SCNC: 4.7 MMOL/L (ref 3.5–5.5)
RBC # BLD AUTO: 4.16 M/UL (ref 4.2–5.3)
SODIUM SERPL-SCNC: 133 MMOL/L (ref 136–145)
WBC # BLD AUTO: 12.1 K/UL (ref 4.6–13.2)

## 2024-08-31 PROCEDURE — 6370000000 HC RX 637 (ALT 250 FOR IP): Performed by: PHYSICIAN ASSISTANT

## 2024-08-31 PROCEDURE — 80048 BASIC METABOLIC PNL TOTAL CA: CPT

## 2024-08-31 PROCEDURE — 6360000002 HC RX W HCPCS: Performed by: PHYSICIAN ASSISTANT

## 2024-08-31 PROCEDURE — 82962 GLUCOSE BLOOD TEST: CPT

## 2024-08-31 PROCEDURE — 94640 AIRWAY INHALATION TREATMENT: CPT

## 2024-08-31 PROCEDURE — 94761 N-INVAS EAR/PLS OXIMETRY MLT: CPT

## 2024-08-31 PROCEDURE — 2580000003 HC RX 258: Performed by: PHYSICIAN ASSISTANT

## 2024-08-31 PROCEDURE — 99024 POSTOP FOLLOW-UP VISIT: CPT | Performed by: PHYSICIAN ASSISTANT

## 2024-08-31 PROCEDURE — 85025 COMPLETE CBC W/AUTO DIFF WBC: CPT

## 2024-08-31 PROCEDURE — 2700000000 HC OXYGEN THERAPY PER DAY

## 2024-08-31 PROCEDURE — 71045 X-RAY EXAM CHEST 1 VIEW: CPT

## 2024-08-31 PROCEDURE — 6360000002 HC RX W HCPCS: Performed by: THORACIC SURGERY (CARDIOTHORACIC VASCULAR SURGERY)

## 2024-08-31 PROCEDURE — 2000000000 HC ICU R&B

## 2024-08-31 RX ORDER — FUROSEMIDE 10 MG/ML
20 INJECTION INTRAMUSCULAR; INTRAVENOUS ONCE
Status: COMPLETED | OUTPATIENT
Start: 2024-08-31 | End: 2024-08-31

## 2024-08-31 RX ORDER — KETOROLAC TROMETHAMINE 15 MG/ML
12.5 INJECTION, SOLUTION INTRAMUSCULAR; INTRAVENOUS
Status: DISCONTINUED | OUTPATIENT
Start: 2024-08-31 | End: 2024-08-31

## 2024-08-31 RX ORDER — KETOROLAC TROMETHAMINE 15 MG/ML
15 INJECTION, SOLUTION INTRAMUSCULAR; INTRAVENOUS
Status: ACTIVE | OUTPATIENT
Start: 2024-08-31 | End: 2024-09-01

## 2024-08-31 RX ORDER — TRAMADOL HYDROCHLORIDE 50 MG/1
50 TABLET ORAL EVERY 6 HOURS PRN
Status: DISCONTINUED | OUTPATIENT
Start: 2024-08-31 | End: 2024-08-31

## 2024-08-31 RX ADMIN — FUROSEMIDE 20 MG: 10 INJECTION, SOLUTION INTRAMUSCULAR; INTRAVENOUS at 09:40

## 2024-08-31 RX ADMIN — SENNOSIDES AND DOCUSATE SODIUM 1 TABLET: 50; 8.6 TABLET ORAL at 20:24

## 2024-08-31 RX ADMIN — FAMOTIDINE 20 MG: 20 TABLET ORAL at 09:40

## 2024-08-31 RX ADMIN — FAMOTIDINE 20 MG: 20 TABLET ORAL at 20:24

## 2024-08-31 RX ADMIN — POLYETHYLENE GLYCOL 3350 17 G: 17 POWDER, FOR SOLUTION ORAL at 09:40

## 2024-08-31 RX ADMIN — HEPARIN SODIUM 5000 UNITS: 5000 INJECTION INTRAVENOUS; SUBCUTANEOUS at 07:02

## 2024-08-31 RX ADMIN — HEPARIN SODIUM 5000 UNITS: 5000 INJECTION INTRAVENOUS; SUBCUTANEOUS at 21:32

## 2024-08-31 RX ADMIN — IPRATROPIUM BROMIDE AND ALBUTEROL SULFATE 1 DOSE: .5; 3 SOLUTION RESPIRATORY (INHALATION) at 21:17

## 2024-08-31 RX ADMIN — METOPROLOL SUCCINATE 25 MG: 25 TABLET, EXTENDED RELEASE ORAL at 09:40

## 2024-08-31 RX ADMIN — BISACODYL 5 MG: 5 TABLET, COATED ORAL at 09:40

## 2024-08-31 RX ADMIN — OXYCODONE HYDROCHLORIDE 10 MG: 10 TABLET ORAL at 02:38

## 2024-08-31 RX ADMIN — ACETAMINOPHEN 1000 MG: 500 TABLET ORAL at 17:59

## 2024-08-31 RX ADMIN — IPRATROPIUM BROMIDE AND ALBUTEROL SULFATE 1 DOSE: .5; 3 SOLUTION RESPIRATORY (INHALATION) at 16:11

## 2024-08-31 RX ADMIN — METFORMIN HYDROCHLORIDE 500 MG: 500 TABLET ORAL at 09:40

## 2024-08-31 RX ADMIN — SODIUM CHLORIDE, PRESERVATIVE FREE 10 ML: 5 INJECTION INTRAVENOUS at 20:24

## 2024-08-31 RX ADMIN — ACETAMINOPHEN 1000 MG: 500 TABLET ORAL at 02:31

## 2024-08-31 RX ADMIN — IPRATROPIUM BROMIDE AND ALBUTEROL SULFATE 1 DOSE: .5; 3 SOLUTION RESPIRATORY (INHALATION) at 12:22

## 2024-08-31 RX ADMIN — LIOTHYRONINE SODIUM 25 MCG: 25 TABLET ORAL at 09:40

## 2024-08-31 RX ADMIN — SENNOSIDES AND DOCUSATE SODIUM 1 TABLET: 50; 8.6 TABLET ORAL at 09:40

## 2024-08-31 RX ADMIN — ESCITALOPRAM 5 MG: 10 TABLET, FILM COATED ORAL at 09:40

## 2024-08-31 RX ADMIN — SODIUM CHLORIDE, PRESERVATIVE FREE 10 ML: 5 INJECTION INTRAVENOUS at 09:42

## 2024-08-31 RX ADMIN — LEVOTHYROXINE SODIUM 50 MCG: 0.05 TABLET ORAL at 07:57

## 2024-08-31 RX ADMIN — IPRATROPIUM BROMIDE AND ALBUTEROL SULFATE 1 DOSE: .5; 3 SOLUTION RESPIRATORY (INHALATION) at 09:21

## 2024-08-31 RX ADMIN — METFORMIN HYDROCHLORIDE 500 MG: 500 TABLET ORAL at 17:59

## 2024-08-31 RX ADMIN — HEPARIN SODIUM 5000 UNITS: 5000 INJECTION INTRAVENOUS; SUBCUTANEOUS at 13:46

## 2024-08-31 ASSESSMENT — PAIN - FUNCTIONAL ASSESSMENT
PAIN_FUNCTIONAL_ASSESSMENT: ACTIVITIES ARE NOT PREVENTED

## 2024-08-31 ASSESSMENT — PAIN SCALES - GENERAL
PAINLEVEL_OUTOF10: 9
PAINLEVEL_OUTOF10: 0
PAINLEVEL_OUTOF10: 0
PAINLEVEL_OUTOF10: 2
PAINLEVEL_OUTOF10: 0
PAINLEVEL_OUTOF10: 9
PAINLEVEL_OUTOF10: 0
PAINLEVEL_OUTOF10: 0

## 2024-08-31 ASSESSMENT — PAIN DESCRIPTION - FREQUENCY
FREQUENCY: CONTINUOUS
FREQUENCY: INTERMITTENT

## 2024-08-31 ASSESSMENT — PAIN DESCRIPTION - ORIENTATION
ORIENTATION: RIGHT
ORIENTATION: RIGHT
ORIENTATION: DISTAL
ORIENTATION: RIGHT

## 2024-08-31 ASSESSMENT — PAIN DESCRIPTION - LOCATION
LOCATION: CHEST;BACK
LOCATION: BACK;CHEST
LOCATION: CHEST;BACK
LOCATION: CHEST;BACK
LOCATION: CHEST;RIB CAGE

## 2024-08-31 ASSESSMENT — PAIN DESCRIPTION - DESCRIPTORS
DESCRIPTORS: ACHING;DISCOMFORT
DESCRIPTORS: ACHING;DISCOMFORT
DESCRIPTORS: ACHING;SORE
DESCRIPTORS: ACHING

## 2024-08-31 ASSESSMENT — PAIN DESCRIPTION - PAIN TYPE
TYPE: SURGICAL PAIN
TYPE: SURGICAL PAIN

## 2024-08-31 ASSESSMENT — PAIN DESCRIPTION - ONSET
ONSET: ON-GOING
ONSET: ON-GOING

## 2024-09-01 ENCOUNTER — APPOINTMENT (OUTPATIENT)
Facility: HOSPITAL | Age: 74
DRG: 165 | End: 2024-09-01
Attending: THORACIC SURGERY (CARDIOTHORACIC VASCULAR SURGERY)
Payer: MEDICARE

## 2024-09-01 LAB
ANION GAP SERPL CALC-SCNC: 3 MMOL/L (ref 3–18)
BASOPHILS # BLD: 0 K/UL (ref 0–0.1)
BASOPHILS NFR BLD: 0 % (ref 0–2)
BUN SERPL-MCNC: 20 MG/DL (ref 7–18)
BUN/CREAT SERPL: 27 (ref 12–20)
CALCIUM SERPL-MCNC: 8.6 MG/DL (ref 8.5–10.1)
CHLORIDE SERPL-SCNC: 101 MMOL/L (ref 100–111)
CO2 SERPL-SCNC: 31 MMOL/L (ref 21–32)
CREAT SERPL-MCNC: 0.73 MG/DL (ref 0.6–1.3)
DIFFERENTIAL METHOD BLD: ABNORMAL
EOSINOPHIL # BLD: 0 K/UL (ref 0–0.4)
EOSINOPHIL NFR BLD: 0 % (ref 0–5)
ERYTHROCYTE [DISTWIDTH] IN BLOOD BY AUTOMATED COUNT: 13.3 % (ref 11.6–14.5)
GLUCOSE SERPL-MCNC: 120 MG/DL (ref 74–99)
HCT VFR BLD AUTO: 34.5 % (ref 35–45)
HGB BLD-MCNC: 10.2 G/DL (ref 12–16)
IMM GRANULOCYTES # BLD AUTO: 0 K/UL (ref 0–0.04)
IMM GRANULOCYTES NFR BLD AUTO: 1 % (ref 0–0.5)
LYMPHOCYTES # BLD: 1 K/UL (ref 0.9–3.6)
LYMPHOCYTES NFR BLD: 12 % (ref 21–52)
MAGNESIUM SERPL-MCNC: 1.8 MG/DL (ref 1.6–2.6)
MCH RBC QN AUTO: 26 PG (ref 24–34)
MCHC RBC AUTO-ENTMCNC: 29.6 G/DL (ref 31–37)
MCV RBC AUTO: 87.8 FL (ref 78–100)
MONOCYTES # BLD: 0.8 K/UL (ref 0.05–1.2)
MONOCYTES NFR BLD: 9 % (ref 3–10)
NEUTS SEG # BLD: 6.6 K/UL (ref 1.8–8)
NEUTS SEG NFR BLD: 79 % (ref 40–73)
NRBC # BLD: 0 K/UL (ref 0–0.01)
NRBC BLD-RTO: 0 PER 100 WBC
PLATELET # BLD AUTO: 239 K/UL (ref 135–420)
PMV BLD AUTO: 9.6 FL (ref 9.2–11.8)
POTASSIUM SERPL-SCNC: 4.7 MMOL/L (ref 3.5–5.5)
RBC # BLD AUTO: 3.93 M/UL (ref 4.2–5.3)
SODIUM SERPL-SCNC: 135 MMOL/L (ref 136–145)
WBC # BLD AUTO: 8.4 K/UL (ref 4.6–13.2)

## 2024-09-01 PROCEDURE — 6370000000 HC RX 637 (ALT 250 FOR IP): Performed by: PHYSICIAN ASSISTANT

## 2024-09-01 PROCEDURE — 6370000000 HC RX 637 (ALT 250 FOR IP): Performed by: THORACIC SURGERY (CARDIOTHORACIC VASCULAR SURGERY)

## 2024-09-01 PROCEDURE — 85025 COMPLETE CBC W/AUTO DIFF WBC: CPT

## 2024-09-01 PROCEDURE — 2700000000 HC OXYGEN THERAPY PER DAY

## 2024-09-01 PROCEDURE — 2000000000 HC ICU R&B

## 2024-09-01 PROCEDURE — 6360000002 HC RX W HCPCS: Performed by: PHYSICIAN ASSISTANT

## 2024-09-01 PROCEDURE — 94640 AIRWAY INHALATION TREATMENT: CPT

## 2024-09-01 PROCEDURE — 71045 X-RAY EXAM CHEST 1 VIEW: CPT

## 2024-09-01 PROCEDURE — 94761 N-INVAS EAR/PLS OXIMETRY MLT: CPT

## 2024-09-01 PROCEDURE — 99024 POSTOP FOLLOW-UP VISIT: CPT | Performed by: PHYSICIAN ASSISTANT

## 2024-09-01 PROCEDURE — 94669 MECHANICAL CHEST WALL OSCILL: CPT

## 2024-09-01 PROCEDURE — 2580000003 HC RX 258: Performed by: PHYSICIAN ASSISTANT

## 2024-09-01 PROCEDURE — 80048 BASIC METABOLIC PNL TOTAL CA: CPT

## 2024-09-01 PROCEDURE — 83735 ASSAY OF MAGNESIUM: CPT

## 2024-09-01 RX ORDER — FUROSEMIDE 10 MG/ML
20 INJECTION INTRAMUSCULAR; INTRAVENOUS ONCE
Status: DISCONTINUED | OUTPATIENT
Start: 2024-09-01 | End: 2024-09-01

## 2024-09-01 RX ORDER — ESCITALOPRAM OXALATE 10 MG/1
5 TABLET ORAL NIGHTLY
Status: DISCONTINUED | OUTPATIENT
Start: 2024-09-01 | End: 2024-09-03 | Stop reason: HOSPADM

## 2024-09-01 RX ORDER — FUROSEMIDE 10 MG/ML
20 INJECTION INTRAMUSCULAR; INTRAVENOUS EVERY 8 HOURS
Status: DISCONTINUED | OUTPATIENT
Start: 2024-09-01 | End: 2024-09-03 | Stop reason: HOSPADM

## 2024-09-01 RX ORDER — POLYETHYLENE GLYCOL 3350 17 G/17G
17 POWDER, FOR SOLUTION ORAL DAILY PRN
Status: DISCONTINUED | OUTPATIENT
Start: 2024-09-01 | End: 2024-09-03 | Stop reason: HOSPADM

## 2024-09-01 RX ORDER — SENNA AND DOCUSATE SODIUM 50; 8.6 MG/1; MG/1
1 TABLET, FILM COATED ORAL DAILY PRN
Status: DISCONTINUED | OUTPATIENT
Start: 2024-09-01 | End: 2024-09-03 | Stop reason: HOSPADM

## 2024-09-01 RX ORDER — BISACODYL 5 MG/1
5 TABLET, DELAYED RELEASE ORAL DAILY PRN
Status: DISCONTINUED | OUTPATIENT
Start: 2024-09-01 | End: 2024-09-03 | Stop reason: HOSPADM

## 2024-09-01 RX ADMIN — IPRATROPIUM BROMIDE AND ALBUTEROL SULFATE 1 DOSE: .5; 3 SOLUTION RESPIRATORY (INHALATION) at 16:51

## 2024-09-01 RX ADMIN — FAMOTIDINE 20 MG: 20 TABLET ORAL at 21:49

## 2024-09-01 RX ADMIN — ACETAMINOPHEN 1000 MG: 500 TABLET ORAL at 01:05

## 2024-09-01 RX ADMIN — METFORMIN HYDROCHLORIDE 500 MG: 500 TABLET ORAL at 17:23

## 2024-09-01 RX ADMIN — IPRATROPIUM BROMIDE AND ALBUTEROL SULFATE 1 DOSE: .5; 3 SOLUTION RESPIRATORY (INHALATION) at 08:19

## 2024-09-01 RX ADMIN — HEPARIN SODIUM 5000 UNITS: 5000 INJECTION INTRAVENOUS; SUBCUTANEOUS at 21:50

## 2024-09-01 RX ADMIN — LIOTHYRONINE SODIUM 25 MCG: 25 TABLET ORAL at 08:36

## 2024-09-01 RX ADMIN — METOPROLOL SUCCINATE 25 MG: 25 TABLET, EXTENDED RELEASE ORAL at 08:36

## 2024-09-01 RX ADMIN — IPRATROPIUM BROMIDE AND ALBUTEROL SULFATE 1 DOSE: .5; 3 SOLUTION RESPIRATORY (INHALATION) at 11:47

## 2024-09-01 RX ADMIN — METFORMIN HYDROCHLORIDE 500 MG: 500 TABLET ORAL at 08:36

## 2024-09-01 RX ADMIN — IPRATROPIUM BROMIDE AND ALBUTEROL SULFATE 1 DOSE: .5; 3 SOLUTION RESPIRATORY (INHALATION) at 22:23

## 2024-09-01 RX ADMIN — SODIUM CHLORIDE, PRESERVATIVE FREE 10 ML: 5 INJECTION INTRAVENOUS at 21:49

## 2024-09-01 RX ADMIN — ACETAMINOPHEN 1000 MG: 500 TABLET ORAL at 06:41

## 2024-09-01 RX ADMIN — FUROSEMIDE 20 MG: 10 INJECTION, SOLUTION INTRAMUSCULAR; INTRAVENOUS at 17:23

## 2024-09-01 RX ADMIN — FUROSEMIDE 20 MG: 10 INJECTION, SOLUTION INTRAMUSCULAR; INTRAVENOUS at 10:30

## 2024-09-01 RX ADMIN — ESCITALOPRAM 5 MG: 10 TABLET, FILM COATED ORAL at 21:49

## 2024-09-01 RX ADMIN — LEVOTHYROXINE SODIUM 50 MCG: 0.05 TABLET ORAL at 06:40

## 2024-09-01 RX ADMIN — SODIUM CHLORIDE, PRESERVATIVE FREE 10 ML: 5 INJECTION INTRAVENOUS at 08:37

## 2024-09-01 RX ADMIN — FAMOTIDINE 20 MG: 20 TABLET ORAL at 08:36

## 2024-09-01 RX ADMIN — ACETAMINOPHEN 1000 MG: 500 TABLET ORAL at 14:27

## 2024-09-01 RX ADMIN — HEPARIN SODIUM 5000 UNITS: 5000 INJECTION INTRAVENOUS; SUBCUTANEOUS at 06:40

## 2024-09-01 RX ADMIN — HEPARIN SODIUM 5000 UNITS: 5000 INJECTION INTRAVENOUS; SUBCUTANEOUS at 14:27

## 2024-09-01 RX ADMIN — ACETAMINOPHEN 1000 MG: 500 TABLET ORAL at 21:48

## 2024-09-01 ASSESSMENT — PAIN DESCRIPTION - PAIN TYPE
TYPE: ACUTE PAIN;SURGICAL PAIN
TYPE: ACUTE PAIN;SURGICAL PAIN
TYPE: ACUTE PAIN;SURGICAL PAIN;OTHER (COMMENT)

## 2024-09-01 ASSESSMENT — PAIN DESCRIPTION - FREQUENCY: FREQUENCY: CONTINUOUS

## 2024-09-01 ASSESSMENT — PAIN DESCRIPTION - ORIENTATION
ORIENTATION: RIGHT
ORIENTATION: RIGHT;MID
ORIENTATION: RIGHT;MID
ORIENTATION: RIGHT;POSTERIOR;MID

## 2024-09-01 ASSESSMENT — PAIN SCALES - GENERAL
PAINLEVEL_OUTOF10: 0
PAINLEVEL_OUTOF10: 3
PAINLEVEL_OUTOF10: 2
PAINLEVEL_OUTOF10: 0
PAINLEVEL_OUTOF10: 3
PAINLEVEL_OUTOF10: 7
PAINLEVEL_OUTOF10: 3
PAINLEVEL_OUTOF10: 0
PAINLEVEL_OUTOF10: 0
PAINLEVEL_OUTOF10: 4

## 2024-09-01 ASSESSMENT — PAIN DESCRIPTION - LOCATION
LOCATION: INCISION;RIB CAGE
LOCATION: CHEST
LOCATION: INCISION
LOCATION: INCISION

## 2024-09-01 ASSESSMENT — PAIN - FUNCTIONAL ASSESSMENT
PAIN_FUNCTIONAL_ASSESSMENT: ACTIVITIES ARE NOT PREVENTED
PAIN_FUNCTIONAL_ASSESSMENT: ACTIVITIES ARE NOT PREVENTED
PAIN_FUNCTIONAL_ASSESSMENT: PREVENTS OR INTERFERES SOME ACTIVE ACTIVITIES AND ADLS
PAIN_FUNCTIONAL_ASSESSMENT: ACTIVITIES ARE NOT PREVENTED

## 2024-09-01 ASSESSMENT — PAIN DESCRIPTION - DESCRIPTORS
DESCRIPTORS: JABBING;SORE
DESCRIPTORS: ACHING;CRAMPING;SORE;DISCOMFORT
DESCRIPTORS: SORE
DESCRIPTORS: SORE

## 2024-09-01 ASSESSMENT — PAIN DESCRIPTION - ONSET: ONSET: GRADUAL

## 2024-09-01 ASSESSMENT — PAIN SCALES - WONG BAKER: WONGBAKER_NUMERICALRESPONSE: HURTS A LITTLE BIT

## 2024-09-02 ENCOUNTER — APPOINTMENT (OUTPATIENT)
Facility: HOSPITAL | Age: 74
DRG: 165 | End: 2024-09-02
Attending: THORACIC SURGERY (CARDIOTHORACIC VASCULAR SURGERY)
Payer: MEDICARE

## 2024-09-02 LAB
ABO + RH BLD: NORMAL
ANION GAP SERPL CALC-SCNC: 4 MMOL/L (ref 3–18)
BASOPHILS # BLD: 0.1 K/UL (ref 0–0.1)
BASOPHILS NFR BLD: 1 % (ref 0–2)
BLD PROD TYP BPU: NORMAL
BLOOD BANK DISPENSE STATUS: NORMAL
BLOOD GROUP ANTIBODIES SERPL: NORMAL
BPU ID: NORMAL
BUN SERPL-MCNC: 17 MG/DL (ref 7–18)
BUN/CREAT SERPL: 25 (ref 12–20)
CALCIUM SERPL-MCNC: 8.9 MG/DL (ref 8.5–10.1)
CHLORIDE SERPL-SCNC: 94 MMOL/L (ref 100–111)
CO2 SERPL-SCNC: 36 MMOL/L (ref 21–32)
CREAT SERPL-MCNC: 0.69 MG/DL (ref 0.6–1.3)
CROSSMATCH RESULT: NORMAL
DIFFERENTIAL METHOD BLD: ABNORMAL
EOSINOPHIL # BLD: 0.1 K/UL (ref 0–0.4)
EOSINOPHIL NFR BLD: 2 % (ref 0–5)
ERYTHROCYTE [DISTWIDTH] IN BLOOD BY AUTOMATED COUNT: 13.2 % (ref 11.6–14.5)
GLUCOSE SERPL-MCNC: 125 MG/DL (ref 74–99)
HCT VFR BLD AUTO: 34.2 % (ref 35–45)
HGB BLD-MCNC: 10.4 G/DL (ref 12–16)
IMM GRANULOCYTES # BLD AUTO: 0 K/UL (ref 0–0.04)
IMM GRANULOCYTES NFR BLD AUTO: 0 % (ref 0–0.5)
LYMPHOCYTES # BLD: 1.3 K/UL (ref 0.9–3.6)
LYMPHOCYTES NFR BLD: 21 % (ref 21–52)
MAGNESIUM SERPL-MCNC: 1.7 MG/DL (ref 1.6–2.6)
MCH RBC QN AUTO: 26 PG (ref 24–34)
MCHC RBC AUTO-ENTMCNC: 30.4 G/DL (ref 31–37)
MCV RBC AUTO: 85.5 FL (ref 78–100)
MONOCYTES # BLD: 0.6 K/UL (ref 0.05–1.2)
MONOCYTES NFR BLD: 10 % (ref 3–10)
NEUTS SEG # BLD: 4.3 K/UL (ref 1.8–8)
NEUTS SEG NFR BLD: 67 % (ref 40–73)
NRBC # BLD: 0 K/UL (ref 0–0.01)
NRBC BLD-RTO: 0 PER 100 WBC
PLATELET # BLD AUTO: 255 K/UL (ref 135–420)
PMV BLD AUTO: 9 FL (ref 9.2–11.8)
POTASSIUM SERPL-SCNC: 3.7 MMOL/L (ref 3.5–5.5)
RBC # BLD AUTO: 4 M/UL (ref 4.2–5.3)
SODIUM SERPL-SCNC: 134 MMOL/L (ref 136–145)
SPECIMEN EXP DATE BLD: NORMAL
UNIT DIVISION: 0
WBC # BLD AUTO: 6.4 K/UL (ref 4.6–13.2)

## 2024-09-02 PROCEDURE — 2000000000 HC ICU R&B

## 2024-09-02 PROCEDURE — 80048 BASIC METABOLIC PNL TOTAL CA: CPT

## 2024-09-02 PROCEDURE — 94640 AIRWAY INHALATION TREATMENT: CPT

## 2024-09-02 PROCEDURE — 85025 COMPLETE CBC W/AUTO DIFF WBC: CPT

## 2024-09-02 PROCEDURE — 94669 MECHANICAL CHEST WALL OSCILL: CPT

## 2024-09-02 PROCEDURE — 71045 X-RAY EXAM CHEST 1 VIEW: CPT

## 2024-09-02 PROCEDURE — 2700000000 HC OXYGEN THERAPY PER DAY

## 2024-09-02 PROCEDURE — 6370000000 HC RX 637 (ALT 250 FOR IP): Performed by: PHYSICIAN ASSISTANT

## 2024-09-02 PROCEDURE — 2580000003 HC RX 258: Performed by: PHYSICIAN ASSISTANT

## 2024-09-02 PROCEDURE — 6370000000 HC RX 637 (ALT 250 FOR IP): Performed by: THORACIC SURGERY (CARDIOTHORACIC VASCULAR SURGERY)

## 2024-09-02 PROCEDURE — 94761 N-INVAS EAR/PLS OXIMETRY MLT: CPT

## 2024-09-02 PROCEDURE — 83735 ASSAY OF MAGNESIUM: CPT

## 2024-09-02 PROCEDURE — 6360000002 HC RX W HCPCS: Performed by: PHYSICIAN ASSISTANT

## 2024-09-02 RX ADMIN — HEPARIN SODIUM 5000 UNITS: 5000 INJECTION INTRAVENOUS; SUBCUTANEOUS at 14:00

## 2024-09-02 RX ADMIN — FAMOTIDINE 20 MG: 20 TABLET ORAL at 20:26

## 2024-09-02 RX ADMIN — ESCITALOPRAM 5 MG: 10 TABLET, FILM COATED ORAL at 20:27

## 2024-09-02 RX ADMIN — ACETAMINOPHEN 1000 MG: 500 TABLET ORAL at 08:25

## 2024-09-02 RX ADMIN — IPRATROPIUM BROMIDE AND ALBUTEROL SULFATE 1 DOSE: .5; 3 SOLUTION RESPIRATORY (INHALATION) at 12:29

## 2024-09-02 RX ADMIN — FUROSEMIDE 20 MG: 10 INJECTION, SOLUTION INTRAMUSCULAR; INTRAVENOUS at 02:04

## 2024-09-02 RX ADMIN — FUROSEMIDE 20 MG: 10 INJECTION, SOLUTION INTRAMUSCULAR; INTRAVENOUS at 17:32

## 2024-09-02 RX ADMIN — ACETAMINOPHEN 1000 MG: 500 TABLET ORAL at 02:03

## 2024-09-02 RX ADMIN — HEPARIN SODIUM 5000 UNITS: 5000 INJECTION INTRAVENOUS; SUBCUTANEOUS at 21:56

## 2024-09-02 RX ADMIN — ACETAMINOPHEN 1000 MG: 500 TABLET ORAL at 14:36

## 2024-09-02 RX ADMIN — METFORMIN HYDROCHLORIDE 500 MG: 500 TABLET ORAL at 08:25

## 2024-09-02 RX ADMIN — FUROSEMIDE 20 MG: 10 INJECTION, SOLUTION INTRAMUSCULAR; INTRAVENOUS at 08:25

## 2024-09-02 RX ADMIN — IPRATROPIUM BROMIDE AND ALBUTEROL SULFATE 1 DOSE: .5; 3 SOLUTION RESPIRATORY (INHALATION) at 16:26

## 2024-09-02 RX ADMIN — POTASSIUM CHLORIDE 40 MEQ: 1500 TABLET, EXTENDED RELEASE ORAL at 07:26

## 2024-09-02 RX ADMIN — IPRATROPIUM BROMIDE AND ALBUTEROL SULFATE 1 DOSE: .5; 3 SOLUTION RESPIRATORY (INHALATION) at 08:12

## 2024-09-02 RX ADMIN — HEPARIN SODIUM 5000 UNITS: 5000 INJECTION INTRAVENOUS; SUBCUTANEOUS at 07:26

## 2024-09-02 RX ADMIN — METOPROLOL SUCCINATE 25 MG: 25 TABLET, EXTENDED RELEASE ORAL at 08:25

## 2024-09-02 RX ADMIN — SODIUM CHLORIDE, PRESERVATIVE FREE 10 ML: 5 INJECTION INTRAVENOUS at 08:26

## 2024-09-02 RX ADMIN — METFORMIN HYDROCHLORIDE 500 MG: 500 TABLET ORAL at 17:32

## 2024-09-02 RX ADMIN — FAMOTIDINE 20 MG: 20 TABLET ORAL at 08:25

## 2024-09-02 RX ADMIN — LEVOTHYROXINE SODIUM 50 MCG: 0.05 TABLET ORAL at 07:26

## 2024-09-02 RX ADMIN — LIOTHYRONINE SODIUM 25 MCG: 25 TABLET ORAL at 08:25

## 2024-09-02 RX ADMIN — ACETAMINOPHEN 1000 MG: 500 TABLET ORAL at 21:54

## 2024-09-02 RX ADMIN — SODIUM CHLORIDE, PRESERVATIVE FREE 10 ML: 5 INJECTION INTRAVENOUS at 22:07

## 2024-09-02 ASSESSMENT — PAIN SCALES - GENERAL
PAINLEVEL_OUTOF10: 0
PAINLEVEL_OUTOF10: 3
PAINLEVEL_OUTOF10: 4
PAINLEVEL_OUTOF10: 4
PAINLEVEL_OUTOF10: 0
PAINLEVEL_OUTOF10: 0
PAINLEVEL_OUTOF10: 3

## 2024-09-02 ASSESSMENT — PAIN DESCRIPTION - ORIENTATION
ORIENTATION: RIGHT;POSTERIOR
ORIENTATION: RIGHT
ORIENTATION: RIGHT

## 2024-09-02 ASSESSMENT — PAIN DESCRIPTION - DESCRIPTORS
DESCRIPTORS: SORE;DISCOMFORT;ACHING
DESCRIPTORS: SORE
DESCRIPTORS: SHARP

## 2024-09-02 ASSESSMENT — PAIN - FUNCTIONAL ASSESSMENT
PAIN_FUNCTIONAL_ASSESSMENT: PREVENTS OR INTERFERES SOME ACTIVE ACTIVITIES AND ADLS
PAIN_FUNCTIONAL_ASSESSMENT: ACTIVITIES ARE NOT PREVENTED

## 2024-09-02 ASSESSMENT — PAIN SCALES - WONG BAKER: WONGBAKER_NUMERICALRESPONSE: HURTS A LITTLE BIT

## 2024-09-02 ASSESSMENT — PAIN DESCRIPTION - LOCATION
LOCATION: CHEST;BACK
LOCATION: CHEST
LOCATION: RIB CAGE;INCISION

## 2024-09-02 ASSESSMENT — PAIN DESCRIPTION - PAIN TYPE
TYPE: ACUTE PAIN;SURGICAL PAIN
TYPE: ACUTE PAIN;SURGICAL PAIN

## 2024-09-02 ASSESSMENT — PAIN DESCRIPTION - FREQUENCY
FREQUENCY: INTERMITTENT
FREQUENCY: INTERMITTENT

## 2024-09-02 ASSESSMENT — PAIN DESCRIPTION - ONSET: ONSET: SUDDEN

## 2024-09-03 ENCOUNTER — APPOINTMENT (OUTPATIENT)
Facility: HOSPITAL | Age: 74
DRG: 165 | End: 2024-09-03
Attending: THORACIC SURGERY (CARDIOTHORACIC VASCULAR SURGERY)
Payer: MEDICARE

## 2024-09-03 VITALS
BODY MASS INDEX: 34.59 KG/M2 | WEIGHT: 207.6 LBS | OXYGEN SATURATION: 97 % | HEIGHT: 65 IN | SYSTOLIC BLOOD PRESSURE: 136 MMHG | DIASTOLIC BLOOD PRESSURE: 70 MMHG | RESPIRATION RATE: 21 BRPM | TEMPERATURE: 97.6 F | HEART RATE: 97 BPM

## 2024-09-03 LAB
ANION GAP SERPL CALC-SCNC: 5 MMOL/L (ref 3–18)
BASOPHILS # BLD: 0 K/UL (ref 0–0.1)
BASOPHILS NFR BLD: 0 % (ref 0–2)
BUN SERPL-MCNC: 18 MG/DL (ref 7–18)
BUN/CREAT SERPL: 22 (ref 12–20)
CALCIUM SERPL-MCNC: 9.2 MG/DL (ref 8.5–10.1)
CHLORIDE SERPL-SCNC: 95 MMOL/L (ref 100–111)
CO2 SERPL-SCNC: 35 MMOL/L (ref 21–32)
CREAT SERPL-MCNC: 0.81 MG/DL (ref 0.6–1.3)
DIFFERENTIAL METHOD BLD: ABNORMAL
EOSINOPHIL # BLD: 0.2 K/UL (ref 0–0.4)
EOSINOPHIL NFR BLD: 2 % (ref 0–5)
ERYTHROCYTE [DISTWIDTH] IN BLOOD BY AUTOMATED COUNT: 13.7 % (ref 11.6–14.5)
GLUCOSE SERPL-MCNC: 138 MG/DL (ref 74–99)
HCT VFR BLD AUTO: 34.5 % (ref 35–45)
HGB BLD-MCNC: 10.9 G/DL (ref 12–16)
IMM GRANULOCYTES # BLD AUTO: 0.1 K/UL (ref 0–0.04)
IMM GRANULOCYTES NFR BLD AUTO: 1 % (ref 0–0.5)
LYMPHOCYTES # BLD: 1.7 K/UL (ref 0.9–3.6)
LYMPHOCYTES NFR BLD: 24 % (ref 21–52)
MAGNESIUM SERPL-MCNC: 1.7 MG/DL (ref 1.6–2.6)
MCH RBC QN AUTO: 26.9 PG (ref 24–34)
MCHC RBC AUTO-ENTMCNC: 31.6 G/DL (ref 31–37)
MCV RBC AUTO: 85.2 FL (ref 78–100)
MONOCYTES # BLD: 0.6 K/UL (ref 0.05–1.2)
MONOCYTES NFR BLD: 9 % (ref 3–10)
NEUTS SEG # BLD: 4.4 K/UL (ref 1.8–8)
NEUTS SEG NFR BLD: 63 % (ref 40–73)
NRBC # BLD: 0 K/UL (ref 0–0.01)
NRBC BLD-RTO: 0 PER 100 WBC
PLATELET # BLD AUTO: 288 K/UL (ref 135–420)
PMV BLD AUTO: 9.2 FL (ref 9.2–11.8)
POTASSIUM SERPL-SCNC: 3.3 MMOL/L (ref 3.5–5.5)
RBC # BLD AUTO: 4.05 M/UL (ref 4.2–5.3)
SODIUM SERPL-SCNC: 135 MMOL/L (ref 136–145)
WBC # BLD AUTO: 6.9 K/UL (ref 4.6–13.2)

## 2024-09-03 PROCEDURE — 85025 COMPLETE CBC W/AUTO DIFF WBC: CPT

## 2024-09-03 PROCEDURE — 6370000000 HC RX 637 (ALT 250 FOR IP): Performed by: THORACIC SURGERY (CARDIOTHORACIC VASCULAR SURGERY)

## 2024-09-03 PROCEDURE — 2580000003 HC RX 258: Performed by: PHYSICIAN ASSISTANT

## 2024-09-03 PROCEDURE — 94669 MECHANICAL CHEST WALL OSCILL: CPT

## 2024-09-03 PROCEDURE — 99024 POSTOP FOLLOW-UP VISIT: CPT | Performed by: PHYSICIAN ASSISTANT

## 2024-09-03 PROCEDURE — 97535 SELF CARE MNGMENT TRAINING: CPT

## 2024-09-03 PROCEDURE — 71045 X-RAY EXAM CHEST 1 VIEW: CPT

## 2024-09-03 PROCEDURE — 6360000002 HC RX W HCPCS: Performed by: PHYSICIAN ASSISTANT

## 2024-09-03 PROCEDURE — 80048 BASIC METABOLIC PNL TOTAL CA: CPT

## 2024-09-03 PROCEDURE — 97530 THERAPEUTIC ACTIVITIES: CPT

## 2024-09-03 PROCEDURE — 83735 ASSAY OF MAGNESIUM: CPT

## 2024-09-03 PROCEDURE — 94640 AIRWAY INHALATION TREATMENT: CPT

## 2024-09-03 PROCEDURE — APPNB180 APP NON BILLABLE TIME > 60 MINS: Performed by: PHYSICIAN ASSISTANT

## 2024-09-03 PROCEDURE — 94761 N-INVAS EAR/PLS OXIMETRY MLT: CPT

## 2024-09-03 PROCEDURE — 6370000000 HC RX 637 (ALT 250 FOR IP): Performed by: PHYSICIAN ASSISTANT

## 2024-09-03 RX ORDER — IPRATROPIUM BROMIDE AND ALBUTEROL SULFATE 2.5; .5 MG/3ML; MG/3ML
1 SOLUTION RESPIRATORY (INHALATION)
Status: DISCONTINUED | OUTPATIENT
Start: 2024-09-03 | End: 2024-09-03 | Stop reason: HOSPADM

## 2024-09-03 RX ADMIN — SODIUM CHLORIDE, PRESERVATIVE FREE 10 ML: 5 INJECTION INTRAVENOUS at 09:04

## 2024-09-03 RX ADMIN — ONDANSETRON 4 MG: 2 INJECTION INTRAMUSCULAR; INTRAVENOUS at 13:09

## 2024-09-03 RX ADMIN — ACETAMINOPHEN 1000 MG: 500 TABLET ORAL at 02:27

## 2024-09-03 RX ADMIN — POTASSIUM CHLORIDE 40 MEQ: 1500 TABLET, EXTENDED RELEASE ORAL at 07:27

## 2024-09-03 RX ADMIN — LIOTHYRONINE SODIUM 25 MCG: 25 TABLET ORAL at 09:03

## 2024-09-03 RX ADMIN — LEVOTHYROXINE SODIUM 50 MCG: 0.05 TABLET ORAL at 07:26

## 2024-09-03 RX ADMIN — IPRATROPIUM BROMIDE AND ALBUTEROL SULFATE 1 DOSE: .5; 3 SOLUTION RESPIRATORY (INHALATION) at 08:20

## 2024-09-03 RX ADMIN — FUROSEMIDE 20 MG: 10 INJECTION, SOLUTION INTRAMUSCULAR; INTRAVENOUS at 09:04

## 2024-09-03 RX ADMIN — FUROSEMIDE 20 MG: 10 INJECTION, SOLUTION INTRAMUSCULAR; INTRAVENOUS at 02:28

## 2024-09-03 RX ADMIN — IPRATROPIUM BROMIDE AND ALBUTEROL SULFATE 1 DOSE: .5; 3 SOLUTION RESPIRATORY (INHALATION) at 13:59

## 2024-09-03 RX ADMIN — METFORMIN HYDROCHLORIDE 500 MG: 500 TABLET ORAL at 09:04

## 2024-09-03 RX ADMIN — FAMOTIDINE 20 MG: 20 TABLET ORAL at 09:04

## 2024-09-03 RX ADMIN — ACETAMINOPHEN 1000 MG: 500 TABLET ORAL at 09:04

## 2024-09-03 RX ADMIN — METOPROLOL SUCCINATE 25 MG: 25 TABLET, EXTENDED RELEASE ORAL at 09:03

## 2024-09-03 ASSESSMENT — PAIN - FUNCTIONAL ASSESSMENT
PAIN_FUNCTIONAL_ASSESSMENT: ACTIVITIES ARE NOT PREVENTED

## 2024-09-03 ASSESSMENT — PAIN DESCRIPTION - DESCRIPTORS
DESCRIPTORS: ACHING
DESCRIPTORS: SORE
DESCRIPTORS: ACHING

## 2024-09-03 ASSESSMENT — PAIN DESCRIPTION - LOCATION
LOCATION: BACK
LOCATION: CHEST;BACK
LOCATION: CHEST;BACK

## 2024-09-03 ASSESSMENT — PAIN DESCRIPTION - ORIENTATION
ORIENTATION: RIGHT

## 2024-09-03 ASSESSMENT — PAIN DESCRIPTION - PAIN TYPE: TYPE: SURGICAL PAIN

## 2024-09-03 ASSESSMENT — PAIN SCALES - GENERAL
PAINLEVEL_OUTOF10: 3
PAINLEVEL_OUTOF10: 5
PAINLEVEL_OUTOF10: 3
PAINLEVEL_OUTOF10: 0
PAINLEVEL_OUTOF10: 0
PAINLEVEL_OUTOF10: 3
PAINLEVEL_OUTOF10: 0

## 2024-09-03 ASSESSMENT — PAIN DESCRIPTION - FREQUENCY: FREQUENCY: INTERMITTENT

## 2024-09-03 NOTE — CARE COORDINATION
Home health orders sent to multiple home health agencies in [facility].  Spoke with Capri of [facility] at [facility].  She stated she will call their office and let us know if they can accept.      Discharge order noted for today. Pt has been accepted to [facility]. Met with patient  and are agreeable to the transition plan today. Transport has been arranged through her . Patient's discharge summary and home health  orders have been forwarded to [facility]. Updated bedside RN, Rut,  to the transition plan.  Discharge information has been documented on the AVS.             TERRELL SalmonN RN  Care Management

## 2024-09-03 NOTE — DISCHARGE INSTRUCTIONS
11/28/23 Pap visit   Follow up with CT Surgery clinic in 10-14 days. Please obtain chest x-ray before the appointment.  Call [phone] with any questions or concerns regarding incisions.  Follow-up with PCP in 1 week.  Keep chest tube incision covered for 2 days after removal.  Okay to shower.  No creams or ointments on incisions.  No heavy lifting.  No airplane travel for 1 month.

## 2024-09-03 NOTE — PROGRESS NOTES
conducted a pre-surgery visit with Felicity Oquendo, who is a 74 y.o.,female.     The  provided the following Interventions:  Initiated a relationship of care and support support with patient before her surgery this morning for her lung surgery.  There is no advance directive on file and patient is not ready to do one. Patient does not have any connection at this time to a local Oriental orthodox or Scientologist group.  Offered prayers on patient's behalf.     Plan:  Chaplains will continue to follow and will provide pastoral care on an as needed/requested basis.   recommends bedside caregivers page  on duty if patient shows signs of acute spiritual or emotional distress.      Spiritual Health Assessment/Progress Note  [facility]    Pre-Op, Crisis (iv-sa-eje), Follow up,  ,      Name: Felicity Oquendo MRN: [id number]    Age: 74 y.o.     Sex: female   Language: English   Lutheran: Moravian   Malignant neoplasm of bronchus, right (HCC)     Date: 8/29/2024            Total Time Calculated: 7 min              Spiritual Assessment began in [facility] CARDIAC SURGERY        Referral/Consult From: Rounding   Encounter Overview/Reason: Pre-Op, Crisis (iv-sa-eje)  Service Provided For: Patient (new admit   pre-op  lung surgery.)    Alpa, Belief, Meaning:   Patient Other: no connection at this time to a Scientologist or spiritual group.  Family/Friends No family/friends present      Importance and Influence:  Patient has no beliefs influential to healthcare decision-making identified during this visit  Family/Friends no family/friends present    Community:  Patient feels well-supported. Support system includes: Spouse/Partner and Other: spouse  Family/Friends Other: no one present at this visit.    Assessment and Plan of Care:     Patient Interventions include: Affirmed coping skills/support systems  Family/Friends Interventions include: Other: no one present at this time.    Patient Plan of Care: Spiritual 
0700 - Bedside and Verbal shift change report given to writer (oncoming nurse) by Vane CROCKETT (offgoing nurse). Report included the following information Nurse Handoff Report, Adult Overview, Intake/Output, Recent Results, Cardiac Rhythm NSR, Alarm Parameters, Quality Measures, and Neuro Assessment.     0900 - PT at bedside.    1000 - A line and romero removed. Due to void by 1600.    1100 - OT at bedside.    1150 - Patient to [facility] to void, then walk in campuzano. Patient complaining of nausea upon returning to chair, patient vomited x 1, Zofran given.     1230 - patient back to bed after lunch.     1430 - provided patient with aerobika and IS, patient has good understanding of and able to demonstrate use.     1500 - patient up to toilet, voided 250, PVR 0 mL. Back to chair.     1730 - Patient walked in camp[place]ano with 4 wheel walker, tolerated well. Back to bed.       
0700: Bedside and Verbal shift change report given to BON Diallo (oncoming nurse) by BON Andersen (offgoing nurse). Report included the following information Nurse Handoff Report, Adult Overview, Surgery Report, Intake/Output, MAR, Recent Results, and Cardiac Rhythm SR .     0930: Dr Palumbo and JEANIE Lucas at bedside to see patient. Patient is drowsy from pain medication administered last night but arouses and is oriented x4.     1007: Assisted patient to bedside commode, patient tolerated well.     1255: Assisted patient to bedside commode, patient tolerated well.     1430: Patient is awake and more alert, sitting up in chair talking to family, attempting to do IS independently.     1655: Assisted patient to [facility] to urinate, patient tolerated well.     1732: Patient reports hallucinations, states \"I'm seeing smoke and an old man in the room\". JEANIE Lucas aware, telephone orders received.     1755: Assisted patient back to bed, patient tolerated well.     1900: Bedside and Verbal shift change report given to BON Pickens (oncoming nurse) by BON Diallo (offgoing nurse). Report included the following information Nurse Handoff Report, Index, Adult Overview, Surgery Report, Intake/Output, MAR, Recent Results, and Cardiac Rhythm SR-ST .     
0700: Bedside and Verbal shift change report given to BON Diallo (oncoming nurse) by BON Biswas (offgoing nurse). Report included the following information Nurse Handoff Report, Adult Overview, Intake/Output, MAR, Recent Results, and Cardiac Rhythm SR-ST .     0910: Dr Mccarty at bedside to see patient.     1003: Assisted patient to ambulate in the hallway, tolerated well, slight WALLS noted, patient recovered quickly.     1200: Patient sitting in recliner with family at bedside, denies complaints at this time.     1305: Assisted patient to ambulate in the room to the bathroom, patient had a large BM, then assisted patient back to recliner, patient tolerated well.     1315: CT atrium full, changed.     1442: Assisted patient to ambulate in hallway, slight WALLS noted, patient recovered quickly.     1720: Assisted patient to ambulate in the hallway, then to the bathroom, then returned to bed. Patient tolerated well, slight WALLS noted, patient recovered quickly.     1900: Bedside and Verbal shift change report given to BON Andersen (oncoming nurse) by BON Diallo (offgoing nurse). Report included the following information Nurse Handoff Report, Adult Overview, Intake/Output, MAR, Recent Results, and Cardiac Rhythm SR-ST .     
0700: Bedside and Verbal shift change report given to BON Diallo (oncoming nurse) by BON Pickens (offgoing nurse). Report included the following information Nurse Handoff Report, Adult Overview, Surgery Report, Intake/Output, MAR, Recent Results, and Cardiac Rhythm SR .     0730: Assisted patient to BSC, patient had a BM. Patient is more alert this morning and tolerated ambulation in room well.     0840:  at bedside, happy to see patient is awake, alert and conversing this morning.     1015: JEANIE Lucas at bedside to see patient.     1127: Assisted patient to ambulate to the bathroom, in the hallway in the unit, then returned to bed. Patient tolerated well.     1210: After MetaNeb patient had coughing and stress incontinence, cleaned patient up, patient tolerated well.  at bedside and assisted with patient care.     1338: Assisted patient OOB to bathroom then to recliner, patient tolerated well.     1455: Assisted patient to ambulate in the room to the bathroom, patient had another BM, then back to recliner, patient tolerated well.     1700: Patient sitting in recliner,  at bedside assisting patient with her dinner.     1740: Assisted patient to ambulate in room to bathroom and then to bed, patient tolerated well. Purwick placed for stress incontinence with coughing.     1900: Bedside and Verbal shift change report given to BON Biswas (oncoming nurse) by BON Diallo (offgoing nurse). Report included the following information Nurse Handoff Report, Adult Overview, Intake/Output, MAR, Recent Results, and Cardiac Rhythm SR-ST .     
0745 - up to toilet, + BM, + large void (missed hat). Patient walked CVT ICU/SD \"loop,\" tolerated well. Back to chair.     0845 - Patient walked CVT ICU/SD \"loop\" with OT, tolerated well. Back to chair.     0930 - up to toilet, + void (missed hat), back to chair.     1115 -  up to toilet, +BM, +void (missed hat), back to chair.     1130 - CT to water seal.     1300 - up to toilet. Patient complaining of nausea without vomiting. Zofran given.     1615 - Discharge order received. Patient and her spouse agreeable to D/C home today. Discharge instructions reviewed with patient and spouse, opportunities for questions given and all questions answered. Patient and spouse verbalize understanding of discharge instructions. PIV removed and patient transported via wheelchair to vehicle.   
1920-Bedside and Verbal shift change report given to this writer (oncoming nurse) by Rut CROCKETT (offgoing nurse). Report included the following information Nurse Handoff Report, Adult Overview, Surgery Report, Intake/Output, Recent Results, Cardiac Rhythm  , Neuro Assessment, and Event Log.   Wound Prevention Checklist    Patient: Felicity Oquendo (74 y.o. female)  Date: 8/30/2024  Diagnosis: Malignant neoplasm of bronchus, right (HCC) [C34.91]  Mass of upper lobe of right lung [R91.8] Malignant neoplasm of bronchus, right (HCC)    Precautions:         []  Heel prevention boots placed on patient    []  Patient turned q2h during shift    []  Lift team ordered    [x]  Patient on Encinal bed/Specialty bed    [x]  Each Wound is documented during shift (Stage, Color, drainage, odor, measurements, and dressings)    [x]  Dual skin check done with Rut Ellison RN   9199-Bed side shift report with Rut CROCKETT     
CARDIOTHORACIC SURGERY PROGRESS NOTE    2024,  10:04 AM     Post Operative Day # 2     Interval History/Events of Past 24 hours:   Ambulating, Good BP, UOP and copious . 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    Stephanie Elder PA-C  ======================================================================================================================================    Chart reviewed.      Subjective:  Patient seen and examined on rounds today.  Pain level:0  Ambulating: well  Sleeping: well  Eating:  well    BM: No    Objective:  Vital signs:   BP (!) 97/50   Pulse 89   Temp 98.2 °F (36.8 °C)   Resp 15   Ht 1.651 m (5' 5\")   Wt 90.4 kg (199 lb 4.8 oz)   SpO2 99%   BMI 33.17 kg/m²   Temp (24hrs), Av.1 °F (36.7 °C), Min:97.4 °F (36.3 °C), Max:98.5 °F (36.9 °C)    Admission Weight: Last Weight   Weight - Scale: 97.4 kg (214 lb 12.8 oz) Weight - Scale: 90.4 kg (199 lb 4.8 oz)        Physical Examination:     General:  Alert, oriented  Lungs: Clear to ascultation without rales, wheezes or rhonchi.    Incisions: clean and dry  Heart: regular rate and rhythm, No murmur.  Abdomen: Soft and non-tender without masses. Bowel sounds present.  Extremities: Warm and well perfused. Edema mild.    Neuro: No deficit.    Epidural:  No    DVT Prophylaxis:   pneumatic compression boots: Yes  Compression stockings: Yes  Heparin:  Yes    Chest tubes: Air leak:not present                         Drainage:  650cc                         Suction:  20cm H2O    DME needs:  HH          Labs:  Lab Results   Component Value Date/Time    WBC 12.1 
CARDIOTHORACIC SURGERY PROGRESS NOTE    2024,  10:29 AM     Post Operative Day # 3     Interval History/Events of Past 24 hours:   Hallucinating, unable to fully participate in PT/OT. This AM OOB, more awake. Good BP, UOP, mod . CXR little change, 2L NC, BM.     Assessment:  S/P right U Lobectomy and MLND for Squamous cell Ca  COPD on nebs and PEP  DM on home meds  HTN on home meds  HLD on home meds  Hypothyroidism on home meds  Anxiety on home meds  Respiratory parameters stable  Cardiac status stable    Plans:  No narcotics, pain tx with tylenol  Lasix  Increase RT, add metaneb  Keep CT/cordis  Increase PT/OT/IS  Possible discharge 2 days.    Stephanie Elder PA-C  ======================================================================================================================================    Chart reviewed.      Subjective:  Patient seen and examined on rounds today.  Pain level:4  Ambulating: well  Sleeping: well  Eating:  well    BM: Yes    Objective:  Vital signs:   BP (!) 126/58   Pulse 96   Temp 98 °F (36.7 °C) (Oral)   Resp 16   Ht 1.651 m (5' 5\")   Wt 96.8 kg (213 lb 4.8 oz)   SpO2 100%   BMI 35.49 kg/m²   Temp (24hrs), Av.1 °F (36.7 °C), Min:97.5 °F (36.4 °C), Max:98.4 °F (36.9 °C)    Admission Weight: Last Weight   Weight - Scale: 97.4 kg (214 lb 12.8 oz) Weight - Scale: 96.8 kg (213 lb 4.8 oz)        Physical Examination:     General:  Alert, oriented  Lungs: Clear to ascultation without rales, wheezes or rhonchi.    Incisions: clean and dry  Heart: regular rate and rhythm, No murmur.  Abdomen: Soft and non-tender without masses. Bowel sounds present.  Extremities: Warm and well perfused. Edema mild.    Neuro: No deficit.    Epidural:  Yes    DVT Prophylaxis:   pneumatic compression boots: Yes  Compression stockings: Yes  Heparin:  Yes    Chest tubes: Air leak:not present                         Drainage:  320cc                         Suction:   20cm H2O    DME needs:  
CARDIOTHORACIC SURGERY PROGRESS NOTE    2024,  10:40 AM     Post Operative Day # 1     Interval History/Events of Past 24 hours:   Transferred to ICU extubated and stable. Required no IVD and pain well controlled. Home meds restarted. Good UOP, good BP, OOB/ambulating this AM    Assessment:  S/P right U Lobectomy and MLND for Squamous cell Ca  Respiratory parameters stable  Cardiac status stable    Plans:  Add PEP/RT  DC Chanetll/Garcia  Keep CT/cordis  Increase PT/OT/IS  Possible discharge 3 days.    Stephanie Elder PA-C  ======================================================================================================================================    Chart reviewed.      Subjective:  Patient seen and examined on rounds today.  Pain level:6  Ambulating: well  Sleeping: well  Eating:  well    BM: No    Objective:  Vital signs:   /61   Pulse 75   Temp 98 °F (36.7 °C) (Oral)   Resp 23   Ht 1.651 m (5' 5\")   Wt 97.5 kg (215 lb)   SpO2 95%   BMI 35.78 kg/m²   Temp (24hrs), Av °F (36.7 °C), Min:97.4 °F (36.3 °C), Max:98.4 °F (36.9 °C)    Admission Weight: Last Weight   Weight - Scale: 97.4 kg (214 lb 12.8 oz) Weight - Scale: 97.5 kg (215 lb)        Physical Examination:     General:  Alert, oriented  Lungs: Clear to ascultation without rales, wheezes or rhonchi.    Incisions: clean and dry  Heart: regular rate and rhythm, No murmur.  Abdomen: Soft and non-tender without masses. Bowel sounds present.  Extremities: Warm and well perfused. Edema mild.    Neuro: No deficit.    Epidural:  No    DVT Prophylaxis:   pneumatic compression boots: Yes  Compression stockings: Yes  Heparin:  Yes    Chest tubes: Air leak:not present                         Drainage:  200cc                         Suction:   20cm H2O    DME needs:  HH          Labs:  Lab Results   Component Value Date/Time    WBC 14.0 (H) 2024 03:30 AM    HCT 38.2 2024 03:30 AM     2024 03:30 AM      Lab Results 
No complaints   Sinus rhythm stable hemodynamics  Equal bilateral breath sounds   Abdomen soft non tender   + edema  Chest xray mild congestion low lung volume  Stable H&H and  cr   Moderate chest tube output, no air leak   A/P  Stable doing well  Encourage deep breathing exercises  Physical therapy   Continue chest tube to suction    Continue diuretics     
09/03/2024 03:10 AM      Lab Results   Component Value Date/Time     (L) 09/03/2024 03:10 AM    K 3.3 (L) 09/03/2024 03:10 AM    CL 95 (L) 09/03/2024 03:10 AM    CO2 35 (H) 09/03/2024 03:10 AM    BUN 18 09/03/2024 03:10 AM       MICRO/PATHology:  Awaiting final        CXR:   IMPRESSION:     Similar appearance of postsurgical changes in the right hemithorax status post  right upper lobectomy and mediastinal lymph node dissection.  No new or acute findings.  Stable support devices as above without pneumothorax.

## 2024-09-03 NOTE — PLAN OF CARE
Problem: Chronic Conditions and Co-morbidities  Goal: Patient's chronic conditions and co-morbidity symptoms are monitored and maintained or improved  Outcome: Progressing     Problem: Safety - Adult  Goal: Free from fall injury  Outcome: Progressing     Problem: Pain  Goal: Verbalizes/displays adequate comfort level or baseline comfort level  Outcome: Progressing     Problem: Discharge Planning  Goal: Discharge to home or other facility with appropriate resources  Outcome: Progressing     Problem: ABCDS Injury Assessment  Goal: Absence of physical injury  Outcome: Progressing     Problem: Respiratory - Adult  Goal: Achieves optimal ventilation and oxygenation  Outcome: Progressing     Problem: Skin/Tissue Integrity - Adult  Goal: Skin integrity remains intact  Outcome: Progressing  Goal: Incisions, wounds, or drain sites healing without S/S of infection  Outcome: Progressing     Problem: Musculoskeletal - Adult  Goal: Return mobility to safest level of function  Outcome: Progressing  Goal: Return ADL status to a safe level of function  Outcome: Progressing     Problem: Gastrointestinal - Adult  Goal: Minimal or absence of nausea and vomiting  Outcome: Progressing  Goal: Maintains or returns to baseline bowel function  Outcome: Progressing  Goal: Maintains adequate nutritional intake  Outcome: Progressing     Problem: Genitourinary - Adult  Goal: Absence of urinary retention  Outcome: Progressing     Problem: Infection - Adult  Goal: Absence of infection at discharge  Outcome: Progressing  Goal: Absence of infection during hospitalization  Outcome: Progressing     Problem: Metabolic/Fluid and Electrolytes - Adult  Goal: Electrolytes maintained within normal limits  Outcome: Progressing     Problem: Hematologic - Adult  Goal: Maintains hematologic stability  Outcome: Progressing     
  Problem: Chronic Conditions and Co-morbidities  Goal: Patient's chronic conditions and co-morbidity symptoms are monitored and maintained or improved  Outcome: Progressing  Flowsheets (Taken 8/30/2024 0800)  Care Plan - Patient's Chronic Conditions and Co-Morbidity Symptoms are Monitored and Maintained or Improved:   Monitor and assess patient's chronic conditions and comorbid symptoms for stability, deterioration, or improvement   Collaborate with multidisciplinary team to address chronic and comorbid conditions and prevent exacerbation or deterioration     Problem: Safety - Adult  Goal: Free from fall injury  Outcome: Progressing     Problem: Pain  Goal: Verbalizes/displays adequate comfort level or baseline comfort level  8/30/2024 1049 by Rut Barton, RN  Outcome: Progressing  Flowsheets (Taken 8/30/2024 0800)  Verbalizes/displays adequate comfort level or baseline comfort level:   Encourage patient to monitor pain and request assistance   Assess pain using appropriate pain scale   Administer analgesics based on type and severity of pain and evaluate response   Implement non-pharmacological measures as appropriate and evaluate response  8/30/2024 0145 by Taylor Ellison RN  Outcome: Progressing     Problem: Discharge Planning  Goal: Discharge to home or other facility with appropriate resources  Outcome: Progressing  Flowsheets (Taken 8/30/2024 0800)  Discharge to home or other facility with appropriate resources:   Identify barriers to discharge with patient and caregiver   Arrange for needed discharge resources and transportation as appropriate   Identify discharge learning needs (meds, wound care, etc)     Problem: ABCDS Injury Assessment  Goal: Absence of physical injury  8/30/2024 1049 by Rut Barton, RN  Outcome: Progressing  8/30/2024 0145 by Taylor Ellison RN  Outcome: Progressing     Problem: Respiratory - Adult  Goal: Achieves optimal ventilation and oxygenation  8/30/2024 1049 by Rut Barton, 
  Problem: Chronic Conditions and Co-morbidities  Goal: Patient's chronic conditions and co-morbidity symptoms are monitored and maintained or improved  Outcome: Progressing  Flowsheets (Taken 8/30/2024 2000)  Care Plan - Patient's Chronic Conditions and Co-Morbidity Symptoms are Monitored and Maintained or Improved:   Monitor and assess patient's chronic conditions and comorbid symptoms for stability, deterioration, or improvement   Collaborate with multidisciplinary team to address chronic and comorbid conditions and prevent exacerbation or deterioration   Update acute care plan with appropriate goals if chronic or comorbid symptoms are exacerbated and prevent overall improvement and discharge     Problem: Safety - Adult  Goal: Free from fall injury  Outcome: Progressing     Problem: Pain  Goal: Verbalizes/displays adequate comfort level or baseline comfort level  Outcome: Progressing     
  Problem: Occupational Therapy - Adult  Goal: By Discharge: Performs self-care activities at highest level of function for planned discharge setting.  See evaluation for individualized goals.  Description: Occupational Therapy Goals:  Initiated 8/30/2024 to be met within 7-10 days.    1.  Patient will perform grooming with supervision/set-up while standing at the sink for > 2 min with Good balance.   2.  Patient will perform bathing with supervision/set-up.  3.  Patient will perform lower body dressing with supervision/set-up.  4.  Patient will perform toilet transfers with supervision/set-up.  5.  Patient will perform all aspects of toileting with supervision/set-up.  6.  Patient will participate in upper extremity therapeutic exercise/activities with supervision/set-up for 8 minutes to improve endurance and UB strength needed for ADLs    7.  Patient will utilize energy conservation techniques during functional activities with verbal cues.    PLOF: Pt lives with spouse, independent.    Outcome: Progressing  OCCUPATIONAL THERAPY EVALUATION    Patient: Felicity Oquendo (74 y.o. female)  Date: 8/30/2024  Primary Diagnosis: Malignant neoplasm of bronchus, right (HCC) [C34.91]  Mass of upper lobe of right lung [R91.8]  Procedure(s) (LRB):  MINI THORACOTOMY , RIGHT UPPER LOBECTOMY WITH FROZEN SECTION, MEDIASTINAL LYMPHNODE DISECTION (Right) 1 Day Post-Op   Precautions: Fall Risk, General Precautions    ASSESSMENT :    Pt in recliner, supportive spouse present. Pt reports pain/discomfort in R side (chest tube site) with RUE AROM and overhead activities. CGA for socks management using crossed LE technique with pt c/o pain exacerbated with exertion. Pt requesting education on AE for shoes management and bathing. Pt issued long handled shoe horn, sock aid, long handled sponge and educated on use. Pt demod good understanding. CGA for functional txfrs with HHA due to decreased balance.   DEFICITS/IMPAIRMENTS:  Performance 
  Problem: Pain  Goal: Verbalizes/displays adequate comfort level or baseline comfort level  8/30/2024 0145 by Taylor Ellison RN  Outcome: Progressing  8/29/2024 1541 by Rut Barton RN  Outcome: Progressing     Problem: ABCDS Injury Assessment  Goal: Absence of physical injury  8/30/2024 0145 by Taylor Ellison RN  Outcome: Progressing  8/29/2024 1541 by Rut Barton RN  Outcome: Progressing     Problem: Respiratory - Adult  Goal: Achieves optimal ventilation and oxygenation  8/30/2024 0145 by Taylor Ellison RN  Outcome: Progressing  8/29/2024 1541 by Rut Barton RN  Outcome: Progressing     
  Problem: Physical Therapy - Adult  Goal: By Discharge: Performs mobility at highest level of function for planned discharge setting.  See evaluation for individualized goals.  Description: Initiated  8/30/24  to be met within 7-10 days.    1.  Patient will move from supine to sit and sit to supine , scoot up and down, and roll side to side in bed with modified independence.    2.  Patient will transfer from bed to chair and chair to bed with modified independence using the least restrictive device.  3.  Patient will perform sit to stand with modified independence.  4.  Patient will ambulate with modified independence for 75 feet with the least restrictive device for improved safety with home ambulation.       PLOF: Pt lives with  in two story condo, able to stay on main level.  Pt was independent with all mobility, has RW at home.     Outcome: Progressing     PHYSICAL THERAPY EVALUATION    Patient: Felicity Oquendo (74 y.o. female)  Date: 8/30/2024  Primary Diagnosis: Malignant neoplasm of bronchus, right (HCC) [C34.91]  Mass of upper lobe of right lung [R91.8]  Procedure(s) (LRB):  MINI THORACOTOMY , RIGHT UPPER LOBECTOMY WITH FROZEN SECTION, MEDIASTINAL LYMPHNODE DISECTION (Right) 1 Day Post-Op   Precautions: Fall Risk, General Precautions,  ,  ,  ,  ,  ,  ,      ASSESSMENT :  Pt sitting up in recliner upon entering room, cleared by RN to see, agreeable to PT evaluation.  Pt with good b/l LE strength, STS with RW and CGA.  Pt denied any further dizziness with standing, BP and O2 sats stable.  Pt agreeable to ambulate around room with RW and CGA and assistance for line and tube/drain management.  Pt with shuffling gait, no LOB but did begin to note increase in dizziness upon returning to recliner.  Pt stayed up in recliner upon finishing treatment session, eating breakfast,  present and call hadley by her side.  Educated pt and  on need to call RN when wanting to get back to bed, verbalized 
Return ADL status to a safe level of function  Outcome: Progressing     Problem: Gastrointestinal - Adult  Goal: Minimal or absence of nausea and vomiting  Outcome: Progressing  Flowsheets (Taken 8/31/2024 2000)  Minimal or absence of nausea and vomiting:   Administer ordered antiemetic medications as needed   Provide nonpharmacologic comfort measures as appropriate  Goal: Maintains or returns to baseline bowel function  Outcome: Progressing  Flowsheets (Taken 8/31/2024 2000)  Maintains or returns to baseline bowel function:   Assess bowel function   Administer ordered medications as needed   Encourage mobilization and activity  Goal: Maintains adequate nutritional intake  Outcome: Progressing  Flowsheets (Taken 8/31/2024 2000)  Maintains adequate nutritional intake:   Monitor percentage of each meal consumed   Identify factors contributing to decreased intake, treat as appropriate   Assist with meals as needed   Monitor intake and output, weight and lab values     Problem: Genitourinary - Adult  Goal: Absence of urinary retention  Outcome: Progressing  Flowsheets (Taken 8/31/2024 2000)  Absence of urinary retention:   Assess patient’s ability to void and empty bladder   Monitor intake/output and perform bladder scan as needed     Problem: Infection - Adult  Goal: Absence of infection at discharge  Outcome: Progressing  Flowsheets (Taken 8/31/2024 2000)  Absence of infection at discharge:   Assess and monitor for signs and symptoms of infection   Monitor lab/diagnostic results   Monitor all insertion sites i.e., indwelling lines, tubes and drains  Goal: Absence of infection during hospitalization  Outcome: Progressing  Flowsheets (Taken 8/31/2024 2000)  Absence of infection during hospitalization:   Assess and monitor for signs and symptoms of infection   Monitor lab/diagnostic results   Monitor all insertion sites i.e., indwelling lines, tubes and drains     Problem: Metabolic/Fluid and Electrolytes - Adult  Goal: 
distress in bed  [x]  Call bell left within reach  [x]  Nursing notified  []  Caregiver present  []  Bed alarm activated    COMMUNICATION/EDUCATION:   Patient Education  Education Given To: Patient  Education Provided: Role of Therapy;Transfer Training;Energy Conservation;Fall Prevention Strategies;ADL Adaptive Strategies;Plan of Care  Education Method: Verbal;Demonstration;Teach Back  Barriers to Learning: None  Education Outcome: Verbalized understanding;Continued education needed      Thank you for this referral.  THERESA Hurley  Minutes: 23  
Encourage mobilization and activity  Taken 9/2/2024 2000 by Nathaly Murphy RN  Maintains or returns to baseline bowel function:   Assess bowel function   Encourage oral fluids to ensure adequate hydration   Administer IV fluids as ordered to ensure adequate hydration   Administer ordered medications as needed   Encourage mobilization and activity  Goal: Maintains adequate nutritional intake  Outcome: Progressing  Flowsheets  Taken 9/3/2024 0800 by Rut Barton RN  Maintains adequate nutritional intake:   Monitor percentage of each meal consumed   Monitor intake and output, weight and lab values  Taken 9/2/2024 2000 by Nathaly Murphy RN  Maintains adequate nutritional intake:   Monitor percentage of each meal consumed   Identify factors contributing to decreased intake, treat as appropriate   Monitor intake and output, weight and lab values     Problem: Genitourinary - Adult  Goal: Absence of urinary retention  Outcome: Progressing  Flowsheets (Taken 9/3/2024 0800)  Absence of urinary retention: Assess patient’s ability to void and empty bladder     Problem: Infection - Adult  Goal: Absence of infection at discharge  Outcome: Progressing  Flowsheets  Taken 9/3/2024 0800 by Rut Barton RN  Absence of infection at discharge:   Assess and monitor for signs and symptoms of infection   Monitor lab/diagnostic results   Monitor all insertion sites i.e., indwelling lines, tubes and drains  Taken 9/2/2024 2000 by Nathaly Murphy RN  Absence of infection at discharge:   Assess and monitor for signs and symptoms of infection   Monitor lab/diagnostic results   Monitor all insertion sites i.e., indwelling lines, tubes and drains   Administer medications as ordered   Instruct and encourage patient and family to use good hand hygiene technique  Goal: Absence of infection during hospitalization  Outcome: Progressing  Flowsheets  Taken 9/3/2024 0800 by Rut Barton RN  Absence of infection during hospitalization:   
ordered   Monitor response to electrolyte replacements, including repeat lab results as appropriate  Taken 9/2/2024 2000 by Nathaly Murphy RN  Electrolytes maintained within normal limits:   Monitor labs and assess patient for signs and symptoms of electrolyte imbalances   Administer electrolyte replacement as ordered   Monitor response to electrolyte replacements, including repeat lab results as appropriate   Fluid restriction as ordered   Instruct patient on fluid and nutrition restrictions as appropriate     Problem: Hematologic - Adult  Goal: Maintains hematologic stability  9/3/2024 0814 by Rut Barton RN  Outcome: Progressing  Flowsheets  Taken 9/3/2024 0800 by Rut Barton, RN  Maintains hematologic stability:   Assess for signs and symptoms of bleeding or hemorrhage   Monitor labs for bleeding or clotting disorders  Taken 9/2/2024 2000 by Nathaly Murphy RN  Maintains hematologic stability:   Assess for signs and symptoms of bleeding or hemorrhage   Monitor labs for bleeding or clotting disorders   Administer blood products/factors as ordered

## 2024-09-13 DIAGNOSIS — Z98.890 S/P THORACOTOMY: Primary | ICD-10-CM

## 2024-09-13 LAB
Lab: NORMAL
Lab: NORMAL
REFERENCE LAB: NORMAL

## 2024-09-16 ENCOUNTER — HOSPITAL ENCOUNTER (OUTPATIENT)
Facility: HOSPITAL | Age: 74
Discharge: HOME | End: 2024-09-19
Payer: MEDICARE

## 2024-09-16 ENCOUNTER — OFFICE VISIT (OUTPATIENT)
Dept: CARDIOTHORACIC SURGERY | Age: 74
End: 2024-09-16

## 2024-09-16 VITALS
HEIGHT: 65 IN | WEIGHT: 215 LBS | BODY MASS INDEX: 35.82 KG/M2 | OXYGEN SATURATION: 98 % | DIASTOLIC BLOOD PRESSURE: 84 MMHG | HEART RATE: 65 BPM | SYSTOLIC BLOOD PRESSURE: 128 MMHG

## 2024-09-16 DIAGNOSIS — Z98.890 S/P THORACOTOMY: ICD-10-CM

## 2024-09-16 DIAGNOSIS — Z98.890 S/P THORACOTOMY: Primary | ICD-10-CM

## 2024-09-16 DIAGNOSIS — C34.91 SQUAMOUS CELL CARCINOMA OF RIGHT LUNG (HCC): ICD-10-CM

## 2024-09-16 PROCEDURE — 71046 X-RAY EXAM CHEST 2 VIEWS: CPT

## 2024-09-16 PROCEDURE — 99024 POSTOP FOLLOW-UP VISIT: CPT | Performed by: PHYSICIAN ASSISTANT

## 2024-09-17 ENCOUNTER — TELEPHONE (OUTPATIENT)
Dept: CARDIOTHORACIC SURGERY | Age: 74
End: 2024-09-17

## 2024-10-14 ENCOUNTER — PROCEDURE VISIT (OUTPATIENT)
Age: 74
End: 2024-10-14
Payer: MEDICARE

## 2024-10-14 VITALS — BODY MASS INDEX: 35.65 KG/M2 | HEIGHT: 65 IN | WEIGHT: 214 LBS

## 2024-10-14 DIAGNOSIS — R06.02 SOB (SHORTNESS OF BREATH): Primary | ICD-10-CM

## 2024-10-14 PROCEDURE — 94060 EVALUATION OF WHEEZING: CPT | Performed by: INTERNAL MEDICINE

## 2024-10-14 PROCEDURE — 94729 DIFFUSING CAPACITY: CPT | Performed by: INTERNAL MEDICINE

## 2024-10-14 PROCEDURE — 94727 GAS DIL/WSHOT DETER LNG VOL: CPT | Performed by: INTERNAL MEDICINE

## 2024-10-16 ENCOUNTER — OFFICE VISIT (OUTPATIENT)
Age: 74
End: 2024-10-16
Payer: MEDICARE

## 2024-10-16 VITALS
RESPIRATION RATE: 16 BRPM | WEIGHT: 216.2 LBS | DIASTOLIC BLOOD PRESSURE: 61 MMHG | OXYGEN SATURATION: 96 % | HEART RATE: 77 BPM | TEMPERATURE: 98.4 F | SYSTOLIC BLOOD PRESSURE: 148 MMHG | BODY MASS INDEX: 35.98 KG/M2

## 2024-10-16 DIAGNOSIS — Z87.891 FORMER SMOKER: ICD-10-CM

## 2024-10-16 DIAGNOSIS — J44.9 COPD, SEVERE (HCC): ICD-10-CM

## 2024-10-16 DIAGNOSIS — Z90.2 S/P LOBECTOMY OF LUNG: ICD-10-CM

## 2024-10-16 DIAGNOSIS — C34.91 NON-SMALL CELL CARCINOMA OF LUNG, RIGHT (HCC): Primary | ICD-10-CM

## 2024-10-16 PROCEDURE — G8427 DOCREV CUR MEDS BY ELIG CLIN: HCPCS | Performed by: INTERNAL MEDICINE

## 2024-10-16 PROCEDURE — 3017F COLORECTAL CA SCREEN DOC REV: CPT | Performed by: INTERNAL MEDICINE

## 2024-10-16 PROCEDURE — 1090F PRES/ABSN URINE INCON ASSESS: CPT | Performed by: INTERNAL MEDICINE

## 2024-10-16 PROCEDURE — 1036F TOBACCO NON-USER: CPT | Performed by: INTERNAL MEDICINE

## 2024-10-16 PROCEDURE — 1123F ACP DISCUSS/DSCN MKR DOCD: CPT | Performed by: INTERNAL MEDICINE

## 2024-10-16 PROCEDURE — G8417 CALC BMI ABV UP PARAM F/U: HCPCS | Performed by: INTERNAL MEDICINE

## 2024-10-16 PROCEDURE — 3023F SPIROM DOC REV: CPT | Performed by: INTERNAL MEDICINE

## 2024-10-16 PROCEDURE — G8400 PT W/DXA NO RESULTS DOC: HCPCS | Performed by: INTERNAL MEDICINE

## 2024-10-16 PROCEDURE — G8484 FLU IMMUNIZE NO ADMIN: HCPCS | Performed by: INTERNAL MEDICINE

## 2024-10-16 PROCEDURE — 99214 OFFICE O/P EST MOD 30 MIN: CPT | Performed by: INTERNAL MEDICINE

## 2024-10-16 RX ORDER — ALBUTEROL SULFATE 90 UG/1
2 INHALANT RESPIRATORY (INHALATION) EVERY 6 HOURS PRN
Qty: 18 G | Refills: 3 | Status: SHIPPED | OUTPATIENT
Start: 2024-10-16

## 2024-10-16 ASSESSMENT — ENCOUNTER SYMPTOMS
COUGH: 1
ABDOMINAL PAIN: 0
TROUBLE SWALLOWING: 0
WHEEZING: 1
APNEA: 0
COLOR CHANGE: 0
CHEST TIGHTNESS: 0
RHINORRHEA: 0
BACK PAIN: 0
SHORTNESS OF BREATH: 1
BLOOD IN STOOL: 0
CHOKING: 0
VOMITING: 0
DIARRHEA: 0
EYE PAIN: 0
EYE REDNESS: 0
SORE THROAT: 0
PHOTOPHOBIA: 0
STRIDOR: 0
NAUSEA: 0
VOICE CHANGE: 0
EYE ITCHING: 0
SINUS PRESSURE: 0
EYE DISCHARGE: 0
CONSTIPATION: 0

## 2024-10-16 NOTE — PROGRESS NOTES
Felicity Oquendo (:  1950) is a 74 y.o. female,New patient, here for evaluation of the following chief complaint(s):  Follow-up Chronic Condition (NSCLC), Follow-Up from Hospital ([facility] 24-24: Right Upper Lobectomy), Shortness of Breath, and Results (PFT done 10/14/24)      Assessment & Plan   1. Non-small cell carcinoma of lung, right (HCC)  -     [facility],  [facility] ([facility])  2. S/P lobectomy of lung  -     [facility],  [facility] ([facility])  3. Former smoker  4. COPD, severe (HCC)  -     [facility],  [facility])      Incidentally noted R lung spiculated nodule with mediastinal adenopathy, Biopsy proven NSCLC, Squamous histology  SOB likely COPD +/- restriction following RU lobectomy.  Start Albuterol prn for SOB/wheeze pending PFT's  Stiolto for maintenance. Inhaler technique demonstrated to pt.  Referral to Pulmonary rehab. Possible benefits discussed with pt and   Further intervention pending test results and response to therapy.  Questions asked and answered to pt's satisfaction.  Return in about 6 months (around 2025).       Subjective   Pt was referred for incidental note of a lung mass on an ED visit 2024 for shortness of breath. Pt is a former smoker who carries a diagnosis of COPD but is not on any specific treatment. As shortness of breath occurred after a long car ride and was associated with chest pain, pt had a CTA which was negative for PE. She notes SOB started on  after a bout with COVID. This was associated with occasional wheezing and progressed to the point where pt was dyspneic with mild to moderate exertion. Dyspnea was triggered by light chores and walking up less than 1 flight of stairs.     Pt with lung nodule, CT needle biopsy showed NSCLC Squamous histology. Pt underwent R minithoracotomy and RU lobectomy on 2024. Margins and LN were negative for tumor. Pt

## 2024-10-16 NOTE — PROGRESS NOTES
Felicity SALVATORE Oquendo presents today for   Chief Complaint   Patient presents with    Follow-up Chronic Condition     NSCLC    Follow-Up from Hospital     [facility] 24-24: Right Upper Lobectomy       Is someone accompanying this pt?     Is the patient using any DME equipment during OV? No    -DME Company NA    Depression Screenin/10/2023    11:58 AM   PHQ-9 Questionaire   Little interest or pleasure in doing things 0   Feeling down, depressed, or hopeless 0   PHQ-9 Total Score 0       Learning Needs Questionnaire:     Who is the primary learner? Patient    What is the preferred language for health care of the primary learner? ENGLISH    How does the primary learner prefer to learn new concepts? DEMONSTRATION    Answered By Patient    Relationship to Learner SELF          Fall Risk:          No data to display                 Abuse Screening:          No data to display                  Coordination of Care:    1. Have you been to the ER, urgent care clinic since your last visit? Hospitalized since your last visit? [facility] 24-24: Right Upper Lobectomy    2. Have you seen or consulted any other health care providers outside of the [facility] since your last visit? Include any pap smears or colon screening. PCP    Medication list has been update per patient.

## 2024-10-22 ENCOUNTER — HOSPITAL ENCOUNTER (OUTPATIENT)
Facility: HOSPITAL | Age: 74
Setting detail: RECURRING SERIES
Discharge: HOME | End: 2024-10-25
Payer: MEDICARE

## 2024-10-22 VITALS — OXYGEN SATURATION: 96 %

## 2024-10-22 PROCEDURE — 94618 PULMONARY STRESS TESTING: CPT

## 2024-10-22 RX ORDER — OMEGA-3S/DHA/EPA/FISH OIL/D3 300MG-1000
400 CAPSULE ORAL DAILY
COMMUNITY

## 2024-10-22 RX ORDER — M-VIT,TX,IRON,MINS/CALC/FOLIC 27MG-0.4MG
1 TABLET ORAL DAILY
COMMUNITY

## 2024-10-22 RX ORDER — VITAMIN B COMPLEX
100 TABLET ORAL DAILY
COMMUNITY

## 2024-10-22 RX ORDER — ASPIRIN 81 MG/1
81 TABLET ORAL DAILY
COMMUNITY

## 2024-10-22 RX ORDER — MAGNESIUM 30 MG
400 TABLET ORAL DAILY
COMMUNITY

## 2024-10-22 ASSESSMENT — LIFESTYLE VARIABLES: ALCOHOL_USE: NO

## 2024-10-22 ASSESSMENT — PATIENT HEALTH QUESTIONNAIRE - PHQ9
4. FEELING TIRED OR HAVING LITTLE ENERGY: SEVERAL DAYS
6. FEELING BAD ABOUT YOURSELF - OR THAT YOU ARE A FAILURE OR HAVE LET YOURSELF OR YOUR FAMILY DOWN: NOT AT ALL
SUM OF ALL RESPONSES TO PHQ QUESTIONS 1-9: 3
SUM OF ALL RESPONSES TO PHQ QUESTIONS 1-9: 3
5. POOR APPETITE OR OVEREATING: SEVERAL DAYS
SUM OF ALL RESPONSES TO PHQ QUESTIONS 1-9: 3
1. LITTLE INTEREST OR PLEASURE IN DOING THINGS: NOT AT ALL
8. MOVING OR SPEAKING SO SLOWLY THAT OTHER PEOPLE COULD HAVE NOTICED. OR THE OPPOSITE, BEING SO FIGETY OR RESTLESS THAT YOU HAVE BEEN MOVING AROUND A LOT MORE THAN USUAL: NOT AT ALL
7. TROUBLE CONCENTRATING ON THINGS, SUCH AS READING THE NEWSPAPER OR WATCHING TELEVISION: NOT AT ALL
10. IF YOU CHECKED OFF ANY PROBLEMS, HOW DIFFICULT HAVE THESE PROBLEMS MADE IT FOR YOU TO DO YOUR WORK, TAKE CARE OF THINGS AT HOME, OR GET ALONG WITH OTHER PEOPLE: NOT DIFFICULT AT ALL
SUM OF ALL RESPONSES TO PHQ QUESTIONS 1-9: 3
9. THOUGHTS THAT YOU WOULD BE BETTER OFF DEAD, OR OF HURTING YOURSELF: NOT AT ALL
3. TROUBLE FALLING OR STAYING ASLEEP: SEVERAL DAYS
SUM OF ALL RESPONSES TO PHQ9 QUESTIONS 1 & 2: 0
2. FEELING DOWN, DEPRESSED OR HOPELESS: NOT AT ALL

## 2024-10-22 ASSESSMENT — PULMONARY FUNCTION TESTS
FEV1/FVC: 68
FVC (LITERS): 1.43
FEV1 (%PREDICTED): 91

## 2024-10-22 ASSESSMENT — EXERCISE STRESS TEST
PEAK_METS: 1.55
PEAK_HR: 71
PEAK_RPE: 11
PEAK_BP: 167/87

## 2024-10-22 ASSESSMENT — EJECTION FRACTION: EF_VALUE: 60

## 2024-10-22 NOTE — PROGRESS NOTES
Any joint or mobility limitations: no specific joint    Current mood assessment Content    Needs referral to referring MD for follow up? no    Barrier(s) to learning:  None     [x]  Family & home situation will enhance Pulmonary Rehabilitation, patient is a good candidate     []  Family & home situation will hinder Pulmonary Rehabilitation but patient will be accepted into the program.          Initial Orientation      Patient educated ion current diagnosis being treated for and benefits of pulmonary rehab        Patient orientated on what to expect during Pulmonary Rehabilitation, to include exercise at each session, education on subjects appropriate to their specific lung condition  Patient orientated to facility to include staff, entrance and exit, marked parking, location of restrooms, drinking water source, scale and safe use of equipment  Patient Educated on responsibilities to include bringing rescue inhaler with them to exercise sessions, wearing appropriate shoes and clothing to exercise in, attendance expectations, recognizing signs and symptoms of illness and reporting medication changes     Target Goals      Patient name: Felicity Oquendo : 1950     Goals Timeframe Progress Comments   1. Education    Exercise capacity: Improve stamina for ADLs  Exercise Tolerance: Improve via developing and following individualized exercise plan  Symptoms with Exercise: Address symptoms that my limit exercise tolerance  60-90 days [x] initial  [] met                  [] not met  [] progressing    2. Infection prevention    Mucous Mobilization:   Assess difficulty of mucous mobilization  Provide educational material, Educate on mucous clearance techniques and devices    Infection Prevention:  Educate on importance of preventing recurrent infections  Provide information on infection prevention techniques  Decrease number/frequency of infections and hospitalizations  Encourage vaccinations 60-90 days   [x] 
  Goal Status Initial   Progress Towards Goal --   Patient Stated Nutrition Goals --   Education    Learning Barrier Ready to learn   Cardiac Knowledge Test Score --   Education Schedule Given No   Retired, Read Only Education Intervention    Education Schedule Given No   Patient Education    Education Activities of daily living, Breaking the dyspnea cycle, Bronchial hygiene/controlled cough, CAD, Cardiac A&P, DM & lung disease, Heart/lung association, Med compliance, Nebulizer use, Preventing infection, Pulmonary A&P, lung/gas exchange, Pulmonary medications, Risk factors, Signs/symptoms of angina, Signs/symptoms of hypo/hyperglycemia   Comments --   Education Target Goals    Target Goals Correct demonstration of breathing techniques, Correct demonstration of MDI use and care, Correct demonstration/verbalization of O2 therapy   Retired, Read Only Patient Stated Education Goals --   Physician Response    MD Response --   Add or Change to Rehab Plan --   MD Signature --   Target Goals        Patient name: Felicity Oquendo : 1950      Goals Timeframe Progress Comments   1. Education     Exercise capacity: Improve stamina for ADLs  Exercise Tolerance: Improve via developing and following individualized exercise plan  Symptoms with Exercise: Address symptoms that my limit exercise tolerance  60-90 days [x] initial  [] met                             [] not met  [] progressing     2. Infection prevention     Mucous Mobilization:   Assess difficulty of mucous mobilization  Provide educational material, Educate on mucous clearance techniques and devices     Infection Prevention:  Educate on importance of preventing recurrent infections  Provide information on infection prevention techniques  Decrease number/frequency of infections and hospitalizations  Encourage vaccinations 60-90 days    [x] initial  [] met                             [] not met  [] progressing        3. Self monitoring     Encourage

## 2024-10-28 ENCOUNTER — HOSPITAL ENCOUNTER (OUTPATIENT)
Facility: HOSPITAL | Age: 74
Setting detail: RECURRING SERIES
Discharge: HOME | End: 2024-10-31
Payer: MEDICARE

## 2024-10-28 PROCEDURE — G0237 THERAPEUTIC PROCD STRG ENDUR: HCPCS

## 2024-10-28 PROCEDURE — G0239 OTH RESP PROC, GROUP: HCPCS

## 2024-10-28 NOTE — PROGRESS NOTES
Pulmonary/RT Note    Visit #               Felicity Oquendo is a 74 y.o. female presented today for pulmonary rehab. She state that there have been no changes in medication or health since the last visit.          She has a target heart rate of . She tolerated the exercise session well and has a pain level of 2. Patient states RPE for session today was 9 and RPD was a 1. Today the pt did:      Nustep:15 min Workload  Arm bike:  0 min  Walkin min  Resistance bands: 0 min   Breathing retraining: 15 min  Bodyweight:0  min  Education: 15 min Pursed lip breathing  Medications Exercise equipment orientation and benefits  Self pacing and energy conservation             Physician available for emergencies: Dr. Mickey Colvin, RT 10/28/2024 11:26 AM

## 2024-10-31 ENCOUNTER — HOSPITAL ENCOUNTER (OUTPATIENT)
Facility: HOSPITAL | Age: 74
Setting detail: RECURRING SERIES
Discharge: HOME | End: 2024-11-03
Payer: MEDICARE

## 2024-10-31 PROCEDURE — G0237 THERAPEUTIC PROCD STRG ENDUR: HCPCS

## 2024-10-31 NOTE — PROGRESS NOTES
Pulmonary/RT Note    Visit #    3/36           Felicity Oquendo is a 74 y.o. female presented today for pulmonary rehab. She state that there have been no changes in medication or health since the last visit.          She has a target heart rate of . She tolerated the exercise session well and has a pain level of 2 beneath her right rib. Patient states RPE for session today was 10 and RPD was a 1. Today the pt did:      Nustep: 10 min  Arm bike: 10 min  Breathing retraining: 15 min  Recovery and monitoring: 15 min  Education: 5 min  Pursed lip breathing  Exercise equipment orientation and benefits  Self pacing and energy conservation               Physician available for emergencies: Dr. Aaron Marroquin, ARINA 10/31/2024 11:08 AM

## 2024-11-04 ENCOUNTER — HOSPITAL ENCOUNTER (OUTPATIENT)
Facility: HOSPITAL | Age: 74
Setting detail: RECURRING SERIES
Discharge: HOME | End: 2024-11-07
Payer: MEDICARE

## 2024-11-04 PROCEDURE — G0237 THERAPEUTIC PROCD STRG ENDUR: HCPCS

## 2024-11-04 PROCEDURE — G0239 OTH RESP PROC, GROUP: HCPCS

## 2024-11-04 NOTE — PROGRESS NOTES
Pulmonary/RT Note    Visit #               Felicity Oquendo is a 74 y.o. female presented today for pulmonary rehab. She state that there have been no changes in medication or health since the last visit.          She has a target heart rate of . She tolerated the exercise session well and has a pain level of 3. Patient states RPE for session today was 13 and RPD was a 1. Today the pt did:      Nustep: 0 min  Arm bike: 15 min  Walkin min  Resistance bands:  0 min   Breathing retraining: 15 min  Bodyweight: 0 min  Bike: 15 min  Recovery: 15 min             Physician available for emergencies:Dr. Aaron Colvin, RT 2024 11:14 AM

## 2024-11-07 ENCOUNTER — HOSPITAL ENCOUNTER (OUTPATIENT)
Facility: HOSPITAL | Age: 74
Setting detail: RECURRING SERIES
Discharge: HOME | End: 2024-11-10
Payer: MEDICARE

## 2024-11-07 PROCEDURE — G0239 OTH RESP PROC, GROUP: HCPCS

## 2024-11-07 PROCEDURE — G0237 THERAPEUTIC PROCD STRG ENDUR: HCPCS

## 2024-11-07 NOTE — PROGRESS NOTES
Pulmonary/RT Note    Visit #     5/36          Felicity Oquendo is a 74 y.o. female presented today for pulmonary rehab. She state that there have been no changes in medication or health since the last visit.          She has a target heart rate of . She tolerated the exercise session well and has a pain level of 2 -right sided rib area. Patient states RPE for session today was 14 and RPD was a 3. Today the pt did:      Nustep: 5 min  Arm bike: 15 min  Breathing retraining: 15 min  Recovery and monitoring: 15 min  Education: 5 min  Exercise equipment orientation and benefits               Physician available for emergencies: Dr. Aaron Marroquin, ARINA 11/7/2024 10:17 AM

## 2024-11-11 ENCOUNTER — HOSPITAL ENCOUNTER (OUTPATIENT)
Facility: HOSPITAL | Age: 74
Setting detail: RECURRING SERIES
Discharge: HOME | End: 2024-11-14
Payer: MEDICARE

## 2024-11-11 PROCEDURE — G0239 OTH RESP PROC, GROUP: HCPCS

## 2024-11-11 PROCEDURE — G0237 THERAPEUTIC PROCD STRG ENDUR: HCPCS

## 2024-11-11 NOTE — PROGRESS NOTES
Pulmonary/RT Note    Visit #               Felicity Oquendo is a 74 y.o. female presented today for pulmonary rehab. She state that there have been no changes in medication or health since the last visit.          She has a target heart rate of . She tolerated the exercise session well and has a pain level of 3. Patient states RPE for session today was 11 and RPD was a 1. Today the pt did:      Nustep: 15min Workload 3  Arm bike: 15 min  Walkin min  Resistance bands:  0 min   Breathing retraining: 15 min  Bodyweight: 0 min  Recovery: 15 min             Physician available for emergencies: Dr. Mickey Colvin, RT 2024 10:46 AM

## 2024-11-14 ENCOUNTER — HOSPITAL ENCOUNTER (OUTPATIENT)
Facility: HOSPITAL | Age: 74
Setting detail: RECURRING SERIES
Discharge: HOME | End: 2024-11-17
Payer: MEDICARE

## 2024-11-14 ENCOUNTER — CLINICAL DOCUMENTATION (OUTPATIENT)
Age: 74
End: 2024-11-14

## 2024-11-14 PROCEDURE — G0239 OTH RESP PROC, GROUP: HCPCS

## 2024-11-14 PROCEDURE — G0237 THERAPEUTIC PROCD STRG ENDUR: HCPCS

## 2024-11-14 RX ORDER — TIOTROPIUM BROMIDE AND OLODATEROL 3.124; 2.736 UG/1; UG/1
SPRAY, METERED RESPIRATORY (INHALATION)
Qty: 12 G | Refills: 5 | Status: SHIPPED | OUTPATIENT
Start: 2024-11-14

## 2024-11-14 NOTE — PROGRESS NOTES
Pulmonary/RT Note    Visit #    7/36           Felicity Oquendo is a 74 y.o. female presented today for pulmonary rehab. She state that there have been no changes in medication or health since the last visit.          She has a target heart rate of . She tolerated the exercise session well and has a pain level of 2 in her right rib pre session and 1 post. Patient states RPE for session today was 13 and RPD was a 3. Today the pt did:      Nustep: 15 min  Arm bike:  15 min  Breathing retraining: 15 min  Recovery and monitoring: 15 min                 Physician available for emergencies: Dr. Tobias Marroquin RCP 11/14/2024 10:34 AM

## 2024-11-14 NOTE — TELEPHONE ENCOUNTER
Pt's juliane states pharmacy does not have Stiolto.  Can they get samples?    Please call [phone].

## 2024-11-18 ENCOUNTER — HOSPITAL ENCOUNTER (OUTPATIENT)
Facility: HOSPITAL | Age: 74
Setting detail: RECURRING SERIES
Discharge: HOME | End: 2024-11-21
Payer: MEDICARE

## 2024-11-18 VITALS — OXYGEN SATURATION: 96 %

## 2024-11-18 PROCEDURE — G0237 THERAPEUTIC PROCD STRG ENDUR: HCPCS

## 2024-11-18 ASSESSMENT — EJECTION FRACTION: EF_VALUE: 60

## 2024-11-18 ASSESSMENT — PULMONARY FUNCTION TESTS
FEV1 (%PREDICTED): 91
FEV1/FVC: 68
FVC (LITERS): 1.43

## 2024-11-18 ASSESSMENT — EXERCISE STRESS TEST
PEAK_BP: 167/87
PEAK_RPE: 11
PEAK_HR: 71
PEAK_METS: 1.55

## 2024-11-18 ASSESSMENT — LIFESTYLE VARIABLES: ALCOHOL_USE: NO

## 2024-11-18 NOTE — PROGRESS NOTES
Pulmonary/RT Note    Visit #               Felicity Oquendo is a 74 y.o. female presented today for pulmonary rehab. She state that there have been no changes in medication or health since the last visit.          She has a target heart rate of . She tolerated the exercise session well and has a pain level of 0. Patient states RPE for session today was 13 and RPD was a 3. Today the pt did:      Nustep: 15 min Workload 4  Arm bike:15  min  Walkin min  Resistance bands:  0 min   Breathing retraining: 15 min  Bodyweight:0  min  Recovery: 15 min     Physician available for emergencies: Dr. Alma Rosa Colvin, RT 2024 8:58 AM

## 2024-11-18 NOTE — PROGRESS NOTES
Felicity Oquendo #[id number] (CSN:[id number]) (74 y.o. F) (Adm: 11/18/24)  [facility]  Cardiac ITP    Flowsheet Row CARDIO PULMONARY REHAB from 11/18/2024 in [facility] PULMONARY REHAB   Treatment Diagnosis    Treatment Diagnosis 1 Lung cancer   Treatment Diagnosis 2 COPD   Referral Date 10/16/24   Significant Cardiovascular History --   Co-morbidities Diabetes, Malignancy, Pulmonary disease   Oxygen Saturation / Titration    Stages of Change Maintenance   Oxygen Assessment    Stages of Change Maintenance   Oxygen Type --  [n/a]   Oxygen Compliant --   O2 Flow Rate (l/min) - Rest --   O2 Flow Rate (l/min) - Exercise --   O2 Flow Rate (l/min) - Sleep --   Breath Sounds --   O2 Sat Greater Than 90% --   Retired, Read Only Patients Liter Flow --   Individual Treatment Plan    ITP Visit Type Re-assessment   1st Date of Exercise 10/22/24   ITP Next Review Date 12/16/24   Visit #/Total Visits 8/36   EF% 60 %   FVC (Liters) 1.43 liters   FEV1 (%PRED) 91   FEV1/FVC 68   DLCO (mL/mmHg sec) 14.36 ml/mmHg sec   Risk Stratification --   Pulmonary ITP --   Exercise    Lee Total Score --   Stages of Change Preparation, Action   Exercise Test Six minute walk test   Distance Calculated in ft   Distance (Feet-Actual) 422.4 ft   Distance (miles) --   Distance (Feet-Calculated) --   Distance (meters) --   Peak HR 71   Peak /87   Peak RPE 11   Peak Mets 1.55   SpO2 96   O2 Flow Rate (l/min) --   Stops 0   SPO2 Range 96-98   DASI Total Score --   BMI (Calculated) --   FEV1 (%PRED) 91   CHARLES Total Score --   Exercise Prescription    Mode Treadmill, Bike, Stepper, Ergometer   Frequency per week 2   Duration Per Session 60   Intensity METS       1.55   Progression slow   SpO2 96 %   O2 Device Room air   O2 Flow Rate (l/min) --   Retired, Read Only FIO2 (%) --   O2 Temperature --   Comments --   Symptoms with Exercise Shortness of breath   Target Heart Rate    Resistance Training Yes   Weight --   Reps --   Assisted Devices None

## 2024-11-21 ENCOUNTER — HOSPITAL ENCOUNTER (OUTPATIENT)
Facility: HOSPITAL | Age: 74
Setting detail: RECURRING SERIES
Discharge: HOME | End: 2024-11-24
Payer: MEDICARE

## 2024-11-21 PROCEDURE — G0237 THERAPEUTIC PROCD STRG ENDUR: HCPCS

## 2024-11-21 PROCEDURE — G0239 OTH RESP PROC, GROUP: HCPCS

## 2024-11-21 NOTE — PROGRESS NOTES
Pulmonary/RT Note    Visit #    9/36           Felicity Oquendo is a 74 y.o. female presented today for pulmonary rehab. She state that there have been no changes in medication or health since the last visit.          She has a target heart rate of . She tolerated the exercise session well and has a pain level of 2. Patient states RPE for session today was 14 and RPD was a 3. Today the pt did:      Nustep: 15 min  Arm bike: 15 min  Breathing retraining: 15 min  Recovery and monitoring: 15 min                 Physician available for emergencies: Dr. Alma Rosa Marroquin, ARINA 11/21/2024 3:03 PM

## 2024-11-25 ENCOUNTER — HOSPITAL ENCOUNTER (OUTPATIENT)
Facility: HOSPITAL | Age: 74
Setting detail: RECURRING SERIES
Discharge: HOME | End: 2024-11-28
Payer: MEDICARE

## 2024-11-25 PROCEDURE — G0237 THERAPEUTIC PROCD STRG ENDUR: HCPCS

## 2024-11-25 PROCEDURE — G0239 OTH RESP PROC, GROUP: HCPCS

## 2024-11-25 NOTE — PROGRESS NOTES
Pulmonary/RT Note    Visit #    10/36           Felicity Oquendo is a 74 y.o. female presented today for pulmonary rehab. She state that there have been no changes in medication or health since the last visit.          She has a target heart rate of . She tolerated the exercise session well and has a pain level of 2. Patient states RPE for session today was 13 and RPD was a 3. Today the pt did:      Nustep: 15 min  Arm bike: 15 min  Breathing retraining: 15 min  Recovery and monitoring: 15 min                 Physician available for emergencies: Dr. Tobias Marroquin RCP 11/25/2024 10:27 AM

## 2024-12-02 ENCOUNTER — HOSPITAL ENCOUNTER (OUTPATIENT)
Facility: HOSPITAL | Age: 74
Setting detail: RECURRING SERIES
Discharge: HOME | End: 2024-12-05
Payer: MEDICARE

## 2024-12-02 PROCEDURE — G0237 THERAPEUTIC PROCD STRG ENDUR: HCPCS

## 2024-12-02 PROCEDURE — G0239 OTH RESP PROC, GROUP: HCPCS

## 2024-12-02 NOTE — PROGRESS NOTES
Pulmonary/RT Note    Visit #               Felicity Oquendo is a 74 y.o. female presented today for pulmonary rehab. She state that there have been no changes in medication or health since the last visit.          She has a target heart rate of . She tolerated the exercise session well and has a pain level of 2. Patient states RPE for session today was 14 and RPD was a 3. Today the pt did:      Nustep: 15 min Workload 4  Arm bike: 15 min  Walkin min  Resistance bands:  0 min   Breathing retraining: 15 min  Bodyweight:  0 min  Recovery:15 min             Physician available for emergencies: Dr. Aaron Colvin, RT 2024 1:29 PM

## 2024-12-05 ENCOUNTER — HOSPITAL ENCOUNTER (OUTPATIENT)
Facility: HOSPITAL | Age: 74
Setting detail: RECURRING SERIES
Discharge: HOME | End: 2024-12-08
Payer: MEDICARE

## 2024-12-05 PROCEDURE — G0237 THERAPEUTIC PROCD STRG ENDUR: HCPCS

## 2024-12-05 PROCEDURE — G0239 OTH RESP PROC, GROUP: HCPCS

## 2024-12-05 NOTE — PROGRESS NOTES
Pulmonary/RT Note    Visit #               Felicity Oquendo is a 74 y.o. female presented today for pulmonary rehab. She state that there have been no changes in medication or health since the last visit.          She has a target heart rate of . She tolerated the exercise session well and has a pain level of 0. Patient states RPE for session today was 13 and RPD was a 3. Today the pt did:      Nustep: 5 min  Resistance bands: 5 min rows  Weights: 5 min 2 lbs bicep curls w/press  Breathing retrainin min  Recovery and monitoring:15  min  Education 5 min  Pursed lip breathing and lung characteristics of chronic lung disease and gas exchange  Exercise equipment orientation and benefits                Physician available for emergencies: Dr. Alma Rosa Marroquin RCP 2024 11:31 AM

## 2024-12-09 ENCOUNTER — HOSPITAL ENCOUNTER (OUTPATIENT)
Facility: HOSPITAL | Age: 74
Setting detail: RECURRING SERIES
Discharge: HOME | End: 2024-12-12
Payer: MEDICARE

## 2024-12-09 ENCOUNTER — APPOINTMENT (OUTPATIENT)
Facility: HOSPITAL | Age: 74
End: 2024-12-09
Payer: MEDICARE

## 2024-12-09 PROCEDURE — G0237 THERAPEUTIC PROCD STRG ENDUR: HCPCS

## 2024-12-09 NOTE — PROGRESS NOTES
Pulmonary/RT Note    Visit #    13/36           Felicity Oquendo is a 74 y.o. female presented today for pulmonary rehab. She state that there have been no changes in medication or health since the last visit.     She had a flare up after going outside that required her to use her rescue inhaler with some improvement. She was advised to wear a cover up around her mouth and nose to limit cold exposure. She will bring her inhaler for re-instruct.          She has a target heart rate of . She tolerated the exercise session well and has a pain level of 2. Patient states RPE for session today was 14 and RPD was a 3. Today the pt did:      Nustep: 15 min w/workload of 6  Arm bike: 15  min level 2  Breathing retraining: 15 min  Recovery and monitoring: 15  min  Education: 5 min weather and effects on lungs and prevention strategies               Physician available for emergencies: Dr. Adryan Marroquin, ARINA 12/9/2024 12:09 PM

## 2024-12-11 ENCOUNTER — HOSPITAL ENCOUNTER (OUTPATIENT)
Facility: HOSPITAL | Age: 74
Setting detail: RECURRING SERIES
Discharge: HOME | End: 2024-12-14
Payer: MEDICARE

## 2024-12-11 PROCEDURE — G0237 THERAPEUTIC PROCD STRG ENDUR: HCPCS

## 2024-12-11 NOTE — PROGRESS NOTES
Pulmonary/RT Note    Visit #    14/36           Felicity Oquendo is a 74 y.o. female presented today for pulmonary rehab. She state that there have been no changes in medication or health since the last visit.          She has a target heart rate of . She tolerated the exercise session well and has a pain level of 2 -right rib. Patient states RPE for session today was 14 and RPD was a 3. Today the pt did:      Nustep: 15 min w/workload of 6  Arm bike: 5 min level 2  Resistance bands: 5 min   Weights: 5  min  Breathing retraining: 15 min  Recovery and monitoring: 15 min  Education: 5 min  Exercise equipment orientation and benefits               Physician available for emergencies: Dr. Tobias Marroquin RCP 12/11/2024 11:56 AM

## 2024-12-12 ENCOUNTER — APPOINTMENT (OUTPATIENT)
Facility: HOSPITAL | Age: 74
End: 2024-12-12
Payer: MEDICARE

## 2024-12-13 ENCOUNTER — HOSPITAL ENCOUNTER (OUTPATIENT)
Facility: HOSPITAL | Age: 74
Setting detail: RECURRING SERIES
Discharge: HOME | End: 2024-12-16
Payer: MEDICARE

## 2024-12-13 PROCEDURE — G0237 THERAPEUTIC PROCD STRG ENDUR: HCPCS

## 2024-12-13 NOTE — PROGRESS NOTES
Pulmonary/RT Note    Visit #     15/36          Felicity Oquendo is a 74 y.o. female presented today for pulmonary rehab. She state that there have been no changes in medication or health since the last visit.          She has a target heart rate of . She tolerated the exercise session well and has a pain level of 2. Patient states RPE for session today was 13 and RPD was a 3. Today the pt did:      Nustep: 15 min Workload 6  Arm bike:  10 min  Walkin  min  Resistance bands/Weights: 10  min   Breathing retraining:15 min   Bodyweight: 0 min  Recovery: 15 min             Physician available for emergencies: Dr. Aaron Colvin, RT 2024 11:10 AM   - - -

## 2024-12-16 ENCOUNTER — HOSPITAL ENCOUNTER (OUTPATIENT)
Facility: HOSPITAL | Age: 74
Setting detail: RECURRING SERIES
Discharge: HOME | End: 2024-12-19
Payer: MEDICARE

## 2024-12-16 ENCOUNTER — APPOINTMENT (OUTPATIENT)
Facility: HOSPITAL | Age: 74
End: 2024-12-16
Payer: MEDICARE

## 2024-12-16 PROCEDURE — G0239 OTH RESP PROC, GROUP: HCPCS

## 2024-12-16 PROCEDURE — G0237 THERAPEUTIC PROCD STRG ENDUR: HCPCS

## 2024-12-16 NOTE — PROGRESS NOTES
Pulmonary/RT Note    Visit #               Felicity Oquendo is a 74 y.o. female presented today for pulmonary rehab. She state that there have been no changes in medication or health since the last visit.          She has a target heart rate of . She tolerated the exercise session well and has a pain level of 0. Patient states RPE for session today was 13 and RPD was a 0. Today the pt did:      Nustep: 0 min  Arm bike: 15 min  Walkin min  Resistance bands/Weights:  15 min   Breathing retraining: 15 min  Bodyweight:  0 min  Recovery: 15 min             Physician available for emergencies: Dr. Courtney Colvin, RT 2024 10:15 AM

## 2024-12-18 ENCOUNTER — HOSPITAL ENCOUNTER (OUTPATIENT)
Facility: HOSPITAL | Age: 74
Setting detail: RECURRING SERIES
Discharge: HOME | End: 2024-12-21
Payer: MEDICARE

## 2024-12-18 VITALS — OXYGEN SATURATION: 96 %

## 2024-12-18 PROCEDURE — G0237 THERAPEUTIC PROCD STRG ENDUR: HCPCS

## 2024-12-18 ASSESSMENT — PULMONARY FUNCTION TESTS
FEV1 (%PREDICTED): 91
FVC (LITERS): 1.43
FEV1/FVC: 68

## 2024-12-18 ASSESSMENT — EXERCISE STRESS TEST
PEAK_HR: 71
PEAK_RPE: 11
PEAK_METS: 1.55
PEAK_BP: 167/87

## 2024-12-18 ASSESSMENT — EJECTION FRACTION: EF_VALUE: 60

## 2024-12-18 ASSESSMENT — LIFESTYLE VARIABLES: ALCOHOL_USE: NO

## 2024-12-18 NOTE — PROGRESS NOTES
Pulmonary/RT Note    Visit #    17/36           Felicity Oquendo is a 74 y.o. female presented today for pulmonary rehab. She state that there have been no changes in medication or health since the last visit.          She has a target heart rate of . She tolerated the exercise session well and has a pain level of 0. Patient states RPE for session today was 13 and RPD was a 3-4. Today the pt did:      Nustep:15  min w/ workload of 6  Arm bike:  15 min level 3  Weights: 10 min bicep curl and shoulder press 3 lbs 3x10  Breathing retraining: 15  min  Recovery and monitoring: 15 min                 Physician available for emergencies: Dr. Mickey Marroquin RCP 12/18/2024 11:43 AM  
cough, Heart/lung association, Med compliance, Nebulizer use, Preventing infection, Pulmonary A&P, lung/gas exchange, Pulmonary medications, Other (comment), Tobacco triggers   Comments --   Education Target Goals    Target Goals Correct demonstration of breathing techniques, Correct demonstration of MDI use and care, Correct demonstration/verbalization of O2 therapy   Retired, Read Only Patient Stated Education Goals --   Physician Response    MD Response --   Add or Change to Rehab Plan --   MD Signature --     Goals Timeframe Progress Comments   1. Education     Exercise capacity: Improve stamina for ADLs  Exercise Tolerance: Improve via developing and following individualized exercise plan  Symptoms with Exercise: Address symptoms that my limit exercise tolerance     Pt's stated goal is to \"breath better  when walking and to become active\" 60-90 days [] initial  [] met                             [] not met  [x] progressing     2. Infection prevention     Mucous Mobilization:   Assess difficulty of mucous mobilization  Provide educational material, Educate on mucous clearance techniques and devices     Infection Prevention:  Educate on importance of preventing recurrent infections  Provide information on infection prevention techniques  Decrease number/frequency of infections and hospitalizations  Encourage vaccinations 60-90 days    [] initial  [] met                             [] not met  [x] progressing        3. Self monitoring     Encourage vaccinations  Shortness of breath: Educate on pursed lip breathing, self-monitoring, interval training.  increase/decrease/maintain weight, weigh daily in clinic, increase vegetable /fiber intake, decrease saturated fats, exercise at least 2 days per week, provide nutrition information, and schedule nutrition consult     60-90 days [] initial  [] met                             [] not met  [x] progressing     4. Improve mental health status  anxiety/depression QOL:

## 2024-12-19 ENCOUNTER — APPOINTMENT (OUTPATIENT)
Facility: HOSPITAL | Age: 74
End: 2024-12-19
Payer: MEDICARE

## 2024-12-20 ENCOUNTER — HOSPITAL ENCOUNTER (OUTPATIENT)
Facility: HOSPITAL | Age: 74
Setting detail: RECURRING SERIES
Discharge: HOME | End: 2024-12-23
Payer: MEDICARE

## 2024-12-20 PROCEDURE — G0239 OTH RESP PROC, GROUP: HCPCS

## 2024-12-20 PROCEDURE — G0237 THERAPEUTIC PROCD STRG ENDUR: HCPCS

## 2024-12-20 NOTE — PROGRESS NOTES
Pulmonary/RT Note    Visit #               Felicity Oquendo is a 74 y.o. female presented today for pulmonary rehab. She state that there have been no changes in medication or health since the last visit.          She has a target heart rate of . She tolerated the exercise session well and has a pain level of 0. Patient states RPE for session today was 12 and RPD was a 3. Today the pt did:      Nustep: 15 min  Arm bike:  15 min  Walkin  min  Resistance bands:  0 min   Breathing retraining: 15 min  Bodyweight:  0 min  Recovery: 15 min        Cardiopulmonary Rehab Check-in Questionnaire                      Do you exercise at home?  [] Yes          [x]  No      If yes, how many minutes ___n/a________       what do you do?         Do you smoke [x]     No    VAPE  []             use recreational []      (Check if use)    If so how many pks a day? ___n.a_______________________________    Do you wear a nicotine patch?n/a        Are you benefiting from pulmonary rehab? yes        Do you have any new goals?  If yes explain: \" Breathe better while walking and active     Physician available for emergencies: Dr. Courtney Colvin, RT 2024 10:29 AM

## 2024-12-23 ENCOUNTER — APPOINTMENT (OUTPATIENT)
Facility: HOSPITAL | Age: 74
End: 2024-12-23
Payer: MEDICARE

## 2024-12-23 ENCOUNTER — HOSPITAL ENCOUNTER (OUTPATIENT)
Facility: HOSPITAL | Age: 74
Setting detail: RECURRING SERIES
Discharge: HOME | End: 2024-12-26
Payer: MEDICARE

## 2024-12-23 PROCEDURE — G0237 THERAPEUTIC PROCD STRG ENDUR: HCPCS

## 2024-12-23 PROCEDURE — G0239 OTH RESP PROC, GROUP: HCPCS

## 2024-12-23 NOTE — PROGRESS NOTES
Pulmonary/RT Note    Visit #               Felicity Oquendo is a 74 y.o. female presented today for pulmonary rehab. She state that there have been no changes in medication or health since the last visit.          She has a target heart rate of . She tolerated the exercise session well and has a pain level of 0. Patient states RPE for session today was 13 and RPD was a 3. Today the pt did:      Nustep: 15 min workload 6  Arm bike: 15 min  Walkin min  Resistance bands/Weights: 10 min   Breathing retraining: 15 min  Bodyweight: 0 min  Recovery: 15 min             Physician available for emergencies:Dr. Tobias Colvin, RT 2024 11:03 AM

## 2024-12-26 ENCOUNTER — APPOINTMENT (OUTPATIENT)
Facility: HOSPITAL | Age: 74
End: 2024-12-26
Payer: MEDICARE

## 2024-12-27 ENCOUNTER — HOSPITAL ENCOUNTER (OUTPATIENT)
Facility: HOSPITAL | Age: 74
Setting detail: RECURRING SERIES
Discharge: HOME | End: 2024-12-30
Payer: MEDICARE

## 2024-12-27 PROCEDURE — G0237 THERAPEUTIC PROCD STRG ENDUR: HCPCS

## 2024-12-27 NOTE — PROGRESS NOTES
Pulmonary/RT Note    Visit #               Felicity Oquendo is a 74 y.o. female presented today for pulmonary rehab. She state that there have been no changes in medication or health since the last visit.          She has a target heart rate of . She tolerated the exercise session well and has a pain level of 2(back). Patient states RPE for session today was 14 and RPD was a 3. Today the pt did:      Nustep: 15 min Workload 6  Arm bike: 15 min  Walkin min  Resistance bands/Weight:3lb 5  min   Breathing retraining: 15 min  Bodyweight: 0  min  Recovery: 15 min             Physician available for emergencies: Dr. Tobias Colvin, RT 2024 12:01 PM

## 2024-12-30 ENCOUNTER — HOSPITAL ENCOUNTER (OUTPATIENT)
Facility: HOSPITAL | Age: 74
Setting detail: RECURRING SERIES
Discharge: HOME | End: 2025-01-02
Payer: MEDICARE

## 2024-12-30 ENCOUNTER — APPOINTMENT (OUTPATIENT)
Facility: HOSPITAL | Age: 74
End: 2024-12-30
Payer: MEDICARE

## 2024-12-30 PROCEDURE — G0237 THERAPEUTIC PROCD STRG ENDUR: HCPCS

## 2024-12-30 NOTE — PROGRESS NOTES
Pulmonary/RT Note    Visit #               Felicity Oquendo is a 74 y.o. female presented today for pulmonary rehab. She state that there have been no changes in medication or health since the last visit.          She has a target heart rate of . She tolerated the exercise session well and has a pain level of 0. Patient states RPE for session today was 14 and RPD was a 3. Today the pt did:      Nustep: 15 min   Arm bike: 15 min  Walkin min  Resistance bands:  0 min   Breathing retraining: 15 min  Bodyweight:0  min  Recovery: 15 min             Physician available for emergencies: Dr. Aaron Colvin, RT 2024 11:17 AM

## 2025-01-02 ENCOUNTER — APPOINTMENT (OUTPATIENT)
Facility: HOSPITAL | Age: 75
End: 2025-01-02
Payer: MEDICARE

## 2025-01-03 ENCOUNTER — HOSPITAL ENCOUNTER (OUTPATIENT)
Facility: HOSPITAL | Age: 75
Setting detail: RECURRING SERIES
Discharge: HOME | End: 2025-01-06
Payer: MEDICARE

## 2025-01-03 PROCEDURE — G0237 THERAPEUTIC PROCD STRG ENDUR: HCPCS

## 2025-01-03 NOTE — PROGRESS NOTES
Pulmonary/RT Note    Visit #               Felicity Oquendo is a 74 y.o. female presented today for pulmonary rehab. She state that there have been no changes in medication or health since the last visit.          She has a target heart rate of . She tolerated the exercise session well and has a pain level of 0. Patient states RPE for session today was 13 and RPD was a 3. Today the pt did:      Nustep: 15 min  Arm bike: 15 min  Walkin min  Resistance bands: 0 min   Breathing retraining: 15 min  Bodyweight:  min  Recovery:15 min             Physician available for emergencies: Dr. Aaron Colvin, RT 1/3/2025 11:43 AM

## 2025-01-06 ENCOUNTER — HOSPITAL ENCOUNTER (OUTPATIENT)
Facility: HOSPITAL | Age: 75
Setting detail: RECURRING SERIES
Discharge: HOME | End: 2025-01-09
Payer: MEDICARE

## 2025-01-06 ENCOUNTER — APPOINTMENT (OUTPATIENT)
Facility: HOSPITAL | Age: 75
End: 2025-01-06
Payer: MEDICARE

## 2025-01-06 PROCEDURE — G0237 THERAPEUTIC PROCD STRG ENDUR: HCPCS

## 2025-01-06 NOTE — PROGRESS NOTES
Pulmonary/RT Note    Visit #        Felicity Oquendo is a 74 y.o. female presented today for pulmonary rehab. She state that there have been no changes in medication or health since the last visit.          She has a target heart rate of . She tolerated the exercise session well and has a pain level of 0. Patient states RPE for session today was 13 and RPD was a 3. Today the pt did:      Nustep: 15 min Workload 6   Arm bike: 15 min  Walkin min  Resistance bands: 0 min   Breathing retraining: 15 min  Bodyweight: 0  min  Recovery: 15 min             Physician available for emergencies: Dr. Aaron Colvin, RT 2025 11:39 AM

## 2025-01-08 ENCOUNTER — HOSPITAL ENCOUNTER (OUTPATIENT)
Facility: HOSPITAL | Age: 75
Setting detail: RECURRING SERIES
Discharge: HOME | End: 2025-01-11
Payer: MEDICARE

## 2025-01-08 PROCEDURE — G0237 THERAPEUTIC PROCD STRG ENDUR: HCPCS

## 2025-01-08 NOTE — PROGRESS NOTES
Pulmonary/RT Note    Visit #               Felicity Oquendo is a 74 y.o. female presented today for pulmonary rehab. She state that there have been no changes in medication or health since the last visit.          She has a target heart rate of . She tolerated the exercise session well and has a pain level of 0. Patient states RPE for session today was 13 and RPD was a 3. Today the pt did:      Nustep: 15 min w/workload of 5  Arm bike: 15 min level 5  Walkin min hallway with 1.30 second break  Breathing retraining: 15 min  Recovery and monitorin min                 Physician available for emergencies: Dr. Tobias Marroquin, ARINA 2025 1:44 PM

## 2025-01-09 ENCOUNTER — APPOINTMENT (OUTPATIENT)
Facility: HOSPITAL | Age: 75
End: 2025-01-09
Payer: MEDICARE

## 2025-01-10 ENCOUNTER — HOSPITAL ENCOUNTER (OUTPATIENT)
Facility: HOSPITAL | Age: 75
Setting detail: RECURRING SERIES
Discharge: HOME | End: 2025-01-13
Payer: MEDICARE

## 2025-01-10 PROCEDURE — G0237 THERAPEUTIC PROCD STRG ENDUR: HCPCS

## 2025-01-10 PROCEDURE — G0239 OTH RESP PROC, GROUP: HCPCS

## 2025-01-10 NOTE — PROGRESS NOTES
Pulmonary/RT Note    Visit #               Felicity Oquendo is a 74 y.o. female presented today for pulmonary rehab. She state that there have been no changes in medication or health since the last visit.          She has a target heart rate of . She tolerated the exercise session well and has a pain level of 0. Patient states RPE for session today was 12 and RPD was a 3-4. Today the pt did:      Nustep: 15 min  Arm bike: 10 min  Walkin min  Resistance bands:  0 min   Breathing retraining: 15 min  Bodyweight:  0 min  Recovery: 15 min             Physician available for emergencies: Dr. Aaron Colvin, RT 1/10/2025 11:32 AM

## 2025-01-13 ENCOUNTER — APPOINTMENT (OUTPATIENT)
Facility: HOSPITAL | Age: 75
End: 2025-01-13
Payer: MEDICARE

## 2025-01-13 ENCOUNTER — HOSPITAL ENCOUNTER (OUTPATIENT)
Facility: HOSPITAL | Age: 75
Setting detail: RECURRING SERIES
Discharge: HOME | End: 2025-01-16
Payer: MEDICARE

## 2025-01-13 PROCEDURE — G0237 THERAPEUTIC PROCD STRG ENDUR: HCPCS

## 2025-01-13 PROCEDURE — G0239 OTH RESP PROC, GROUP: HCPCS

## 2025-01-13 NOTE — PROGRESS NOTES
Pulmonary/RT Note    Visit #               Felicity Oquendo is a 74 y.o. female presented today for pulmonary rehab. She state that there have been no changes in medication or health since the last visit.          She has a target heart rate of . He tolerated the exercise session well and has a pain level of 0. Patient states RPE for session today was 13 and RPD was a 4. Today the pt did:      Nustep:0 min  Arm bike: 5 min  Walkin min  Resistance bands:  0 min   Breathing retraining:15 min  Bodyweight:  0 min  Bike:15 min  Recovery: 15 min             Physician available for emergencies: Dr. Mickey Colvin, RT 2025 10:25 AM

## 2025-01-15 ENCOUNTER — HOSPITAL ENCOUNTER (OUTPATIENT)
Facility: HOSPITAL | Age: 75
Setting detail: RECURRING SERIES
Discharge: HOME | End: 2025-01-18
Payer: MEDICARE

## 2025-01-15 VITALS — OXYGEN SATURATION: 96 %

## 2025-01-15 PROCEDURE — G0237 THERAPEUTIC PROCD STRG ENDUR: HCPCS

## 2025-01-15 ASSESSMENT — PULMONARY FUNCTION TESTS
FEV1/FVC: 68
FEV1 (%PREDICTED): 91
FVC (LITERS): 1.43

## 2025-01-15 ASSESSMENT — EXERCISE STRESS TEST
PEAK_HR: 71
PEAK_METS: 1.55
PEAK_BP: 167/87
PEAK_RPE: 11

## 2025-01-15 ASSESSMENT — EJECTION FRACTION: EF_VALUE: 60

## 2025-01-15 ASSESSMENT — LIFESTYLE VARIABLES: ALCOHOL_USE: NO

## 2025-01-15 NOTE — PROGRESS NOTES
Felicity Oquendo #[id number] (CSN:[id number]) (74 y.o. F) (Adm: 01/15/25)  [facility]  Pulmonary ITP    Flowsheet Row CARDIO PULMONARY REHAB from 1/15/2025 in [facility] PULMONARY REHAB   Treatment Diagnosis    Treatment Diagnosis 1 Lung cancer   Treatment Diagnosis 2 COPD   Referral Date 10/16/24   Significant Cardiovascular History --   Co-morbidities Diabetes, Malignancy, Pulmonary disease   Oxygen Saturation / Titration    Stages of Change Maintenance   Oxygen Assessment    Stages of Change Maintenance   Oxygen Type --  pt does not wear oxygen   Individual Treatment Plan    ITP Visit Type Re-assessment   1st Date of Exercise 10/22/24   ITP Next Review Date 02/10/25   Visit #/Total Visits 27/36   EF% 60 %   FVC (Liters) 1.43 liters   FEV1 (%PRED) 91   FEV1/FVC 68   DLCO (mL/mmHg sec) 14.36 ml/mmHg sec   TLC 59   Risk Stratification --   Pulmonary ITP --   Exercise    Lee Total Score --   Stages of Change Preparation, Action   Exercise Test Six minute walk test   Distance Calculated in ft   Distance (Feet-Actual) 422 ft   Distance (miles) --   Distance (Feet-Calculated) --   Distance (meters) --   Peak HR 71   Peak /87   Peak RPE 11   Peak Mets 1.55   SpO2 96   O2 Flow Rate (l/min) --   Stops 0   SPO2 Range 96-98   DASI Total Score --   BMI (Calculated) --   FEV1 (%PRED) 91   CHARLES Total Score --   Exercise Prescription    Mode Treadmill, Bike, Stepper, Ergometer   Frequency per week 2   Duration Per Session 60   Intensity METS       1.55   Progression slow   SpO2 96 %   O2 Device Room air   O2 Flow Rate (l/min) --   Retired, Read Only FIO2 (%) --   O2 Temperature --   Comments --   Symptoms with Exercise Shortness of breath   Target Heart Rate    Resistance Training Yes   Weight --   Reps --   Assisted Devices None   Retired, Read Only Exercise Blood Pressures    Retired, Read Only Resting BP --   Retired, Read Only Peak BP --   Retired, Read Only Is BP WDL --   Exercise Intervention    Type walk   Frequency 5x

## 2025-01-15 NOTE — PROGRESS NOTES
Pulmonary/RT Note    Visit #               Felicity Oquendo is a 74 y.o. female presented today for pulmonary rehab. She state that there have been no changes in medication or health since the last visit.          She has a target heart rate of . She tolerated the exercise session well and has a pain level of 0. Patient states RPE for session today was 14 and RPD was a 3. Today the pt did:      Nustep:15 min w/workload of 6  Arm bike: 15 min  Walkin min    Recovery and monitoring: 15  min                 Physician available for emergencies: Dr. Mickey Marroquin, ARINA 1/15/2025 11:32 AM

## 2025-01-16 ENCOUNTER — HOSPITAL ENCOUNTER (OUTPATIENT)
Facility: HOSPITAL | Age: 75
Setting detail: RECURRING SERIES
Discharge: HOME | End: 2025-01-19
Payer: MEDICARE

## 2025-01-16 ENCOUNTER — APPOINTMENT (OUTPATIENT)
Facility: HOSPITAL | Age: 75
End: 2025-01-16
Payer: MEDICARE

## 2025-01-16 PROCEDURE — G0237 THERAPEUTIC PROCD STRG ENDUR: HCPCS

## 2025-01-16 NOTE — PROGRESS NOTES
Pulmonary/RT Note    Visit #               Felicity Oquendo is a 74 y.o. female presented today for pulmonary rehab. She state that there have been no changes in medication or health since the last visit.          She has a target heart rate of . She tolerated the exercise session well and has a pain level of 2. Patient states RPE for session today was 15 and RPD was a 4. Today the pt did:      Nustep: 10 min w/workload of 6  Arm bike: 10 min level 5  Walkin  min  Weights: 7 min 2 lb curls, shoulder press, arm raises-lateral and vertical  Breathing retraining: 15 min  Recovery and monitoring: 10 min                 Physician available for emergencies: Dr. Mickey Marroquin, ARINA 2025 11:05 AM

## 2025-01-20 ENCOUNTER — APPOINTMENT (OUTPATIENT)
Facility: HOSPITAL | Age: 75
End: 2025-01-20
Payer: MEDICARE

## 2025-01-20 ENCOUNTER — HOSPITAL ENCOUNTER (OUTPATIENT)
Facility: HOSPITAL | Age: 75
Setting detail: RECURRING SERIES
Discharge: HOME | End: 2025-01-23
Payer: MEDICARE

## 2025-01-20 PROCEDURE — G0239 OTH RESP PROC, GROUP: HCPCS

## 2025-01-20 PROCEDURE — G0237 THERAPEUTIC PROCD STRG ENDUR: HCPCS

## 2025-01-20 NOTE — PROGRESS NOTES
Pulmonary/RT Note    Visit #               Felicity Oquendo is a 74 y.o. female presented today for pulmonary rehab. She state that there have been no changes in medication or health since the last visit.          She has a target heart rate of . She tolerated the exercise session well and has a pain level of . Patient states RPE for session today was 14 and RPD was a 4. Today the pt did:      Nustep:  15 min  Arm bike: 15 min  Walkin min requested a rest period of 1.5 minutes due to shortness of breath of 4/10  Breathing retraining: 15 min  Recovery and monitoring:10  min                 Physician available for emergencies: Dr. Tobias Marroquin RCP 2025 11:36 AM

## 2025-01-22 ENCOUNTER — APPOINTMENT (OUTPATIENT)
Facility: HOSPITAL | Age: 75
End: 2025-01-22
Payer: MEDICARE

## 2025-01-23 ENCOUNTER — APPOINTMENT (OUTPATIENT)
Facility: HOSPITAL | Age: 75
End: 2025-01-23
Payer: MEDICARE

## 2025-01-24 ENCOUNTER — HOSPITAL ENCOUNTER (OUTPATIENT)
Facility: HOSPITAL | Age: 75
Setting detail: RECURRING SERIES
Discharge: HOME | End: 2025-01-27
Payer: MEDICARE

## 2025-01-24 PROCEDURE — G0239 OTH RESP PROC, GROUP: HCPCS

## 2025-01-24 PROCEDURE — G0237 THERAPEUTIC PROCD STRG ENDUR: HCPCS

## 2025-01-24 NOTE — PROGRESS NOTES
Pulmonary/RT Note    Visit #               Felicity Oquendo is a 74 y.o. female presented today for pulmonary rehab. She state that there have been no changes in medication or health since the last visit.          She has a target heart rate of . She tolerated the exercise session well and has a pain level of 3 (back). Patient states RPE for session today was 14 and RPD was a 3. Today the pt did:      Nustep: 15 min Workload 6  Arm bike:15  min  Walkin min  Resistance bands: 0 min   Breathing retraining: 15 min  Bodyweight:  0 min  Recovery: 15 min             Physician available for emergencies: Dr. Courtney Colvin, RT 2025 11:12 AM36

## 2025-01-27 ENCOUNTER — APPOINTMENT (OUTPATIENT)
Facility: HOSPITAL | Age: 75
End: 2025-01-27
Payer: MEDICARE

## 2025-01-27 ENCOUNTER — HOSPITAL ENCOUNTER (OUTPATIENT)
Facility: HOSPITAL | Age: 75
Setting detail: RECURRING SERIES
Discharge: HOME | End: 2025-01-30
Payer: MEDICARE

## 2025-01-27 PROCEDURE — G0237 THERAPEUTIC PROCD STRG ENDUR: HCPCS

## 2025-01-27 PROCEDURE — G0239 OTH RESP PROC, GROUP: HCPCS

## 2025-01-27 NOTE — PROGRESS NOTES
Pulmonary/RT Note    Visit #               Felicity Oquendo is a 74 y.o. female presented today for pulmonary rehab. She state that there have been no changes in medication or health since the last visit.          She has a target heart rate of . She tolerated the exercise session well and has a pain level of 0. Patient states RPE for session today was 14 and RPD was a 3. Today the pt did:      Nustep: 0 min  Arm bike: 15 min  Walkin min  Resistance bands:  min   Breathing retraining: 15 min  Bodyweight:  0 min  Education:0    Recovery:15 min             Physician available for emergencies: Dr. Prachi Colvin, RT 2025 10:00 AM

## 2025-01-29 ENCOUNTER — HOSPITAL ENCOUNTER (OUTPATIENT)
Facility: HOSPITAL | Age: 75
Setting detail: RECURRING SERIES
Discharge: HOME | End: 2025-02-01
Payer: MEDICARE

## 2025-01-29 PROCEDURE — G0237 THERAPEUTIC PROCD STRG ENDUR: HCPCS

## 2025-01-29 NOTE — PROGRESS NOTES
Pulmonary/RT Note    Visit #    32/36           Felicity Oquendo is a 74 y.o. female presented today for pulmonary rehab. She state that there have been no changes in medication or health since the last visit. Today her blood pressure was elevated at 160/82 then retaken 15 minutes and was 137/79. She has an appointment with her doctor today.         She has a target heart rate of . She tolerated the exercise session well and has a pain level of 2. Patient states RPE for session today was 14 and RPD was a 3. Today the pt did:      Nustep: 15 min w/workload of 6  Breathing retraining: 10 min  Recovery and monitorin  min                 Physician available for emergencies: Dr. Prachi Marroquin, ARINA 2025 11:14 AM

## 2025-01-30 ENCOUNTER — APPOINTMENT (OUTPATIENT)
Facility: HOSPITAL | Age: 75
End: 2025-01-30
Payer: MEDICARE

## 2025-01-31 ENCOUNTER — HOSPITAL ENCOUNTER (OUTPATIENT)
Facility: HOSPITAL | Age: 75
Setting detail: RECURRING SERIES
End: 2025-01-31
Payer: MEDICARE

## 2025-01-31 PROCEDURE — G0239 OTH RESP PROC, GROUP: HCPCS

## 2025-01-31 PROCEDURE — G0237 THERAPEUTIC PROCD STRG ENDUR: HCPCS

## 2025-01-31 NOTE — PROGRESS NOTES
Pulmonary/RT Note    Visit #        33/36       Felicity Oquendo is a 74 y.o. female presented today for pulmonary rehab. She state that there have been no changes in medication or health since the last visit.          She has a target heart rate of . She tolerated the exercise session well and has a pain level of 3. Patient states RPE for session today was 13 and RPD was a 3. Today the pt did:      Nustep:0 min  Arm bike:  10 min  Walkin min  Resistance bands/Weights:  10 min   Breathing retraining: 10 min  Bodyweight:  0 min  Recovery: 10 min             Physician available for emergencies: Dr. Prachi Colvin, RT 2025 10:09 AM

## 2025-02-03 ENCOUNTER — APPOINTMENT (OUTPATIENT)
Facility: HOSPITAL | Age: 75
End: 2025-02-03
Payer: MEDICARE

## 2025-02-03 ENCOUNTER — HOSPITAL ENCOUNTER (OUTPATIENT)
Facility: HOSPITAL | Age: 75
Setting detail: RECURRING SERIES
Discharge: HOME | End: 2025-02-06

## 2025-02-03 NOTE — PROGRESS NOTES
Pulmonary/RT Note    Visit #   34/36            Felicity Oquendo is a 74 y.o. female presented today for pulmonary rehab. She state that there have been no changes in medication or health since the last visit.          She has a target heart rate of . She tolerated the exercise session well and has a pain level of 3 in her rib. Patient states RPE for session today was 14 and RPD was a 3. Today the pt did:      Nustep: 15 min, workload of 6  Walking:  10 min with two rest periods during the walk and one afterwards (RPD of 4-5/10)  Breathing retraining: 10 min  Recovery and monitorin min                 Physician available for emergencies: Dr. Alma Rosa Marroquin, ARINA 2/3/2025 11:20 AM

## 2025-02-04 ENCOUNTER — HOSPITAL ENCOUNTER (OUTPATIENT)
Facility: HOSPITAL | Age: 75
Discharge: HOME | End: 2025-02-07
Attending: INTERNAL MEDICINE
Payer: MEDICARE

## 2025-02-04 DIAGNOSIS — C34.11 MALIGNANT NEOPLASM OF UPPER LOBE OF RIGHT LUNG (HCC): ICD-10-CM

## 2025-02-04 LAB — CREAT UR-MCNC: 0.9 MG/DL (ref 0.6–1.3)

## 2025-02-04 PROCEDURE — 6360000004 HC RX CONTRAST MEDICATION: Performed by: INTERNAL MEDICINE

## 2025-02-04 PROCEDURE — 82565 ASSAY OF CREATININE: CPT

## 2025-02-04 PROCEDURE — 71260 CT THORAX DX C+: CPT

## 2025-02-04 RX ORDER — IOPAMIDOL 612 MG/ML
80 INJECTION, SOLUTION INTRAVASCULAR
Status: COMPLETED | OUTPATIENT
Start: 2025-02-04 | End: 2025-02-04

## 2025-02-04 RX ADMIN — IOPAMIDOL 80 ML: 612 INJECTION, SOLUTION INTRAVENOUS at 11:07

## 2025-02-05 ENCOUNTER — HOSPITAL ENCOUNTER (OUTPATIENT)
Facility: HOSPITAL | Age: 75
Setting detail: RECURRING SERIES
Discharge: HOME | End: 2025-02-08

## 2025-02-05 PROCEDURE — G0239 OTH RESP PROC, GROUP: HCPCS

## 2025-02-05 PROCEDURE — G0237 THERAPEUTIC PROCD STRG ENDUR: HCPCS

## 2025-02-05 NOTE — PROGRESS NOTES
Pulmonary/RT Note    Visit #    35/36           Felicity Oquendo is a 74 y.o. female presented today for pulmonary rehab. She state that there have been no changes in medication or health since the last visit.          She has a target heart rate of . She tolerated the exercise session well and has a pain level of 2. Patient states RPE for session today was 14 and RPD was a 3. Today the pt did:      Nustep: 15 min, workload of 7  Arm bike: 15  min  Walkin min  Breathing retraining: 15 min  Recovery and monitoring:  15 min                 Physician available for emergencies: Dr. Mickey Marroquin, ARINA 2025 11:30 AM

## 2025-02-06 ENCOUNTER — APPOINTMENT (OUTPATIENT)
Facility: HOSPITAL | Age: 75
End: 2025-02-06
Payer: MEDICARE

## 2025-02-07 ENCOUNTER — HOSPITAL ENCOUNTER (OUTPATIENT)
Facility: HOSPITAL | Age: 75
Setting detail: RECURRING SERIES
Discharge: HOME | End: 2025-02-10
Payer: MEDICARE

## 2025-02-07 VITALS — OXYGEN SATURATION: 96 %

## 2025-02-07 PROCEDURE — 94618 PULMONARY STRESS TESTING: CPT

## 2025-02-07 ASSESSMENT — EJECTION FRACTION: EF_VALUE: 60

## 2025-02-07 ASSESSMENT — EXERCISE STRESS TEST
PEAK_BP: 137/82
PEAK_METS: 1.55
PEAK_RPE: 9
PEAK_HR: 76

## 2025-02-07 ASSESSMENT — PULMONARY FUNCTION TESTS
FVC (LITERS): 1.43
FEV1 (%PREDICTED): 91
FEV1/FVC: 68

## 2025-02-07 ASSESSMENT — PATIENT HEALTH QUESTIONNAIRE - PHQ9
SUM OF ALL RESPONSES TO PHQ QUESTIONS 1-9: 10
8. MOVING OR SPEAKING SO SLOWLY THAT OTHER PEOPLE COULD HAVE NOTICED. OR THE OPPOSITE, BEING SO FIGETY OR RESTLESS THAT YOU HAVE BEEN MOVING AROUND A LOT MORE THAN USUAL: NOT AT ALL
SUM OF ALL RESPONSES TO PHQ QUESTIONS 1-9: 10
5. POOR APPETITE OR OVEREATING: MORE THAN HALF THE DAYS
9. THOUGHTS THAT YOU WOULD BE BETTER OFF DEAD, OR OF HURTING YOURSELF: NOT AT ALL
SUM OF ALL RESPONSES TO PHQ QUESTIONS 1-9: 10
2. FEELING DOWN, DEPRESSED OR HOPELESS: SEVERAL DAYS
3. TROUBLE FALLING OR STAYING ASLEEP: MORE THAN HALF THE DAYS
SUM OF ALL RESPONSES TO PHQ QUESTIONS 1-9: 10
10. IF YOU CHECKED OFF ANY PROBLEMS, HOW DIFFICULT HAVE THESE PROBLEMS MADE IT FOR YOU TO DO YOUR WORK, TAKE CARE OF THINGS AT HOME, OR GET ALONG WITH OTHER PEOPLE: SOMEWHAT DIFFICULT
4. FEELING TIRED OR HAVING LITTLE ENERGY: NEARLY EVERY DAY
SUM OF ALL RESPONSES TO PHQ9 QUESTIONS 1 & 2: 3
1. LITTLE INTEREST OR PLEASURE IN DOING THINGS: MORE THAN HALF THE DAYS
7. TROUBLE CONCENTRATING ON THINGS, SUCH AS READING THE NEWSPAPER OR WATCHING TELEVISION: NOT AT ALL
6. FEELING BAD ABOUT YOURSELF - OR THAT YOU ARE A FAILURE OR HAVE LET YOURSELF OR YOUR FAMILY DOWN: NOT AT ALL

## 2025-02-07 ASSESSMENT — LIFESTYLE VARIABLES: ALCOHOL_USE: NO

## 2025-02-07 NOTE — PROGRESS NOTES
Assessment    Stages of Change Maintenance   Primary Tobacco Type --   Tobacco Use Status --   Quit --   Date Started --   Date Quit --   Quit Date Set --   # Cigarette Smoked/Day --   Smokeless Tobacco Use --   Amount --   Retired, Read Only Tobacco Use --   Tobacco Cessation Intervention    Smoking Cessation Referral No   Tobacco Adjunct Therapy --  [n/a]   Tobacco Cessation Education    Resource Information Provided No   Tobacco Triggers --   Tobacco Cessation Goals    Patient Target Goals --  [n/a]   Patient Treatment Goal --   Goal Status --   Progress Towards Goal --   Retired, Read Only Patient Stated Tobacco Goals --   Nutrition Assessment    Stages of Change Maintenance   Nutrition Assessment Tool --   Retired, Read Only Nutrition Assessment Tool --   Rate Your Plate Total Score 35   Current Diet --   Barriers to Heart Healthy Diet --   Alcohol No   Type Other (comment)   Retired, Read Only Type --   Amount (Drinks Per Week) --   Height 1.651 m (5' 5\")   Weight 102.3 kg (225 lb 9.6 oz)   BMI 37.62   Waist Circumference (In) 49.0 in   % Body Fat --   Lipid Management Assessment    Stages of Change --  [no recent panel]   Date Lipids Drawn --   Total --   HDL --   LDL --   Triglycerides --   Weight Management Assessment    Stages of Change --   Height 1.651 m (5' 5\")   Weight 102.3 kg (225 lb 9.6 oz)   BMI 37.62   Waist Circumference (In) 49.0 in   % Body Fat Loss/Gain --   Current Weight Loss/Gain Strategies --   Barriers to Weight Loss/Gain --   Nutrition Intervention    Dietitian Consult No   Staff/Patient Discussion No   Supplemental Nutrition --   Diabetes Education Referral No   Lipid Clinic Referral No   Nutrition Education    Education --   Comments --   Nutrition Goals    Patient Target Goals LDL-C less than 70 and non-HDL-C <100 for those with ASCVD, Triglycerides less than 150, HbA1C less than 7 percent for those with diabetes, Waist less than 40 inches males, 35 for females, Weight loss of 5-10%

## 2025-02-07 NOTE — PROGRESS NOTES
Cognitive:  N/A     Gait: Normal     Assistive device? N/a     Any joint or mobility limitations: no specific joint     Current mood assessment Content     Needs referral to referring MD for follow up? no     Barrier(s) to learning:  None             Target Goals        Patient name: Felicity Oquendo : 1950      Goals Timeframe Progress Comments   1. Education     Exercise capacity: Improve stamina for ADLs  Exercise Tolerance: Improve via developing and following individualized exercise plan  Symptoms with Exercise: Address symptoms that my limit exercise tolerance  60-90 days [] initial  [] met                             [] not met  [x] progressing     2. Infection prevention     Mucous Mobilization:   Assess difficulty of mucous mobilization  Provide educational material, Educate on mucous clearance techniques and devices     Infection Prevention:  Educate on importance of preventing recurrent infections  Provide information on infection prevention techniques  Decrease number/frequency of infections and hospitalizations  Encourage vaccinations 60-90 days    [] initial  [] met                             [] not met  [x] progressing        3. Self monitoring     Encourage vaccinations  Shortness of breath: Educate on pursed lip breathing, self-monitoring, interval training.  increase/decrease/maintain weight, weigh daily in clinic, increase vegetable /fiber intake, decrease saturated fats, exercise at least 2 days per week, provide nutrition information, and schedule nutrition consult     60-90 days [] initial  [] met                             [] not met  [x] progressing     4. Improve mental health status  anxiety/depression QOL: PHQ-9 assessments every 30 days if applicable  discuss anxiety/depression associated with lung disease  recognize patient improvements in ADLs, mobility, vital signs, mood and progress as they occur  60-90 days [] initial  [] met                             [] not met  [x]

## 2025-02-10 ENCOUNTER — APPOINTMENT (OUTPATIENT)
Facility: HOSPITAL | Age: 75
End: 2025-02-10
Payer: MEDICARE

## 2025-02-13 ENCOUNTER — APPOINTMENT (OUTPATIENT)
Facility: HOSPITAL | Age: 75
End: 2025-02-13
Payer: MEDICARE

## 2025-02-17 ENCOUNTER — APPOINTMENT (OUTPATIENT)
Facility: HOSPITAL | Age: 75
End: 2025-02-17
Payer: MEDICARE

## 2025-02-18 ENCOUNTER — OFFICE VISIT (OUTPATIENT)
Age: 75
End: 2025-02-18
Payer: MEDICARE

## 2025-02-18 VITALS
RESPIRATION RATE: 18 BRPM | DIASTOLIC BLOOD PRESSURE: 67 MMHG | OXYGEN SATURATION: 96 % | HEART RATE: 68 BPM | TEMPERATURE: 97.3 F | WEIGHT: 224.6 LBS | SYSTOLIC BLOOD PRESSURE: 153 MMHG | BODY MASS INDEX: 37.38 KG/M2

## 2025-02-18 DIAGNOSIS — J98.4 RESTRICTIVE LUNG DISEASE: ICD-10-CM

## 2025-02-18 DIAGNOSIS — J44.9 COPD, SEVERE (HCC): ICD-10-CM

## 2025-02-18 DIAGNOSIS — C34.91 NSCLC OF RIGHT LUNG (HCC): Primary | ICD-10-CM

## 2025-02-18 DIAGNOSIS — Z87.891 FORMER SMOKER: ICD-10-CM

## 2025-02-18 DIAGNOSIS — Z90.2 S/P LOBECTOMY OF LUNG: ICD-10-CM

## 2025-02-18 PROCEDURE — 1123F ACP DISCUSS/DSCN MKR DOCD: CPT | Performed by: INTERNAL MEDICINE

## 2025-02-18 PROCEDURE — 1160F RVW MEDS BY RX/DR IN RCRD: CPT | Performed by: INTERNAL MEDICINE

## 2025-02-18 PROCEDURE — 99214 OFFICE O/P EST MOD 30 MIN: CPT | Performed by: INTERNAL MEDICINE

## 2025-02-18 PROCEDURE — 1126F AMNT PAIN NOTED NONE PRSNT: CPT | Performed by: INTERNAL MEDICINE

## 2025-02-18 PROCEDURE — 1159F MED LIST DOCD IN RCRD: CPT | Performed by: INTERNAL MEDICINE

## 2025-02-18 RX ORDER — FLUTICASONE FUROATE, UMECLIDINIUM BROMIDE AND VILANTEROL TRIFENATATE 100; 62.5; 25 UG/1; UG/1; UG/1
1 POWDER RESPIRATORY (INHALATION) DAILY
Qty: 1 EACH | Refills: 0 | Status: SHIPPED | COMMUNITY
Start: 2025-02-18

## 2025-02-18 RX ORDER — FLUTICASONE FUROATE, UMECLIDINIUM BROMIDE AND VILANTEROL TRIFENATATE 100; 62.5; 25 UG/1; UG/1; UG/1
1 POWDER RESPIRATORY (INHALATION) DAILY
Qty: 3 EACH | Refills: 3 | Status: SHIPPED | OUTPATIENT
Start: 2025-02-18

## 2025-02-18 ASSESSMENT — ENCOUNTER SYMPTOMS
COUGH: 1
BACK PAIN: 0
EYE PAIN: 0
WHEEZING: 1
CHOKING: 0
EYE REDNESS: 0
BLOOD IN STOOL: 0
SORE THROAT: 0
EYE ITCHING: 0
RHINORRHEA: 0
PHOTOPHOBIA: 0
APNEA: 0
SHORTNESS OF BREATH: 1
SINUS PRESSURE: 0
VOICE CHANGE: 0
ABDOMINAL PAIN: 0
CHEST TIGHTNESS: 0
EYE DISCHARGE: 0
COLOR CHANGE: 0
TROUBLE SWALLOWING: 0
NAUSEA: 0
DIARRHEA: 0
STRIDOR: 0
CONSTIPATION: 0
VOMITING: 0

## 2025-02-18 NOTE — PROGRESS NOTES
Felicity Oquendo presents today for   Chief Complaint   Patient presents with    Follow-up Chronic Condition     Non-small cell carcinoma of lung, right       Is someone accompanying this pt?     Is the patient using any DME equipment during OV? No    -DME Company NA    Depression Screenin/7/2025    11:00 AM   PHQ-9 Questionaire   Little interest or pleasure in doing things 2   Feeling down, depressed, or hopeless 1   Trouble falling or staying asleep, or sleeping too much 2   Feeling tired or having little energy 3   Poor appetite or overeating 2   Feeling bad about yourself - or that you are a failure or have let yourself or your family down 0   Trouble concentrating on things, such as reading the newspaper or watching television 0   Moving or speaking so slowly that other people could have noticed. Or the opposite - being so fidgety or restless that you have been moving around a lot more than usual 0   Thoughts that you would be better off dead, or of hurting yourself in some way 0   PHQ-9 Total Score 10   If you checked off any problems, how difficult have these problems made it for you to do your work, take care of things at home, or get along with other people? 1       Learning Needs Questionnaire:     No question data found.      Fall Risk:          No data to display                 Abuse Screening:          No data to display                  Coordination of Care:  1. Have you been to the ER, urgent care clinic since your last visit? Hospitalized since your last visit? No    2. Have you seen or consulted any other health care providers outside of the [facility] since your last visit? Include any pap smears or colon screening. PCP    Medication list has been update per patient.        
   XANAX 0.5 MG tablet Take 1 tablet by mouth.      albuterol sulfate HFA (PROVENTIL HFA) 108 (90 Base) MCG/ACT inhaler Inhale 2 puffs into the lungs every 6 hours as needed for Wheezing 18 g 3     No current facility-administered medications on file prior to visit.     Allergies   Allergen Reactions    Atorvastatin      Other Reaction(s): myalgia    Simvastatin Myalgia     Family History   Problem Relation Age of Onset    Breast Cancer Sister     Cancer Brother     COPD Brother      Social History     Socioeconomic History    Marital status:      Spouse name: Not on file    Number of children: Not on file    Years of education: Not on file    Highest education level: Not on file   Occupational History    Not on file   Tobacco Use    Smoking status: Former     Current packs/day: 0.00     Average packs/day: 1 pack/day for 30.0 years (30.0 ttl pk-yrs)     Types: Cigarettes     Start date:      Quit date: 2004     Years since quittin.7     Passive exposure: Past    Smokeless tobacco: Never   Vaping Use    Vaping status: Never Used   Substance and Sexual Activity    Alcohol use: Yes     Comment: [place].    Drug use: Never    Sexual activity: Not on file   Other Topics Concern    Not on file   Social History Narrative    Not on file     Social Determinants of Health     Financial Resource Strain: Not on file   Food Insecurity: No Food Insecurity (2024)    Hunger Vital Sign     Worried About Running Out of Food in the Last Year: Never true     Ran Out of Food in the Last Year: Never true   Transportation Needs: No Transportation Needs (2024)    PRAPARE - Transportation     Lack of Transportation (Medical): No     Lack of Transportation (Non-Medical): No   Physical Activity: Not on file   Stress: Not on file   Social Connections: Not on file   Intimate Partner Violence: Not on file   Housing Stability: Low Risk  (2024)    Housing Stability Vital Sign     Unable to Pay for Housing in the

## 2025-02-20 ENCOUNTER — APPOINTMENT (OUTPATIENT)
Facility: HOSPITAL | Age: 75
End: 2025-02-20
Payer: MEDICARE

## 2025-02-24 ENCOUNTER — APPOINTMENT (OUTPATIENT)
Facility: HOSPITAL | Age: 75
End: 2025-02-24
Payer: MEDICARE

## 2025-03-04 ENCOUNTER — HOSPITAL ENCOUNTER (OUTPATIENT)
Facility: HOSPITAL | Age: 75
Setting detail: RECURRING SERIES
Discharge: HOME | End: 2025-03-07
Payer: MEDICARE

## 2025-03-04 VITALS — OXYGEN SATURATION: 96 %

## 2025-03-04 PROCEDURE — 94618 PULMONARY STRESS TESTING: CPT

## 2025-03-04 ASSESSMENT — PATIENT HEALTH QUESTIONNAIRE - PHQ9
1. LITTLE INTEREST OR PLEASURE IN DOING THINGS: MORE THAN HALF THE DAYS
SUM OF ALL RESPONSES TO PHQ QUESTIONS 1-9: 12
3. TROUBLE FALLING OR STAYING ASLEEP: MORE THAN HALF THE DAYS
SUM OF ALL RESPONSES TO PHQ QUESTIONS 1-9: 12
SUM OF ALL RESPONSES TO PHQ QUESTIONS 1-9: 12
8. MOVING OR SPEAKING SO SLOWLY THAT OTHER PEOPLE COULD HAVE NOTICED. OR THE OPPOSITE, BEING SO FIGETY OR RESTLESS THAT YOU HAVE BEEN MOVING AROUND A LOT MORE THAN USUAL: MORE THAN HALF THE DAYS
5. POOR APPETITE OR OVEREATING: MORE THAN HALF THE DAYS
SUM OF ALL RESPONSES TO PHQ QUESTIONS 1-9: 12
2. FEELING DOWN, DEPRESSED OR HOPELESS: SEVERAL DAYS
7. TROUBLE CONCENTRATING ON THINGS, SUCH AS READING THE NEWSPAPER OR WATCHING TELEVISION: NOT AT ALL
9. THOUGHTS THAT YOU WOULD BE BETTER OFF DEAD, OR OF HURTING YOURSELF: NOT AT ALL
6. FEELING BAD ABOUT YOURSELF - OR THAT YOU ARE A FAILURE OR HAVE LET YOURSELF OR YOUR FAMILY DOWN: NOT AT ALL
4. FEELING TIRED OR HAVING LITTLE ENERGY: NEARLY EVERY DAY

## 2025-03-04 ASSESSMENT — PULMONARY FUNCTION TESTS
FEV1 (%PREDICTED): 91
FVC (LITERS): 1.43
FEV1/FVC: 68

## 2025-03-04 ASSESSMENT — LIFESTYLE VARIABLES: ALCOHOL_USE: NO

## 2025-03-04 ASSESSMENT — EXERCISE STRESS TEST
PEAK_RPE: 11
PEAK_METS: 1
PEAK_BP: 142/82
PEAK_HR: 80

## 2025-03-04 ASSESSMENT — EJECTION FRACTION: EF_VALUE: 60

## 2025-03-04 NOTE — PROGRESS NOTES
Patient Interview:     Diagnosis:     Restrictive Lung Disease, COPD     Primary Care Physician:Danilo Willams MD     Pulmonologist: Maria L Santos     Cardiologist: n/a     How many times have you been hospitalized or gone to the Emergency Department in the past 12 months? yes      How many of those times were due to your lung condition? 5     Date of recent admission/ED visit: 8/28/24-9/2/24     Number of Lung infections in past 12 months? 0     Any history of chest/lung injury or surgery?yes   Rt. lobectomy , lung biopsy     Any upcoming surgeries scheduled:yes   eye surgery, Nov. 14, 2024     Any of the following diagnoses: DM II, lung nodules     Family History of Heart or Lung disease: yes         Do you smoke or currently live with a smoker? no  Have you ever smoked or lived with a smoker? yes   Quit 20 years  How much and for how long?     What is your home respiratory medication routine:               Controller Medications: Trelegy              Rescue Medications: Albuterol  Are you on home Oxygen:  n/a              Details:     Home medical equipment: n/a     Any allergies to food or medication: Atorvastatin, Simvatastin        Social  History & Family Component  Living Situation:     LIVES: with family:  spouse       Pets in the home? no     Does patient have family/friends able to provide support  yes         What type of home does patient live in? 2 Story; bedroom downstairs     Does patient have a yard?  yes              If yes, can patient care for yard?  no              If no, does patient require assistance to care for yard?  yes,      Transportation Resources:  Can patient drive themselves?  yes,               If no, who is primary ?                      Services/[place]/Visual Impaired: yes, reading glasses OTC   If yes, describe:     Occupation: Retired     Education/Grade Level: 12th grade     halfway/disability date: 1993        Occupational exposures:None     Psychosocial 
(comment)  [completed program]   Cardiac Knowledge Test Score --   Education Schedule Given No   Retired, Read Only Education Intervention    Education Schedule Given No   Patient Education    Education Activities of daily living, Breaking the dyspnea cycle, CAD, Bronchial hygiene/controlled cough   Comments --   Education Target Goals    Target Goals Correct demonstration of breathing techniques, Correct demonstration of MDI use and care, Correct demonstration/verbalization of O2 therapy   Retired, Read Only Patient Stated Education Goals --   Physician Response    MD Response --   Add or Change to Rehab Plan --   MD Signature --     MD Signature: _______________________________________________        Date/Time:  _______________________________        Pharmacist Signature:_________________________________________        Date/Time: _______________________________

## 2025-03-07 ENCOUNTER — HOSPITAL ENCOUNTER (OUTPATIENT)
Facility: HOSPITAL | Age: 75
Setting detail: RECURRING SERIES
Discharge: HOME | End: 2025-03-10
Payer: MEDICARE

## 2025-03-07 PROCEDURE — G0239 OTH RESP PROC, GROUP: HCPCS

## 2025-03-07 PROCEDURE — G0237 THERAPEUTIC PROCD STRG ENDUR: HCPCS

## 2025-03-07 NOTE — PROGRESS NOTES
Pulmonary/RT Note    Visit #               Felicity Oquendo is a 74 y.o. female presented today for pulmonary rehab. She state that there have been no changes in medication or health since the last visit.          She has a target heart rate of . She tolerated the exercise session well and has a pain level of 0. Patient states RPE for session today was 12 and RPD was a 3. Today the pt did:      Nustep: 15 min  Arm bike: 0 min  Walkin min  Resistance bands:  min   Breathing retraining: 15 min  Bodyweight:  min  Recovery: 15 min             Physician available for emergencies: Dr. Juvencio Colvin, RT 3/7/2025 10:36 AM2/.6

## 2025-03-10 ENCOUNTER — HOSPITAL ENCOUNTER (OUTPATIENT)
Facility: HOSPITAL | Age: 75
Setting detail: RECURRING SERIES
Discharge: HOME | End: 2025-03-13
Payer: MEDICARE

## 2025-03-10 PROCEDURE — G0237 THERAPEUTIC PROCD STRG ENDUR: HCPCS

## 2025-03-10 NOTE — PROGRESS NOTES
Pulmonary/RT Note    Visit #    3/36           Felicity Oquendo is a 74 y.o. female presented today for pulmonary rehab. He state that there have been no changes in medication or health since the last visit.          He has a target heart rate of . He tolerated the exercise session well and has a pain level of 0. Patient states RPE for session today was 13 and RPD was a 3. Today the pt did:      Arm bike: 15 min  Recumbent bike: 10 min  Breathing retraining: 15 min  Recovery and monitoring: 15 min  Education: 5 min  Exercise equipment orientation and benefits               Physician available for emergencies: Dr. Mickey Marroquin, ARINA 3/10/2025 11:30 AM

## 2025-03-12 ENCOUNTER — HOSPITAL ENCOUNTER (OUTPATIENT)
Facility: HOSPITAL | Age: 75
Setting detail: RECURRING SERIES
Discharge: HOME | End: 2025-03-15
Payer: MEDICARE

## 2025-03-12 PROCEDURE — G0237 THERAPEUTIC PROCD STRG ENDUR: HCPCS

## 2025-03-12 NOTE — PROGRESS NOTES
Pulmonary/RT Note    Visit #     4/36          Felicity Oquendo is a 74 y.o. female presented today for pulmonary rehab. She state that there have been no changes in medication or health since the last visit.          She has a target heart rate of . She tolerated the exercise session well and has a pain level of 1. Patient states RPE for session today was 11 and RPD was a 3. Today the pt did:        Arm bike: 15 min  Recumbent bike: 15 min level 1  Breathing retraining: 15 min  Recovery and monitoring: 15 min                 Physician available for emergencies: Dr. Prachi Marroquin, ARINA 3/12/2025 11:47 AM

## 2025-03-14 ENCOUNTER — HOSPITAL ENCOUNTER (OUTPATIENT)
Facility: HOSPITAL | Age: 75
Setting detail: RECURRING SERIES
Discharge: HOME | End: 2025-03-17
Payer: MEDICARE

## 2025-03-14 PROCEDURE — G0237 THERAPEUTIC PROCD STRG ENDUR: HCPCS

## 2025-03-14 NOTE — PROGRESS NOTES
Pulmonary/RT Note    Visit #               Felicity Oquendo is a 74 y.o. female presented today for pulmonary rehab. She state that there have been no changes in medication or health since the last visit.          She has a target heart rate of . She tolerated the exercise session well and has a pain level of 2. Patient states RPE for session today was 12 and RPD was a 3. Today the pt did:      Nustep: 0 min  Arm bike:  15 min  Walkin min  Resistance bands: 0 min   Breathing retraining:15 min  Bodyweight:  0 min  Bike: 15 min  Recovery: 15 min             Physician available for emergencies: Dr. Tabby Colvin, RT 3/14/2025 11:47 AM

## 2025-03-17 ENCOUNTER — HOSPITAL ENCOUNTER (OUTPATIENT)
Facility: HOSPITAL | Age: 75
Setting detail: RECURRING SERIES
Discharge: HOME | End: 2025-03-20
Payer: MEDICARE

## 2025-03-17 PROCEDURE — G0237 THERAPEUTIC PROCD STRG ENDUR: HCPCS

## 2025-03-17 NOTE — PROGRESS NOTES
Pulmonary/RT Note    Visit #               Felicity Oquendo is a 74 y.o. female presented today for pulmonary rehab. He state that there have been no changes in medication or health since the last visit.          She has a target heart rate of . She tolerated the exercise session well and has a pain level of 2 in her right shoulder. Patient states RPE for session today was 13 and RPD was a 3. Today the pt did:        Walkin min 1.0 mph  Recumbent bike: 15 min  Breathing retraining: 15 min  Recovery and monitoring:  15 min  Education: 5 min  Exercise equipment orientation and benefits               Physician available for emergencies: Dr. Aaron Marroquin, ARINA 3/17/2025 11:18 AM

## 2025-03-19 ENCOUNTER — HOSPITAL ENCOUNTER (OUTPATIENT)
Facility: HOSPITAL | Age: 75
Setting detail: RECURRING SERIES
Discharge: HOME | End: 2025-03-22
Payer: MEDICARE

## 2025-03-19 PROCEDURE — G0237 THERAPEUTIC PROCD STRG ENDUR: HCPCS

## 2025-03-19 NOTE — PROGRESS NOTES
Pulmonary/RT Note    Visit #               Felicity Oquendo is a 74 y.o. female presented today for pulmonary rehab. She state that there have been no changes in medication or health since the last visit.          She has a target heart rate of . She tolerated the exercise session well and has a pain level of 1 in her right rib. Patient states RPE for session today was 13 and RPD was a 3. Today the pt did:      Walkin min 1.0 MPH  Bike: 10 min  Weights:  10 min bicep curls w/press 2x10, arm raises: front, sit to stands w/ 4 lbs- 2x10   Breathing retraining:15 min  Recovery and monitoring:  15 min  Education: 5 min  Exercise equipment orientation and benefits               Physician available for emergencies: Dr. Aaron Marroquin, ARINA 3/19/2025 11:41 AM

## 2025-03-21 ENCOUNTER — HOSPITAL ENCOUNTER (OUTPATIENT)
Facility: HOSPITAL | Age: 75
Setting detail: RECURRING SERIES
Discharge: HOME | End: 2025-03-24
Payer: MEDICARE

## 2025-03-21 PROCEDURE — G0237 THERAPEUTIC PROCD STRG ENDUR: HCPCS

## 2025-03-21 NOTE — PROGRESS NOTES
Pulmonary/RT Note    Visit #               Felicity Oquendo is a 74 y.o. female presented today for pulmonary rehab. She state that there have been no changes in medication or health since the last visit.          She has a target heart rate of . She tolerated the exercise session well and has a pain level of 3. Patient states RPE for session today was 14 and RPD was a 3. Today the pt did:      Nustep:0 min  Arm bike: 10 min  Walkin min  Resistance bands/Weights:  5 min   Breathing retraining: 15 min  Bodyweight: 5 min  Recovery: 15 min             Physician available for emergencies: Dr. Aaron Colvin, RT 3/21/2025 11:14 AM

## 2025-03-24 ENCOUNTER — HOSPITAL ENCOUNTER (OUTPATIENT)
Facility: HOSPITAL | Age: 75
Setting detail: RECURRING SERIES
Discharge: HOME | End: 2025-03-27
Payer: MEDICARE

## 2025-03-24 PROCEDURE — G0237 THERAPEUTIC PROCD STRG ENDUR: HCPCS

## 2025-03-24 NOTE — PROGRESS NOTES
Pulmonary/RT Note    Visit #               Felicity Oquendo is a 74 y.o. female presented today for pulmonary rehab. She state that there have been no changes in medication or health since the last visit.          She has a target heart rate of . She tolerated the exercise session well and has a pain level of 4 in her lower back, she attended her granddaughter's ballet recital and she sat in the bleaches for a couple of hours. Her RPE for today's session was 14 and RPD was a 3. Today the pt did:      Arm bike:  15 min  Walkin min 1.2 mph  Weights:  5 min  Bodyweight: 2 min sit to stands  Breathing retraining: 15 min  Recovery and monitoring: 15 min                 Physician available for emergencies: Dr. Aaron Marroquin, ARINA 3/24/2025 11:19 AM

## 2025-03-26 ENCOUNTER — HOSPITAL ENCOUNTER (OUTPATIENT)
Facility: HOSPITAL | Age: 75
Setting detail: RECURRING SERIES
Discharge: HOME | End: 2025-03-29
Payer: MEDICARE

## 2025-03-26 PROCEDURE — G0237 THERAPEUTIC PROCD STRG ENDUR: HCPCS

## 2025-03-26 NOTE — PROGRESS NOTES
Pulmonary/RT Note    Visit #        10/36       Felicity Oquendo is a 74 y.o. female presented today for pulmonary rehab. She state that there have been no changes in medication or health since the last visit.          She has a target heart rate of . She tolerated the exercise session well and has a pain level of 1 in her lower back and rib. Patient states RPE for session today was 14 and RPD was a 3. Today the pt did:      Arm bike:  15 min  Walkin min 1.2 mph  Bodyweight: 5 min sit to stand  Weights: 5 min curls w/press, arm raises: front and side 2x10 ea  Breathing retraining: 15 min  Recovery and monitoring: 15 min                 Physician available for emergencies: Dr. Aaron Marroquin RCP 3/26/2025 11:27 AM

## 2025-03-28 ENCOUNTER — HOSPITAL ENCOUNTER (OUTPATIENT)
Facility: HOSPITAL | Age: 75
Setting detail: RECURRING SERIES
Discharge: HOME | End: 2025-03-31
Payer: MEDICARE

## 2025-03-28 PROCEDURE — G0237 THERAPEUTIC PROCD STRG ENDUR: HCPCS

## 2025-03-28 NOTE — PROGRESS NOTES
Pulmonary/RT Note    Visit #               Felicity Oquendo is a 74 y.o. female presented today for pulmonary rehab. She state that there have been no changes in medication or health since the last visit.          She has a target heart rate of . She tolerated the exercise session well and has a pain level of 1. Patient states RPE for session today was 13 and RPD was a 3. Today the pt did:      Nustep: 0 min  Arm bike: 15 min  Walkin min  Resistance bands/Weights: 5 min   Breathing retraining:15 min  Bodyweight: 5 min  Recovery/Monitoring: 15 min             Physician available for emergencies: Dr. Aaron Colvin, RT 3/28/2025 11:40 AM

## 2025-03-31 ENCOUNTER — HOSPITAL ENCOUNTER (OUTPATIENT)
Facility: HOSPITAL | Age: 75
Setting detail: RECURRING SERIES
Discharge: HOME | End: 2025-04-03
Payer: MEDICARE

## 2025-03-31 PROCEDURE — G0237 THERAPEUTIC PROCD STRG ENDUR: HCPCS

## 2025-03-31 ASSESSMENT — EJECTION FRACTION: EF_VALUE: 60

## 2025-03-31 ASSESSMENT — PATIENT HEALTH QUESTIONNAIRE - PHQ9
5. POOR APPETITE OR OVEREATING: SEVERAL DAYS
7. TROUBLE CONCENTRATING ON THINGS, SUCH AS READING THE NEWSPAPER OR WATCHING TELEVISION: NOT AT ALL
4. FEELING TIRED OR HAVING LITTLE ENERGY: SEVERAL DAYS
SUM OF ALL RESPONSES TO PHQ QUESTIONS 1-9: 4
3. TROUBLE FALLING OR STAYING ASLEEP: SEVERAL DAYS
SUM OF ALL RESPONSES TO PHQ QUESTIONS 1-9: 4
2. FEELING DOWN, DEPRESSED OR HOPELESS: NOT AT ALL
6. FEELING BAD ABOUT YOURSELF - OR THAT YOU ARE A FAILURE OR HAVE LET YOURSELF OR YOUR FAMILY DOWN: NOT AT ALL
9. THOUGHTS THAT YOU WOULD BE BETTER OFF DEAD, OR OF HURTING YOURSELF: NOT AT ALL
8. MOVING OR SPEAKING SO SLOWLY THAT OTHER PEOPLE COULD HAVE NOTICED. OR THE OPPOSITE, BEING SO FIGETY OR RESTLESS THAT YOU HAVE BEEN MOVING AROUND A LOT MORE THAN USUAL: NOT AT ALL
1. LITTLE INTEREST OR PLEASURE IN DOING THINGS: SEVERAL DAYS
10. IF YOU CHECKED OFF ANY PROBLEMS, HOW DIFFICULT HAVE THESE PROBLEMS MADE IT FOR YOU TO DO YOUR WORK, TAKE CARE OF THINGS AT HOME, OR GET ALONG WITH OTHER PEOPLE: NOT DIFFICULT AT ALL

## 2025-03-31 ASSESSMENT — EXERCISE STRESS TEST
PEAK_HR: 80
PEAK_RPE: 11
PEAK_METS: 1
PEAK_BP: 142/82

## 2025-03-31 ASSESSMENT — PULMONARY FUNCTION TESTS
FEV1/FVC: 68
FVC (LITERS): 1.43
FEV1 (%PREDICTED): 91

## 2025-03-31 ASSESSMENT — LIFESTYLE VARIABLES: ALCOHOL_USE: NO

## 2025-03-31 NOTE — PROGRESS NOTES
Felicity Oquendo #[id number] (CSN:[id number]) (74 y.o. F) (Adm: )  [facility]  P[facility]P    Flowsheet Row CARDIO PULMONARY REHAB from 3/31/2025 in [facility]MON[facility] REH[facility]   Treatment Diagnosis    Treatment Diagnosis 1 Other (Comments)  [Restrictive Lung Disease]   Treatment Diagnosis 2 COPD   Referral Date 10/16/24   Significant Cardiovascular History --   Co-morbidities Diabetes, Malignancy, Pulmonary disease   Oxygen Saturation / Titration    Stages of Change --   Oxygen Assessment    Stages of Change --   Oxygen Type --  [n/a]   Individual Treatment Plan    ITP Visit Type Re assessment   1st Date of Exercise 02/18/25   ITP Next Review Date 04/28/25   Visit #/Total Visits 11/36   EF% 60 %   FVC (Liters) 1.43 liters   FEV1 (%PRED) 91   FEV1/FVC 68   DLCO (mL/mmHg sec) 14.36 ml/mmHg sec   Risk Stratification --   Pulmonary ITP --   Exercise    Lee Total Score --   Stages of Change Preparation, Action   Exercise Test Six minute walk test   Distance Calculated in mi   Distance (Feet-Actual) 475 ft   Distance (miles) 0.09   Distance (Feet-Calculated) 475.2 ft   Distance (meters) --   Peak HR 80   Peak /82   Peak RPE 11   Peak Mets 1   SpO2 --   O2 Flow Rate (l/min) --   Stops 0   SPO2 Range 96-98   DASI Total Score --   BMI (Calculated) --   FEV1 (%PRED) 91   CHARLES Total Score --   Exercise Prescription    Mode Treadmill, Bike, Stepper, Ergometer   Frequency per week 2   Duration Per Session 60   Intensity METS       1.55   Progression slow   SpO2 --   O2 Device Room air   O2 Flow Rate (l/min) --   Retired, Read Only FIO2 (%) --   O2 Temperature --   Comments --   Symptoms with Exercise Shortness of breath   Target Heart Rate    Resistance Training Yes   Weight --   Reps --   Assisted Devices None   Retired, Read Only Exercise Blood Pressures    Retired, Read Only Resting BP --   Retired, Read Only Peak BP --   Retired, Read Only Is BP WDL --   Exercise Intervention    Type none   Frequency 0   Duration 0

## 2025-03-31 NOTE — PROGRESS NOTES
Pulmonary/RT Note    Visit #    12/36           Felicity Oquendo is a 74 y.o. female presented today for pulmonary rehab. She state that there have been  changes in medication since the last visit.  She stopped taking her Januvia due to side effects on Friday under her primary care doctor and she have a follow up appointment in one month.      She will not be attending rehab on Wednesday 4/2/25.  She will be going on vacation and will not be here from 4/7/25 to 4/16/25, she will be returning to rehab on 4/16/25.           She has a target heart rate of . She tolerated the exercise session well and has a pain level of 1 in her left calf. Patient states RPE for session today was 14 and RPD was a 3. Today the pt did:        Arm bike: 15 min  Walking:  10 min 1.2 mph  Weights: 5 min  Bodyweight: 5 min  Breathing retraining: 15 min  Recovery and monitoring:  15 min      Cardiopulmonary Rehab Check-in Questionnaire         Do you exercise at home?  [] Yes          [x]  No      If yes, how many minutes?    what do you do?         Do you smoke [] Yes           [x] No         VAPE  []             use recreational []      (Check if use)    If so how many pks a day? _____________________________      Do you wear a nicotine patch?        In what ways are you benefiting from pulmonary rehab? Walking further without as bad of shortness of breath        Do you have any new goals?  If yes explain: same, continue to increase walking and exercise             Physician available for emergencies: Dr. Mickey Marroquin, ARINA 3/31/2025 11:21 AM

## 2025-04-01 ENCOUNTER — TRANSCRIBE ORDERS (OUTPATIENT)
Dept: SCHEDULING | Age: 75
End: 2025-04-01

## 2025-04-01 DIAGNOSIS — Z12.31 ENCOUNTER FOR SCREENING MAMMOGRAM FOR MALIGNANT NEOPLASM OF BREAST: Primary | ICD-10-CM

## 2025-04-02 ENCOUNTER — APPOINTMENT (OUTPATIENT)
Facility: HOSPITAL | Age: 75
End: 2025-04-02
Payer: MEDICARE

## 2025-04-04 ENCOUNTER — APPOINTMENT (OUTPATIENT)
Facility: HOSPITAL | Age: 75
End: 2025-04-04
Payer: MEDICARE

## 2025-04-04 ENCOUNTER — HOSPITAL ENCOUNTER (OUTPATIENT)
Facility: HOSPITAL | Age: 75
Setting detail: RECURRING SERIES
Discharge: HOME | End: 2025-04-07

## 2025-04-04 NOTE — PROGRESS NOTES
Pulmonary/RT Note    Visit #        13/36       Felicity Oquendo is a 74 y.o. female presented today for pulmonary rehab. She state that there have been no changes in medication or health since the last visit.          She has a target heart rate of . She tolerated the exercise session well and has a pain level of 1. Patient states RPE for session today was 14 and RPD was a 3. Today the pt did:      Nustep: 0 min  Arm bike:  15 min  Walking:  10 min  Resistance bands/ Weight:  5 min   Breathing retraining: 10 min  Bodyweight:  5 min  Recovery/Monitoring: 15 min             Physician available for emergencies: Dr. Prachi Colvin, RT 4/4/2025 12:00 PM

## 2025-04-07 ENCOUNTER — APPOINTMENT (OUTPATIENT)
Facility: HOSPITAL | Age: 75
End: 2025-04-07
Payer: MEDICARE

## 2025-04-09 ENCOUNTER — APPOINTMENT (OUTPATIENT)
Facility: HOSPITAL | Age: 75
End: 2025-04-09
Payer: MEDICARE

## 2025-04-11 ENCOUNTER — APPOINTMENT (OUTPATIENT)
Facility: HOSPITAL | Age: 75
End: 2025-04-11
Payer: MEDICARE

## 2025-04-14 ENCOUNTER — APPOINTMENT (OUTPATIENT)
Facility: HOSPITAL | Age: 75
End: 2025-04-14
Payer: MEDICARE

## 2025-04-16 ENCOUNTER — HOSPITAL ENCOUNTER (OUTPATIENT)
Facility: HOSPITAL | Age: 75
Setting detail: RECURRING SERIES
Discharge: HOME | End: 2025-04-19
Payer: MEDICARE

## 2025-04-16 PROCEDURE — G0237 THERAPEUTIC PROCD STRG ENDUR: HCPCS

## 2025-04-16 PROCEDURE — G0239 OTH RESP PROC, GROUP: HCPCS

## 2025-04-16 NOTE — PROGRESS NOTES
Pulmonary/RT Note    Visit #        14/       Felicity Oquendo is a 74 y.o. female presented today for pulmonary rehab. Today is her first day back to pulmonary rehab in a week, she was on vacation.         She has a target heart rate of . She tolerated the exercise session well and has a pain level of 1. Patient states RPE for session today was 13 and RPD was a 3. Today the pt did:      Arm bike: 10 min  Walking: 10 min  Recumbent bike: 5 min  Breathing retraining: 10 min  Recovery and monitorin min  Education: 5 min  Pursed lip breathing  Medications (BP and daily monitoring)  Exercise equipment orientation and benefits  Self pacing and energy conservation                 Physician available for emergencies: Dr Mickey Feliz RCP 2025 12:47 PM

## 2025-04-21 ENCOUNTER — HOSPITAL ENCOUNTER (OUTPATIENT)
Facility: HOSPITAL | Age: 75
Setting detail: RECURRING SERIES
Discharge: HOME | End: 2025-04-24
Payer: MEDICARE

## 2025-04-21 PROCEDURE — G0237 THERAPEUTIC PROCD STRG ENDUR: HCPCS

## 2025-04-21 PROCEDURE — G0239 OTH RESP PROC, GROUP: HCPCS

## 2025-04-21 NOTE — PROGRESS NOTES
Pulmonary/RT Note    Visit #     15/36          Felicity Oquendo is a 74 y.o. female presented today for pulmonary rehab. She state that there have been no changes in medication or health since the last visit.          She has a target heart rate of . She tolerated the exercise session well and has a pain level of 5 in her back. Patient states RPE for session today was 14 and RPD was a 3. Today the pt did:      Arm bike: 10 min w/workload of 6  Walking: 10 min  Weights:  5 min 3 lbs  Breathing retraining: 15 min  Recovery and monitoring: 15 min                 Physician available for emergencies: Dr. Aaron Marroquin, ARINA 4/21/2025 11:46 AM

## 2025-04-23 ENCOUNTER — HOSPITAL ENCOUNTER (OUTPATIENT)
Facility: HOSPITAL | Age: 75
Setting detail: RECURRING SERIES
Discharge: HOME | End: 2025-04-26
Payer: MEDICARE

## 2025-04-23 PROCEDURE — G0239 OTH RESP PROC, GROUP: HCPCS

## 2025-04-23 PROCEDURE — G0237 THERAPEUTIC PROCD STRG ENDUR: HCPCS

## 2025-04-23 NOTE — PROGRESS NOTES
Pulmonary/RT Note    Visit #   16/36            Felicity Oquendo is a 74 y.o. female presented today for pulmonary rehab. She state that there have been no changes in medication or health since the last visit.          She has a target heart rate of . He tolerated the exercise session well and has a pain level of 1. Patient states RPE for session today was 14 and RPD was a 3. Today the pt did:        Arm bike:  10 min workload 6  Walking: 10 min, 1.3 5 min  and 1.5 mph for 5 min  Recumbent bike: 10 min level 2  Breathing retraining: 10 min  Recovery and monitoring: 15 min                 Physician available for emergencies: Dr. Mickey Marroquin, ARINA 4/23/2025 11:41 AM

## 2025-04-25 ENCOUNTER — HOSPITAL ENCOUNTER (OUTPATIENT)
Facility: HOSPITAL | Age: 75
Setting detail: RECURRING SERIES
Discharge: HOME | End: 2025-04-28
Payer: MEDICARE

## 2025-04-25 PROCEDURE — G0237 THERAPEUTIC PROCD STRG ENDUR: HCPCS

## 2025-04-25 PROCEDURE — G0239 OTH RESP PROC, GROUP: HCPCS

## 2025-04-25 NOTE — PROGRESS NOTES
Pulmonary/RT Note    Visit #        17//36       Felicity Oquendo is a 74 y.o. female presented today for pulmonary rehab. She state that there have been no changes in medication or health since the last visit.          She has a target heart rate of . She tolerated the exercise session well and has a pain level of 1. Patient states RPE for session today was 13 and RPD was a 4. Today the pt did:      Nustep:0 min  Arm bike: 15 min  Walking:  10 min  Resistance bands:  min   Breathing retraining: 15 min  Bodyweight: 0 min  Recover/ Monitoring: 15 min             Physician available for emergencies: Dr. Mickey Colvin, RT 4/25/2025 11:27 AM

## 2025-04-28 ENCOUNTER — HOSPITAL ENCOUNTER (OUTPATIENT)
Facility: HOSPITAL | Age: 75
Setting detail: RECURRING SERIES
Discharge: HOME | End: 2025-05-01
Payer: MEDICARE

## 2025-04-28 PROCEDURE — G0237 THERAPEUTIC PROCD STRG ENDUR: HCPCS

## 2025-04-29 VITALS — OXYGEN SATURATION: 96 %

## 2025-04-29 ASSESSMENT — EXERCISE STRESS TEST
PEAK_HR: 80
PEAK_BP: 142/82
PEAK_METS: 1
PEAK_RPE: 11

## 2025-04-29 ASSESSMENT — EJECTION FRACTION: EF_VALUE: 60

## 2025-04-29 ASSESSMENT — PULMONARY FUNCTION TESTS
FVC (LITERS): 1.43
FEV1 (%PREDICTED): 91
FEV1/FVC: 68

## 2025-04-29 ASSESSMENT — LIFESTYLE VARIABLES: ALCOHOL_USE: NO

## 2025-04-29 NOTE — PROGRESS NOTES
Pulmonary/RT Note    Visit #   18/36            Felicity Oquendo is a 74 y.o. female presented today for pulmonary rehab. She state that there have been no changes in medication or health since the last visit.          She has a target heart rate of . He tolerated the exercise session well and has a pain level of 1. Patient states RPE for session today was 14 and RPD was a 3. Today the pt did:        Arm bike:  15 min workload 6  Walking: 10 min, 1.3 5 min  and 1.5 mph for 5 min  Weights: 2.5 min 4 lb 10 x curls, sit to stands: 2.5 min  Breathing retraining: 15 min  Recovery and monitoring: 15 min                 Physician available for emergencies: Dr. Aaron Marroquin RCP 4/28/2025 11:20 AM  
--   BP Meds --   Physician Notified --   Pulmonary Medications    Resource Information Provided --   Med(s) Change No   Bronchodilator Prescribed Yes   Bronchodilator Adherent Yes   Corticosteriod Prescribed No   Corticosteroid Adherent No   Combination (Duo) Medication Prescribed Yes   Combination Medication Adherent Yes   Anticholinergic Prescribed No   Anticholinergic Adherent No   Vasodilator Prescribed No   Vasodilator Adherent No   Antifibrotic Agent Prescribed No   Antifibrotic Agent Adherent No   Other Pulmonary Core Components    Pulmonary Risk Factors Managed As Other Core Component Altered sleep, Environmental factors, Medication compliance, Prevention/management of respiratory infections and exacerbations, Pulmonary hygiene   Medication Compliance Assessment    Stages of Change Action   Medication Changes No   Barriers to Medication Compliance none   Current Pulmonary/Respiratory Medications --   Medication Compliance Interventions    Staff/Patient Discussion No   Medications Reviewed With Patient No   Other Intervention --   Medication Compliance Education    Resource Information Provided --   Education Cleaning techniques, COPD/Asthma action plan, How to use a spacer, How to use MDI/DPI/nebulizer, Knowledge of bronchodilators/corticosteroids (ICS), Medication interactions, Medication purpose and dosage, Medication storage and handling, Potential side effects   Medication Compliance Goals    Patient Target Goals Demonstrates medication compliance, Verbalizes correct administration method, Verbalizes understanding of medication purpose   Patient Treatment Goal --   Goal Status In progress   Progress Towards Goal --   Patient Treatment Goal (New) --   Goal Status (New) --   Progress Towards Goal (New) --   Environmental Factors Assessment    Environmental Factor Identified --  [n/a]   Stages of Change Maintenance   Source of Exposure --   Frequency of Exposure --   Duration of Exposure --   Environmental

## 2025-04-30 ENCOUNTER — HOSPITAL ENCOUNTER (OUTPATIENT)
Facility: HOSPITAL | Age: 75
Setting detail: RECURRING SERIES
Discharge: HOME | End: 2025-05-03
Payer: MEDICARE

## 2025-04-30 PROCEDURE — G0237 THERAPEUTIC PROCD STRG ENDUR: HCPCS

## 2025-04-30 NOTE — PROGRESS NOTES
Pulmonary/RT Note    Visit #    19/36           Felicity Oquendo is a 74 y.o. female presented today for pulmonary rehab. She state that there have been no changes in medication or health since the last visit.     She will be out of town from May 5 and will return to rehab on May 23, 2025. She plans to keep up her regimen of walking.          She has a target heart rate of . She tolerated the exercise session well and has a pain level of 3 in her upper back. Patient states RPE for session today was 13 and RPD was a 4. Today the pt did:      Arm bike:  15 min  Walking: 10 min  Weights: 5 min  Breathing retraining:15 min  Recovery and monitoring:  15 min                 Physician available for emergencies: Dr. Aaron Marroquin RCP 4/30/2025 11:35 AM

## 2025-05-02 ENCOUNTER — APPOINTMENT (OUTPATIENT)
Facility: HOSPITAL | Age: 75
End: 2025-05-02
Payer: MEDICARE

## 2025-05-05 ENCOUNTER — APPOINTMENT (OUTPATIENT)
Facility: HOSPITAL | Age: 75
End: 2025-05-05
Payer: MEDICARE

## 2025-05-07 ENCOUNTER — APPOINTMENT (OUTPATIENT)
Facility: HOSPITAL | Age: 75
End: 2025-05-07
Payer: MEDICARE

## 2025-05-09 ENCOUNTER — APPOINTMENT (OUTPATIENT)
Facility: HOSPITAL | Age: 75
End: 2025-05-09
Payer: MEDICARE

## 2025-05-12 ENCOUNTER — APPOINTMENT (OUTPATIENT)
Facility: HOSPITAL | Age: 75
End: 2025-05-12
Payer: MEDICARE

## 2025-05-14 ENCOUNTER — APPOINTMENT (OUTPATIENT)
Facility: HOSPITAL | Age: 75
End: 2025-05-14
Payer: MEDICARE

## 2025-05-16 ENCOUNTER — APPOINTMENT (OUTPATIENT)
Facility: HOSPITAL | Age: 75
End: 2025-05-16
Payer: MEDICARE

## 2025-05-19 ENCOUNTER — APPOINTMENT (OUTPATIENT)
Facility: HOSPITAL | Age: 75
End: 2025-05-19
Payer: MEDICARE

## 2025-05-21 ENCOUNTER — APPOINTMENT (OUTPATIENT)
Facility: HOSPITAL | Age: 75
End: 2025-05-21
Payer: MEDICARE

## 2025-05-21 ENCOUNTER — HOSPITAL ENCOUNTER (OUTPATIENT)
Facility: HOSPITAL | Age: 75
Setting detail: RECURRING SERIES
Discharge: HOME | End: 2025-05-24
Payer: MEDICARE

## 2025-05-21 NOTE — PROGRESS NOTES
Pulmonary/RT Note    Visit #    20/36           Felicity Oquendo is a 74 y.o. female presented today for pulmonary rehab. She state that there have been no changes in medication or health since the last visit.          She has a target heart rate of . She tolerated the exercise session well and has a pain level of 0. Patient states RPE for session today was 14 and RPD was a 3. Today the pt did:      Arm bike:  10 min  Walking:  15 min  Weights: 5 min 3 lbs  Bodyweight: 5 min  Breathing retraining: 15 min  Recovery and monitoring:  15 min                 Physician available for emergencies: Dr. Mickey Marroquin RCP 5/21/2025 11:12 AM

## 2025-05-23 ENCOUNTER — HOSPITAL ENCOUNTER (OUTPATIENT)
Facility: HOSPITAL | Age: 75
Setting detail: RECURRING SERIES
Discharge: HOME | End: 2025-05-26
Payer: MEDICARE

## 2025-05-23 PROCEDURE — G0237 THERAPEUTIC PROCD STRG ENDUR: HCPCS

## 2025-05-23 NOTE — PROGRESS NOTES
Pulmonary/RT Note    Visit #    21/36           Felicity Oquendo is a 74 y.o. female presented today for pulmonary rehab. She state that there have been no changes in medication or health since the last visit.          She has a target heart rate of . She tolerated the exercise session well and has a pain level of 0. Patient states RPE for session today was 13 and RPD was a 3. Today the pt did:      Nustep:0 min  Arm bike: 15 min  Walking: 10 min  Resistance bands/Weights: 5 min   Breathing retraining: 15 min  Bodyweight:  5 min  Recovery: 10 min             Physician available for emergencies: Dr. Alma Rosa Colvin, RT 5/23/2025 11:38 AM

## 2025-05-27 ASSESSMENT — PULMONARY FUNCTION TESTS
FVC (LITERS): 1.43
FEV1/FVC: 68
FEV1 (%PREDICTED): 91

## 2025-05-27 ASSESSMENT — EXERCISE STRESS TEST
PEAK_HR: 80
PEAK_BP: 142/82
PEAK_RPE: 11
PEAK_METS: 1

## 2025-05-27 ASSESSMENT — LIFESTYLE VARIABLES: ALCOHOL_USE: NO

## 2025-05-27 ASSESSMENT — EJECTION FRACTION: EF_VALUE: 60

## 2025-05-27 NOTE — PROGRESS NOTES
Felicity Oquendo #[id number] (CSN:[id number]) (74 y.o. F) (Adm: 05/23/25)  [facility]  Pulmonary ITP    Flowsheet Row CARDIO PULMONARY REHAB from 5/23/2025 in [facility] PULMONARY REHAB   Treatment Diagnosis    Treatment Diagnosis 1 Other (comment)  [Restrive Lung Disease]   Transplant Date --   Covid Date --   Treatment Diagnosis 2 COPD   Transplant Date --   Covid Date --   Referral Date 10/16/24   Gold Grade --   PFT Date 10/14/24   FVC (Liters) 1.43 liters   FEV1 (%PRED) 91   FEV1/FVC 68   DLCO (mL/mmHg sec) 14.36 ml/mmHg sec   Significant Cardiovascular History --   NYHA Heart Failure Classification --   Co-morbidities Diabetes, Malignancy, Pulmonary disease   Individual Treatment Plan    ITP Visit Type Re-assessment   ITP Next Review Date 06/23/25   Visit #/Total Visits 21/36   EF% 60 %   Risk Stratification --   Retired, Read Only 1st Date of Exercise --   Retired, Read Only Pulmonary ITP --   Supplemental Oxygen    Oxygen Use --   Oxygen Assessment    Stages of Change Other  [Pt. does not wear oxygen]   Oxygen Type --  [n/a]   Oxygen Compliant --   O2 Flow Rate (l/min) - Rest --   O2 Flow Rate (l/min) - Exercise --   O2 Flow Rate (l/min) - Sleep --   Breath Sounds --   O2 Sat Greater Than or Equal to 90% --   Retired, Read Only Patients Liter Flow --   Oxygen Intervention    Prescribed Oxygen Flow Rate --   Prescribed SpO2 --   Titration Plan --   Interventions --   Staff/Patient Discussion --   Oxygen Education    Oxygen Education --   Oxygen Goals    Patient Target Goals --   Patient Treatment Goal --   Goal Status --   Progress Towards Goal --   Patient Treatment Goal (New) --   Goal Status (New) --   Progress Towards Goal (New) --   Exercise Assessment    Stages of Change Action   [employer] Shortness of Breath Questionnaire Total Score --   Assisted Devices None   Lee Total Score --   Lee Fall Risk --   Test Six minute walk test   Data Measured Before Test    Heart Rate --   Resting Blood Pressure --   SpO2 --   O2

## 2025-05-28 ENCOUNTER — HOSPITAL ENCOUNTER (OUTPATIENT)
Facility: HOSPITAL | Age: 75
Setting detail: RECURRING SERIES
Discharge: HOME | End: 2025-05-31
Payer: MEDICARE

## 2025-05-28 PROCEDURE — G0239 OTH RESP PROC, GROUP: HCPCS

## 2025-05-28 PROCEDURE — G0237 THERAPEUTIC PROCD STRG ENDUR: HCPCS

## 2025-05-28 NOTE — PROGRESS NOTES
Pulmonary/RT Note    Visit #     22/36          Felicity Oquendo is a 74 y.o. female presented today for pulmonary rehab. She state that there have been no changes in medication or health since the last visit.          She has a target heart rate of . She tolerated the exercise session well and has a pain level of 0. Patient states RPE for session today was 14 and RPD was a 3. Today the pt did:      Arm bike: 10 min  Walking: 10 min  Recumbent bike: 5 min  Weights/bodyweight: 10 min, arm raises forward and side, bicep curls/ press  Breathing retraining: 15 min  Recovery and monitoring: 10 min                 Physician available for emergencies: Dr. Prachi Marroquin RCP 5/28/2025 11:10 AM

## 2025-05-30 ENCOUNTER — HOSPITAL ENCOUNTER (OUTPATIENT)
Facility: HOSPITAL | Age: 75
Setting detail: RECURRING SERIES
End: 2025-05-30
Payer: MEDICARE

## 2025-05-30 PROCEDURE — G0237 THERAPEUTIC PROCD STRG ENDUR: HCPCS

## 2025-05-30 NOTE — PROGRESS NOTES
Pulmonary/RT Note    Visit #        23/36       Felicity Oquendo is a 74 y.o. female presented today for pulmonary rehab. She state that there have been no changes in medication or health since the last visit.          She has a target heart rate of . She tolerated the exercise session well and has a pain level of 0. Patient states RPE for session today was 14 and RPD was a 3. Today the pt did:      Nustep: 0 min  Arm bike: 10 min  Walking:  10 min  Resistance bands/Weights: 5  min   Breathing retraining: 10 min  Bodyweight: 5 min  Bike: 10 min  Recovery: 10 min             Physician available for emergencies: Dr. Courtney Colvin, RT 5/30/2025 11:16 AM

## 2025-06-02 ENCOUNTER — HOSPITAL ENCOUNTER (OUTPATIENT)
Facility: HOSPITAL | Age: 75
Setting detail: RECURRING SERIES
Discharge: HOME | End: 2025-06-05
Payer: MEDICARE

## 2025-06-02 PROCEDURE — G0239 OTH RESP PROC, GROUP: HCPCS

## 2025-06-02 PROCEDURE — G0237 THERAPEUTIC PROCD STRG ENDUR: HCPCS

## 2025-06-02 NOTE — PROGRESS NOTES
Pulmonary/RT Note    Visit #    24/36           Felicity Oquendo is a 74 y.o. female presented today for pulmonary rehab. She state that there have been no changes in medication or health since the last visit.          She has a target heart rate of . She tolerated the exercise session well and has a pain level of 0. Patient states RPE for session today was 14 and RPD was a 4. Today the pt did:      Arm bike: 10 min  Walking:  10 min 1.5 mph  Recumbent bike: 10 min  Weights: 5 min  Breathing retraining: 10  min  Recovery and monitoring:  10 min                 Physician available for emergencies: Dr. Mickey Marroquin, ARINA 6/2/2025 1:29 PM

## 2025-06-04 ENCOUNTER — HOSPITAL ENCOUNTER (OUTPATIENT)
Facility: HOSPITAL | Age: 75
Setting detail: RECURRING SERIES
Discharge: HOME | End: 2025-06-07
Payer: MEDICARE

## 2025-06-04 PROCEDURE — G0237 THERAPEUTIC PROCD STRG ENDUR: HCPCS

## 2025-06-04 NOTE — PROGRESS NOTES
Pulmonary/RT Note    Visit #               Felicity Oquendo is a 74 y.o. female presented today for pulmonary rehab. She state that there have been no changes in medication or health since the last visit.          She has a target heart rate of . She tolerated the exercise session well and has a pain level of 3 in her rib and she had a headache earlier today and her blood pressure was elevated,however she does have more sinus pressure today. Her pain post was a 2/10.    She does not take blood pressure medication, although there has been discussion about her metoprolol being either changed vs adjusted during her upcoming appointment.    She shared this happens prior to having a vertigo episode. She has an ENT appt.  Next week and she will record her blood pressures while at home    Her session was limited seated exercises.  Patient states RPE for session today was 14 and RPD was a 3. Today the pt did:      Arm bike: 12 min  Breathing retraining: 10 min  Recovery and monitorin min  Education: 5 min  Medications                Physician available for emergencies: Dr. Mickey Marroquin RCP 2025 11:27 AM

## 2025-06-06 ENCOUNTER — HOSPITAL ENCOUNTER (OUTPATIENT)
Facility: HOSPITAL | Age: 75
Setting detail: RECURRING SERIES
Discharge: HOME | End: 2025-06-09
Payer: MEDICARE

## 2025-06-06 ENCOUNTER — HOSPITAL ENCOUNTER (OUTPATIENT)
Facility: HOSPITAL | Age: 75
Discharge: HOME | End: 2025-06-09
Attending: INTERNAL MEDICINE
Payer: MEDICARE

## 2025-06-06 VITALS — WEIGHT: 230 LBS | HEIGHT: 65 IN | BODY MASS INDEX: 38.32 KG/M2

## 2025-06-06 DIAGNOSIS — Z12.31 ENCOUNTER FOR SCREENING MAMMOGRAM FOR MALIGNANT NEOPLASM OF BREAST: ICD-10-CM

## 2025-06-06 PROCEDURE — G0239 OTH RESP PROC, GROUP: HCPCS

## 2025-06-06 PROCEDURE — G0237 THERAPEUTIC PROCD STRG ENDUR: HCPCS

## 2025-06-06 PROCEDURE — 77063 BREAST TOMOSYNTHESIS BI: CPT

## 2025-06-06 NOTE — PROGRESS NOTES
Pulmonary/RT Note    Visit #        26/36       Felicity Oquendo is a 74 y.o. female presented today for pulmonary rehab. She state that there have been no changes in medication or health since the last visit.          She has a target heart rate of . She tolerated the exercise session well and has a pain level of 4. Patient states RPE for session today was 12 and RPD was a 3. Today the pt did:      Nustep:0 min  Arm bike: 10 min  Walking:  10 min  Resistance bands:  0 min   Breathing retraining:10 min  Bodyweight:  0 min  Recovery: 20 min             Physician available for emergencies: Dr. Prachi Colvin, RT 6/6/2025 11:22 AM

## 2025-06-09 ENCOUNTER — HOSPITAL ENCOUNTER (OUTPATIENT)
Facility: HOSPITAL | Age: 75
Setting detail: RECURRING SERIES
Discharge: HOME | End: 2025-06-12
Payer: MEDICARE

## 2025-06-09 PROCEDURE — G0237 THERAPEUTIC PROCD STRG ENDUR: HCPCS

## 2025-06-09 NOTE — PROGRESS NOTES
Pulmonary/RT Note    Visit #   27/36            Felicity Oquendo is a 74 y.o. female presented today for pulmonary rehab. She state that there have been no changes in medication or health since the last visit.          She has a target heart rate of . She tolerated the exercise session well and has a pain level of 2. Patient states RPE for session today was 14 and RPD was a 4. Today the pt did:      Arm bike: 15 min  Walking: 10 min 1.3 mph for 5 min, 1.5 mph for 5 min  Recumbent bike:8  min   Breathing retraining: 10 min  Recovery and monitoring: 15 min                 Physician available for emergencies: Dr. Mickey Marroquin, ARINA 6/9/2025 11:16 AM

## 2025-06-10 ENCOUNTER — OFFICE VISIT (OUTPATIENT)
Age: 75
End: 2025-06-10
Payer: MEDICARE

## 2025-06-10 VITALS
BODY MASS INDEX: 38.51 KG/M2 | DIASTOLIC BLOOD PRESSURE: 56 MMHG | TEMPERATURE: 97.3 F | RESPIRATION RATE: 18 BRPM | OXYGEN SATURATION: 94 % | HEART RATE: 78 BPM | SYSTOLIC BLOOD PRESSURE: 117 MMHG | WEIGHT: 231.4 LBS

## 2025-06-10 DIAGNOSIS — Z90.2 S/P LOBECTOMY OF LUNG: ICD-10-CM

## 2025-06-10 DIAGNOSIS — C34.91 NSCLC OF RIGHT LUNG (HCC): Primary | ICD-10-CM

## 2025-06-10 DIAGNOSIS — Z87.891 FORMER SMOKER: ICD-10-CM

## 2025-06-10 DIAGNOSIS — J98.4 RESTRICTIVE LUNG DISEASE: ICD-10-CM

## 2025-06-10 DIAGNOSIS — J44.9 COPD, SEVERE (HCC): ICD-10-CM

## 2025-06-10 PROCEDURE — 99214 OFFICE O/P EST MOD 30 MIN: CPT | Performed by: INTERNAL MEDICINE

## 2025-06-10 PROCEDURE — 1126F AMNT PAIN NOTED NONE PRSNT: CPT | Performed by: INTERNAL MEDICINE

## 2025-06-10 PROCEDURE — 1123F ACP DISCUSS/DSCN MKR DOCD: CPT | Performed by: INTERNAL MEDICINE

## 2025-06-10 PROCEDURE — 1160F RVW MEDS BY RX/DR IN RCRD: CPT | Performed by: INTERNAL MEDICINE

## 2025-06-10 PROCEDURE — 1159F MED LIST DOCD IN RCRD: CPT | Performed by: INTERNAL MEDICINE

## 2025-06-10 RX ORDER — ALBUTEROL SULFATE 90 UG/1
2 INHALANT RESPIRATORY (INHALATION) EVERY 6 HOURS PRN
Qty: 18 G | Refills: 5 | Status: SHIPPED | OUTPATIENT
Start: 2025-06-10

## 2025-06-10 RX ORDER — MECLIZINE HYDROCHLORIDE 25 MG/1
25 TABLET ORAL 3 TIMES DAILY PRN
COMMUNITY
Start: 2025-03-19

## 2025-06-10 RX ORDER — FLUTICASONE FUROATE, UMECLIDINIUM BROMIDE AND VILANTEROL TRIFENATATE 100; 62.5; 25 UG/1; UG/1; UG/1
1 POWDER RESPIRATORY (INHALATION) DAILY
Qty: 3 EACH | Refills: 3 | Status: SHIPPED | OUTPATIENT
Start: 2025-06-10

## 2025-06-10 ASSESSMENT — ENCOUNTER SYMPTOMS
VOMITING: 0
TROUBLE SWALLOWING: 0
EYE ITCHING: 0
RHINORRHEA: 0
CHEST TIGHTNESS: 0
EYE DISCHARGE: 0
BACK PAIN: 0
BLOOD IN STOOL: 0
SHORTNESS OF BREATH: 1
EYE REDNESS: 0
CHOKING: 0
COUGH: 1
WHEEZING: 1
DIARRHEA: 0
STRIDOR: 0
SINUS PRESSURE: 0
EYE PAIN: 0
NAUSEA: 0
COLOR CHANGE: 0
APNEA: 0
ABDOMINAL PAIN: 0
SORE THROAT: 0
CONSTIPATION: 0
PHOTOPHOBIA: 0
VOICE CHANGE: 0

## 2025-06-10 NOTE — PROGRESS NOTES
Felicity SALVATORE Oquendo presents today for   Chief Complaint   Patient presents with    Follow-up Chronic Condition     NSCLC of right lung  S/P lobectomy of lung  Restrictive lung disease  Former smoker       Is someone accompanying this pt? Spouse    Is the patient using any DME equipment during OV? No    -DME Company NA    Depression Screening:        3/31/2025    12:42 PM   PHQ-9 Questionaire   Little interest or pleasure in doing things 1   Feeling down, depressed, or hopeless 0   Trouble falling or staying asleep, or sleeping too much 1   Feeling tired or having little energy 1   Poor appetite or overeating 1   Feeling bad about yourself - or that you are a failure or have let yourself or your family down 0   Trouble concentrating on things, such as reading the newspaper or watching television 0   Moving or speaking so slowly that other people could have noticed. Or the opposite - being so fidgety or restless that you have been moving around a lot more than usual 0   Thoughts that you would be better off dead, or of hurting yourself in some way 0   PHQ-9 Total Score 4   If you checked off any problems, how difficult have these problems made it for you to do your work, take care of things at home, or get along with other people? 0       Learning Needs Questionnaire:     No question data found.      Fall Risk:          No data to display                 Abuse Screening:          No data to display                  Coordination of Care:    1. Have you been to the ER, urgent care clinic since your last visit? Hospitalized since your last visit? NO    2. Have you seen or consulted any other health care providers outside of the [facility] since your last visit? Include any pap smears or colon screening. PCP    Medication list has been update per patient.        
sections show nine lymph nodes with no evidence of  involvement by carcinoma.    D: Histologic sections show two lymph nodes with no evidence of  involvement by carcinoma.    E: Histologic sections show two lymph nodes with no evidence of  involvement by carcinoma.    F: Histologic sections show two lymph nodes with no evidence of  involvement by carcinoma.       INDIANA LOCKE M.D./blair Locke M.D.  **Electronically Signed**  9/3/2024         Specimen Collected: 08/29/24 10:37 EDT Last Resulted: 09/03/24 16:21 EDT                                   An electronic signature was used to authenticate this note.    --Maria L Santos MD

## 2025-06-11 ENCOUNTER — HOSPITAL ENCOUNTER (OUTPATIENT)
Facility: HOSPITAL | Age: 75
Setting detail: RECURRING SERIES
Discharge: HOME | End: 2025-06-14
Payer: MEDICARE

## 2025-06-11 PROCEDURE — G0237 THERAPEUTIC PROCD STRG ENDUR: HCPCS

## 2025-06-11 PROCEDURE — G0239 OTH RESP PROC, GROUP: HCPCS

## 2025-06-11 NOTE — PROGRESS NOTES
Pulmonary/RT Note    Visit #   28/36            Felicity Oquendo is a 74 y.o. female presented today for pulmonary rehab. She state that there have been no changes in medication or health since the last visit.          She has a target heart rate of . She tolerated the exercise session well and has a pain level of 2. Patient states RPE for session today was 13 and RPD was a 3. Today the pt did:      Arm bike: 15 min  Walking: 10 min 1.3 mph for 5 min, 1.5 mph for 5 min  Recumbent bike:8  min   Breathing retraining: 10 min  Recovery and monitoring: 15 min                 Physician available for emergencies: Dr. Mickey Marroquin, ARINA 6/11/2025 11:37 AM

## 2025-06-13 ENCOUNTER — HOSPITAL ENCOUNTER (OUTPATIENT)
Facility: HOSPITAL | Age: 75
Setting detail: RECURRING SERIES
Discharge: HOME | End: 2025-06-16
Payer: MEDICARE

## 2025-06-13 PROCEDURE — G0237 THERAPEUTIC PROCD STRG ENDUR: HCPCS

## 2025-06-13 PROCEDURE — G0239 OTH RESP PROC, GROUP: HCPCS

## 2025-06-13 NOTE — PROGRESS NOTES
Pulmonary/RT Note    Visit #   29/36            Felicity Oquendo is a 74 y.o. female presented today for pulmonary rehab. She state that there have been no changes in medication or health since the last visit.          She has a target heart rate of . She tolerated the exercise session well and has a pain level of 2. Patient states RPE for session today was 13 and RPD was a 3. Today the pt did:      Arm bike: 10  Weights: 5 min 3 lb  Walking: 10 min 1.3 mph for 5 min, 1.5 mph for 5 min  Bodyweight: 5 min  Breathing retraining: 10 min  Recovery and monitoring: 15 min                 Physician available for emergencies: Dr. Mickey Marroquin, ARINA 6/13/2025 12:08 PM

## 2025-06-16 ENCOUNTER — HOSPITAL ENCOUNTER (OUTPATIENT)
Facility: HOSPITAL | Age: 75
Setting detail: RECURRING SERIES
Discharge: HOME | End: 2025-06-19
Payer: MEDICARE

## 2025-06-16 PROCEDURE — G0237 THERAPEUTIC PROCD STRG ENDUR: HCPCS

## 2025-06-16 NOTE — PROGRESS NOTES
Pulmonary/RT Note    Visit #      30/ 34       Felicity Oquendo is a 74 y.o. female presented today for pulmonary rehab. She state that there have been no changes in medication or health since the last visit. She was more short of breath, we reviewed the action plan which included identifying flare up zones, knowing when to contact the doctor, and taking the inhaler routinely: she was advised use her rescue inhaler more frequent during this time.    She will not be attending rehab on 6/18/2025.         She has a target heart rate of . She tolerated the exercise session well and has a pain level of 1 in her back. Patient states RPE for session today was 14 and RPD was a 3. Today the pt did:      Arm bike: 10 min  Walking: 10 min  Recumbent bike: 10 min  Breathing retraining: 10 min  Recovery and monitoring: 15 min                 Physician available for emergencies: Dr. Mickey Marroquin, ARINA 6/16/2025 1:29 PM

## 2025-06-18 ENCOUNTER — APPOINTMENT (OUTPATIENT)
Facility: HOSPITAL | Age: 75
End: 2025-06-18
Payer: MEDICARE

## 2025-06-20 ENCOUNTER — HOSPITAL ENCOUNTER (OUTPATIENT)
Facility: HOSPITAL | Age: 75
Setting detail: RECURRING SERIES
Discharge: HOME | End: 2025-06-23
Payer: MEDICARE

## 2025-06-20 PROCEDURE — G0237 THERAPEUTIC PROCD STRG ENDUR: HCPCS

## 2025-06-20 NOTE — PROGRESS NOTES
Pulmonary/RT Note    Visit #       31/36        Felicity Oquendo is a 74 y.o. female presented today for pulmonary rehab. She state that there have been no changes in medication or health since the last visit.          She has a target heart rate of . She tolerated the exercise session well and has a pain level of 1 (back). Patient states RPE for session today was 14 and RPD was a 3. Today the pt did:      Nustep:0 min  Arm bike: 10 min  Walking: 10 min  Resistance bands/Weights:  5 min   Breathing retraining: 10 min  Bodyweight: 5 min min  Bike:10 min  Recovery: 15 min             Physician available for emergencies: Dr. Alma Rosa Colvin, RT 6/20/2025 11:32 AM

## 2025-06-23 ENCOUNTER — HOSPITAL ENCOUNTER (OUTPATIENT)
Facility: HOSPITAL | Age: 75
Setting detail: RECURRING SERIES
Discharge: HOME | End: 2025-06-26
Payer: MEDICARE

## 2025-06-23 PROCEDURE — G0237 THERAPEUTIC PROCD STRG ENDUR: HCPCS

## 2025-06-23 NOTE — PROGRESS NOTES
Pulmonary/RT Note    Visit #        32/36       Felicity Oquendo is a 74 y.o. female presented today for pulmonary rehab. She had a sleep study done last week, and has a follow-up this evening.          She has a target heart rate of . She tolerated the exercise session well and has a pain level of 0 pre and post exercise. Patient states RPE for session today was 14 and RPD was a 4. Today the pt did:      Arm bike: 10 min  Walkin min  Recumbent bike: 10 min  Breathing retraining: 10 min  Recovery and monitorin min                 Physician available for emergencies: Dr Prachi Feliz RCP 2025 11:36 AM

## 2025-06-25 ENCOUNTER — HOSPITAL ENCOUNTER (OUTPATIENT)
Facility: HOSPITAL | Age: 75
Setting detail: RECURRING SERIES
Discharge: HOME | End: 2025-06-28
Payer: MEDICARE

## 2025-06-25 PROCEDURE — G0239 OTH RESP PROC, GROUP: HCPCS

## 2025-06-25 PROCEDURE — G0237 THERAPEUTIC PROCD STRG ENDUR: HCPCS

## 2025-06-25 NOTE — PROGRESS NOTES
Pulmonary/RT Note    Visit #     33/34          Felicity Oquendo is a 75 y.o. female presented today for pulmonary rehab. She state that there have been no changes in medication or health since the last visit. She felt more short of breath this morning and she used both her maintenance and her rescue inhaler with relief. We her action plan, benefits and use of.     Her last day will be on 25, she has benefited from rehab and has had improved endurance and recovery time and she feels she has gained confidence in her ability to exert herself.         She has a target heart rate of . She tolerated the exercise session well and has a pain level of 0. Patient states RPE for session today was 14 and RPD was a 3. Today the pt did:      Arm bike: 10 min  Weights: 5 min  Walking:  10 min  Recumbent bike: 10 min  Recovery and monitorin min                 Physician available for emergencies: Dr. Prachi Marroquin, ARINA 2025 11:54 AM

## 2025-06-27 ENCOUNTER — HOSPITAL ENCOUNTER (OUTPATIENT)
Facility: HOSPITAL | Age: 75
Setting detail: RECURRING SERIES
Discharge: HOME | End: 2025-06-30
Payer: MEDICARE

## 2025-06-27 PROCEDURE — 94618 PULMONARY STRESS TESTING: CPT

## 2025-06-27 ASSESSMENT — EXERCISE STRESS TEST
PEAK_BP: 142/82
PEAK_METS: 1
PEAK_HR: 80
PEAK_RPE: 11

## 2025-06-27 ASSESSMENT — PATIENT HEALTH QUESTIONNAIRE - PHQ9
SUM OF ALL RESPONSES TO PHQ QUESTIONS 1-9: 5
1. LITTLE INTEREST OR PLEASURE IN DOING THINGS: NOT AT ALL
9. THOUGHTS THAT YOU WOULD BE BETTER OFF DEAD, OR OF HURTING YOURSELF: NOT AT ALL
3. TROUBLE FALLING OR STAYING ASLEEP: SEVERAL DAYS
4. FEELING TIRED OR HAVING LITTLE ENERGY: MORE THAN HALF THE DAYS
SUM OF ALL RESPONSES TO PHQ QUESTIONS 1-9: 5
5. POOR APPETITE OR OVEREATING: MORE THAN HALF THE DAYS
SUM OF ALL RESPONSES TO PHQ QUESTIONS 1-9: 5
7. TROUBLE CONCENTRATING ON THINGS, SUCH AS READING THE NEWSPAPER OR WATCHING TELEVISION: NOT AT ALL
10. IF YOU CHECKED OFF ANY PROBLEMS, HOW DIFFICULT HAVE THESE PROBLEMS MADE IT FOR YOU TO DO YOUR WORK, TAKE CARE OF THINGS AT HOME, OR GET ALONG WITH OTHER PEOPLE: NOT DIFFICULT AT ALL
SUM OF ALL RESPONSES TO PHQ QUESTIONS 1-9: 5
8. MOVING OR SPEAKING SO SLOWLY THAT OTHER PEOPLE COULD HAVE NOTICED. OR THE OPPOSITE, BEING SO FIGETY OR RESTLESS THAT YOU HAVE BEEN MOVING AROUND A LOT MORE THAN USUAL: NOT AT ALL
6. FEELING BAD ABOUT YOURSELF - OR THAT YOU ARE A FAILURE OR HAVE LET YOURSELF OR YOUR FAMILY DOWN: NOT AT ALL
2. FEELING DOWN, DEPRESSED OR HOPELESS: NOT AT ALL

## 2025-06-27 ASSESSMENT — PULMONARY FUNCTION TESTS
FEV1/FVC: 68
FVC (LITERS): 1.43
FEV1 (%PREDICTED): 91

## 2025-06-27 ASSESSMENT — LIFESTYLE VARIABLES: ALCOHOL_USE: NO

## 2025-06-27 ASSESSMENT — EJECTION FRACTION: EF_VALUE: 60

## 2025-06-27 NOTE — PROGRESS NOTES
Diagnosis:     Restrictive Lung Disease, COPD     Primary Care Physician:Danilo Willams MD     Pulmonologist: Maria L Santos     Cardiologist: n/a     How many times have you been hospitalized or gone to the Emergency Department in the past 12 months? yes      How many of those times were due to your lung condition? 5     Date of recent admission/ED visit: 8/28/24-9/2/24     Number of Lung infections in past 12 months? 0     Any history of chest/lung injury or surgery?yes   Rt. lobectomy , lung biopsy     Any upcoming surgeries scheduled:yes   eye surgery, Nov. 14, 2024     Any of the following diagnoses: DM II, lung nodules     Family History of Heart or Lung disease: yes         Do you smoke or currently live with a smoker? no  Have you ever smoked or lived with a smoker? yes   Quit 20 years  How much and for how long?     What is your home respiratory medication routine:               Controller Medications: Trelegy              Rescue Medications: Albuterol  Are you on home Oxygen:  n/a              Details:     Home medical equipment: n/a     Any allergies to food or medication: Atorvastatin, Simvatastin        Social  History & Family Component  Living Situation:     LIVES: with family:  spouse       Pets in the home? no     Does patient have family/friends able to provide support  yes         What type of home does patient live in? 2 Story; bedroom downstairs     Does patient have a yard?  yes              If yes, can patient care for yard?  no              If no, does patient require assistance to care for yard?  yes,      Transportation Resources:  Can patient drive themselves?  yes,               If no, who is primary ?                      Services/[place]/Visual Impaired: yes, reading glasses OTC   If yes, describe:     Occupation: Retired     Education/Grade Level: 12th grade     FPC/disability date: 1993        Occupational exposures:None     Psychosocial Assessment Tools     In the 
_______________________________________________        Date/Time:  _______________________________        Pharmacist Signature:_________________________________________        Date/Time: _______________________________

## 2025-06-30 ENCOUNTER — HOSPITAL ENCOUNTER (OUTPATIENT)
Facility: HOSPITAL | Age: 75
Setting detail: RECURRING SERIES
End: 2025-06-30
Payer: MEDICARE

## (undated) DEVICE — SUTURE ABSORBABLE BRAIDED 2-0 CT-1 27 IN UD VICRYL J259H

## (undated) DEVICE — GLOVE SURG SZ 7 L11.33IN FNGR THK9.8MIL STRW LTX POLYMER

## (undated) DEVICE — APPLICATOR FBR TIP L6IN COT TIP WOOD SHFT SWAB 2000 PER CA

## (undated) DEVICE — FORCEPS BX L100CM DIA1.8MM WRK CHN 2MM PULM S STL RAD JAW 4

## (undated) DEVICE — BITE BLOCK ENDOSCP UNIV AD 6 TO 9.4 MM

## (undated) DEVICE — CATHETER CV KT AD 7 FRX20 CM DL PRESSURE INJ N TUNNELED

## (undated) DEVICE — SUTURE MONOCRYL SZ 4 0 L18IN ABSRB VLT PS 1 L24MM 3 8 CIR REV Y682H

## (undated) DEVICE — BLADE ES L4IN INSUL EDGE

## (undated) DEVICE — TIP APPL L29CM TISS GLUE EXT SPR FOR PLEUR AIR LEAK PROGEL

## (undated) DEVICE — MASK SURG REG ORNG LEV 3 SFTY SEAL 4 LAYR SFT INNR LINING

## (undated) DEVICE — SEALANT TISS GLUE 4ML PLEUR AIR LEAK PROGEL

## (undated) DEVICE — PACK PROCEDURE SURG MAJ W/ BASIN LF

## (undated) DEVICE — DRAPE: MAGNETIC 12X16 30/CS: Brand: MEDICAL ACTION INDUSTRIES

## (undated) DEVICE — SINGLE USE BIOPSY VALVE MAJ-210: Brand: SINGLE USE BIOPSY VALVE (STERILE)

## (undated) DEVICE — MEDI-VAC NON-CONDUCTIVE SUCTION TUBING: Brand: CARDINAL HEALTH

## (undated) DEVICE — KIT MICROINTRODUCER 4FR GWIRE L40CM DIA0.018IN ECHOGENIC NDL

## (undated) DEVICE — SYRINGE MED 10ML SLIP TIP BLNT FILL AND LUERLOCK DISP

## (undated) DEVICE — NEEDLE ASPIR INNR 21GA OUTER 19GA L3X15MM TRNSBRONCH

## (undated) DEVICE — 1LYRTR 16FR10ML100%SILI UMSNAP: Brand: MEDLINE INDUSTRIES, INC.

## (undated) DEVICE — ADHESIVE SKIN CLSR 0.7ML TOP DERMBND ADV

## (undated) DEVICE — WOUND RETRACTOR AND PROTECTOR: Brand: ALEXIS WOUND PROTECTOR-RETRACTOR

## (undated) DEVICE — ELECTRODE PT RET AD L9FT HI MOIST COND ADH HYDRGEL CORDED

## (undated) DEVICE — 3M™ MEDIPORE™ H SOFT CLOTH SURGICAL TAPE 2864, 4 INCH X 10 YARD (10CM X 9,14M), 12 ROLLS/CASE: Brand: 3M™ MEDIPORE™

## (undated) DEVICE — WOUND RETRACTOR AND PROTECTOR: Brand: ALEXIS O WOUND PROTECTOR-RETRACTOR

## (undated) DEVICE — SYRINGE MED 30ML STD CLR PLAS LUERLOCK TIP N CTRL DISP

## (undated) DEVICE — SPONGE SURG SM 3/8IN WHT PNUT DISECT RADPQ ST

## (undated) DEVICE — DRAIN SURG 24FR L5/16IN DIA8MM SIL RND HUBLESS FULL FLUT

## (undated) DEVICE — STAPLER ECHELON 3000 45MM STANDARD

## (undated) DEVICE — GOWN PLASTIC FILM THMBHKS UNIV BLUE: Brand: CARDINAL HEALTH

## (undated) DEVICE — SUTURE VICRYL SZ 0 L36IN ABSRB UD L36MM CT-1 1/2 CIR J946H

## (undated) DEVICE — YANKAUER,TAPERED BULBOUS TIP,W/O VENT: Brand: MEDLINE

## (undated) DEVICE — BASIN EMSIS 16OZ GRAPHITE PLAS KID SHP MOLD GRAD FOR ORAL

## (undated) DEVICE — BLANKET WRM W40.2XL55.9IN IORT LO BODY + MISTRAL AIR

## (undated) DEVICE — GUIDEWIRE VASC L25CM DIA0.018IN S STL STR SFT TIP

## (undated) DEVICE — METZENBAUM ADTEC MONOPOLAR TC DISSECTING SCISSORS, COMPLETE INSTRUMENT, MONOPOLAR, CURVED TO LEFT, WORKING LENGTH: 16 1/2", (420 MM), DIAM. 5 MM, SERRATED (FINE), BLUNT/BLUNT, ROTATABLE, INSULATED, DOUBLE ACTION, CONSISTING OF PM981R, PO746R, PO958R, DETACHABLE, NON-STERILE, REUSABLE: Brand: AESCULAP

## (undated) DEVICE — BLADE ES ELASTOMERIC COAT INSUL DURABLE BEND UPTO 90DEG

## (undated) DEVICE — DRAPE TWL SURG 16X26IN BLU ORB04] ALLCARE INC]

## (undated) DEVICE — CLIP LIG M BLU TI HRT SHP WIRE HORZ 24 CLIPS/PK 25PK/CA

## (undated) DEVICE — GAUZE,SPONGE,4"X4",16PLY,STRL,LF,10/TRAY: Brand: MEDLINE

## (undated) DEVICE — STERILE POLYISOPRENE POWDER-FREE SURGICAL GLOVES: Brand: PROTEXIS

## (undated) DEVICE — SUTURE VICRYL 1 L27IN ABSRB VLT TP-1 L65MM 1/2 CIR TAPERPOINT J650G

## (undated) DEVICE — GARMENT,MEDLINE,DVT,INT,CALF,MED, GEN2: Brand: MEDLINE

## (undated) DEVICE — PROBE ABLATN NERVE BLOCK 180 DEG 8 MM BALL TIP CRYOSPHERE

## (undated) DEVICE — BE 105-8 BRONCHOSCOPE SWIVEL - 15MM ID/22MM OD (PATIENT PORT) X15MM OD (EQUIPMENT PORT). REUSABLE.  FITS COMPONENTS OF ADULT VENTILATOR CIRCUITS.  MOLDED OF POLYETHERIMIDE. INCLUDES TWO SILICONE RUBBER CAPS; ONE CAP ALLOWS FOR THE USE OF A SUCTIONING CATHETER WHILE THE OTHER CAP ALLOWS FOR THE USE OF A FIBER-OPTIC BRONCHOSCOPE WITHOUT SIGNIFICANT LOSS OF PEEP.: Brand: BE 105-8 BRONCHOSCOPE SWIVEL

## (undated) DEVICE — 1860 HEALTH CARE N95 MASK, 20EACH/BOX  6 BX/C: Brand: 3M™

## (undated) DEVICE — DRAPE,UTILITY,TAPE,15X26,STERILE: Brand: MEDLINE

## (undated) DEVICE — 3M(TM) MEDIPORE(TM) +PAD SOFT CLOTH ADHESIVE WOUND DRESSING 3569: Brand: 3M™ MEDIPORE™

## (undated) DEVICE — CATHETER ART 20 GAX4 CM 22 GA RADIAL FEP

## (undated) DEVICE — SINGLE USE SUCTION VALVE MAJ-209: Brand: SINGLE USE SUCTION VALVE (STERILE)

## (undated) DEVICE — STAPLER INT L30MM DIA48MM TI STPL RELD DISPOSABLE DST SER TA

## (undated) DEVICE — DRAIN SURG SGL COLL PT TB FOR ATS BG OASIS

## (undated) DEVICE — COVER US PRB W15XL120CM W/ GEL RUBBERBAND TAPE STRP FLD GEN

## (undated) DEVICE — CATHETER SUCT TR FL TIP 14FR W/ O CTRL

## (undated) DEVICE — CONTAINER FORMALIN PREFILLED 10% NBF 40ML

## (undated) DEVICE — TUBING, SUCTION, 9/32" X 20', STRAIGHT: Brand: MEDLINE INDUSTRIES, INC.

## (undated) DEVICE — YANKAUER,SMOOTH HANDLE,HIGH CAPACITY: Brand: MEDLINE INDUSTRIES, INC.

## (undated) DEVICE — GOWN,SIRUS,NON REINFRCD,LARGE,SET IN SL: Brand: MEDLINE

## (undated) DEVICE — AGENT HEMOSTATIC SURG ORIGINAL ABS 4X8IN LOOSE KNIT 12/CA

## (undated) DEVICE — OPTIFOAM GENTLE LIQUITRAP, SACRUM, 9X9: Brand: MEDLINE

## (undated) DEVICE — 3M™ IOBAN™ 2 ANTIMICROBIAL INCISE DRAPE 6651EZ: Brand: IOBAN™ 2

## (undated) DEVICE — 1860S HEALTH CARE RESPIRATOR N95 120EA/C: Brand: 3M™

## (undated) DEVICE — SPONGE DRN W4XL4IN RAYON/POLYESTER 6 PLY NONWOVEN PRECUT 2 PER PK

## (undated) DEVICE — 3 ML SYRINGE WITH HYPODERMIC SAFETY NEEDLE: Brand: MAGELLAN

## (undated) DEVICE — NEEDLE ASPIR 21GA L700MM US GUID TREAT DST END FOR EFFICIENT

## (undated) DEVICE — SUTURE VICRYL + SZ 2 L27IN ABSRB UD TP-1 L65MM 1/2 CIR TAPR VCP849G

## (undated) DEVICE — 3M™ STERI-DRAPE™ INSTRUMENT POUCH 1018: Brand: STERI-DRAPE™

## (undated) DEVICE — BRUSH CYTO L140CM DIA1.5MM WRK CHN 2MM BRIST SHTH RNG HNDL

## (undated) DEVICE — SYRINGE MED 10ML TRNSLUC BRL PLUNG BLK MRK POLYPR CTRL

## (undated) DEVICE — DRAPE,LAP,CHOLE,W/TROUGHS,STERILE: Brand: MEDLINE

## (undated) DEVICE — CANNULA NSL AD TBNG L14FT STD PVC O2 CRV CONN NONFLARED NSL

## (undated) DEVICE — LINER SUCT CANSTR 3000CC PLAS SFT PRE ASSEMB W/OUT TBNG W/

## (undated) DEVICE — RELOAD STPL L45MM H2-4.1MM THCK TISS GRN GRIPPING SURF B

## (undated) DEVICE — PAD,NON-ADHERENT,3X8,STERILE,LF,1/PK: Brand: MEDLINE

## (undated) DEVICE — BLADE ES L6IN ELASTOMERIC COAT EXT DURABLE BEND UPTO 90DEG